# Patient Record
Sex: FEMALE | Race: WHITE | NOT HISPANIC OR LATINO | Employment: FULL TIME | URBAN - METROPOLITAN AREA
[De-identification: names, ages, dates, MRNs, and addresses within clinical notes are randomized per-mention and may not be internally consistent; named-entity substitution may affect disease eponyms.]

---

## 2017-01-06 ENCOUNTER — PATIENT MESSAGE (OUTPATIENT)
Dept: OBSTETRICS AND GYNECOLOGY | Facility: CLINIC | Age: 27
End: 2017-01-06

## 2017-06-19 ENCOUNTER — OFFICE VISIT (OUTPATIENT)
Dept: OBSTETRICS AND GYNECOLOGY | Facility: CLINIC | Age: 27
End: 2017-06-19
Attending: OBSTETRICS & GYNECOLOGY
Payer: COMMERCIAL

## 2017-06-19 VITALS
WEIGHT: 121.94 LBS | SYSTOLIC BLOOD PRESSURE: 100 MMHG | BODY MASS INDEX: 21.61 KG/M2 | DIASTOLIC BLOOD PRESSURE: 72 MMHG | HEIGHT: 63 IN

## 2017-06-19 DIAGNOSIS — Z01.419 WELL FEMALE EXAM WITH ROUTINE GYNECOLOGICAL EXAM: Primary | ICD-10-CM

## 2017-06-19 PROCEDURE — 88175 CYTOPATH C/V AUTO FLUID REDO: CPT

## 2017-06-19 PROCEDURE — 99395 PREV VISIT EST AGE 18-39: CPT | Mod: S$GLB,,, | Performed by: OBSTETRICS & GYNECOLOGY

## 2017-06-19 PROCEDURE — 99999 PR PBB SHADOW E&M-EST. PATIENT-LVL III: CPT | Mod: PBBFAC,,, | Performed by: OBSTETRICS & GYNECOLOGY

## 2017-06-19 NOTE — PROGRESS NOTES
SUBJECTIVE:   26 y.o. female   for routine gyn exam. Patient's last menstrual period was 05/15/2017 (exact date)..  She has no unusual complaints.  Desires refill of OCP's.         Past Medical History:   Diagnosis Date    Anxiety disorder      Past Surgical History:   Procedure Laterality Date    NO PAST SURGERIES       Social History     Social History    Marital status: Single     Spouse name: N/A    Number of children: N/A    Years of education: N/A     Occupational History    Not on file.     Social History Main Topics    Smoking status: Never Smoker    Smokeless tobacco: Never Used    Alcohol use Yes      Comment: socially    Drug use: No    Sexual activity: Yes     Partners: Male     Birth control/ protection: OCP, Condom     Other Topics Concern    Not on file     Social History Narrative    No narrative on file     Family History   Problem Relation Age of Onset    Breast cancer Neg Hx     Cancer Neg Hx     Colon cancer Neg Hx     Diabetes Neg Hx     Eclampsia Neg Hx     Ovarian cancer Neg Hx      labor Neg Hx     Hypertension Neg Hx     Miscarriages / Stillbirths Neg Hx     Stroke Neg Hx      OB History    Para Term  AB Living   0        SAB TAB Ectopic Multiple Live Births                      Current Outpatient Prescriptions   Medication Sig Dispense Refill    cetirizine (ZYRTEC) 10 MG tablet Take 10 mg by mouth once daily.      desogestrel-ethinyl estradiol (CAZIANT, 28,) 0.1/.125/.15-25 mg-mcg tablet Take 1 tablet by mouth once daily. 84 tablet 3    diphenhydrAMINE (BENADRYL) 25 mg capsule Take 25 mg by mouth every 6 (six) hours as needed for Itching.      loratadine (CLARITIN) 10 mg tablet Take 10 mg by mouth once daily.       No current facility-administered medications for this visit.      Allergies: Review of patient's allergies indicates no known allergies.     ROS:  Constitutional: no weight loss, weight gain, fever, fatigue  Eyes:  No vision  "changes, glasses/contacts  ENT/Mouth: No ulcers, sinus problems, ears ringing, headache  Cardiovascular: No inability to lie flat, chest pain, exercise intolerance, swelling, heart palpitations  Respiratory: No wheezing, coughing blood, shortness of breath, or cough  Gastrointestinal: No diarrhea, bloody stool, nausea/vomiting, constipation, gas, hemorrhoids  Genitourinary: No blood in urine, painful urination, urgency of urination, frequency of urination, incomplete emptying, incontinence, abnormal bleeding, painful periods, heavy periods, vaginal discharge, vaginal odor, painful intercourse, sexual problems, bleeding after intercourse.  Musculoskeletal: No muscle weakness  Skin/Breast: No painful breasts, nipple discharge, masses, rash, ulcers  Neurological: No passing out, seizures, numbness, headache  Endocrine: No diabetes, hypothyroid, hyperthyroid, hot flashes, hair loss, abnormal hair growth, ance  Psychiatric: No depression, crying  Hematologic: No bruises, bleeding, swollen lymph nodes, anemia.      OBJECTIVE:   The patient appears well, alert, oriented x 3, in no distress.  /72   Ht 5' 3" (1.6 m)   Wt 55.3 kg (121 lb 14.6 oz)   LMP 05/15/2017 (Exact Date)   BMI 21.60 kg/m²   NECK: no thyromegaly, trachea midline  SKIN: no acne, striae, hirsutism  CHEST: CTAB  CV: RRR  BREAST EXAM: breasts appear normal, no suspicious masses, no skin or nipple changes or axillary nodes  ABDOMEN: no hernias, masses, or hepatosplenomegaly  GENITALIA: normal external genitalia, no erythema, no discharge  URETHRA: normal urethra, normal urethral meatus  VAGINA: Normal  CERVIX: no lesions or cervical motion tenderness  UTERUS: normal size, contour, position, consistency, mobility, non-tender  ADNEXA: no mass, fullness, tenderness      ASSESSMENT:   1. Well female exam with routine gynecological exam  Liquid-based pap smear, screening       PLAN:   Orders Placed This Encounter    Liquid-based pap smear, screening    " desogestrel-ethinyl estradiol (CAZIANT, 28,) 0.1/.125/.15-25 mg-mcg tablet     Return to clinic in 1 year

## 2017-07-08 DIAGNOSIS — Z30.9 ENCOUNTER FOR CONTRACEPTIVE MANAGEMENT: ICD-10-CM

## 2017-07-09 RX ORDER — DESOGESTREL AND ETHINYL ESTRADIOL 7 DAYS X 3
KIT ORAL
Qty: 28 TABLET | Refills: 0 | Status: SHIPPED | OUTPATIENT
Start: 2017-07-09 | End: 2018-03-26

## 2017-07-19 ENCOUNTER — OFFICE VISIT (OUTPATIENT)
Dept: INTERNAL MEDICINE | Facility: CLINIC | Age: 27
End: 2017-07-19
Payer: COMMERCIAL

## 2017-07-19 ENCOUNTER — HOSPITAL ENCOUNTER (OUTPATIENT)
Dept: RADIOLOGY | Facility: OTHER | Age: 27
Discharge: HOME | End: 2017-07-19
Attending: INTERNAL MEDICINE
Payer: COMMERCIAL

## 2017-07-19 VITALS
WEIGHT: 122.56 LBS | OXYGEN SATURATION: 99 % | SYSTOLIC BLOOD PRESSURE: 110 MMHG | HEART RATE: 95 BPM | DIASTOLIC BLOOD PRESSURE: 64 MMHG | HEIGHT: 62 IN | BODY MASS INDEX: 22.55 KG/M2

## 2017-07-19 DIAGNOSIS — G44.311 INTRACTABLE ACUTE POST-TRAUMATIC HEADACHE: Primary | ICD-10-CM

## 2017-07-19 DIAGNOSIS — G44.311 INTRACTABLE ACUTE POST-TRAUMATIC HEADACHE: ICD-10-CM

## 2017-07-19 PROCEDURE — 70450 CT HEAD/BRAIN W/O DYE: CPT | Mod: 26,,, | Performed by: RADIOLOGY

## 2017-07-19 PROCEDURE — 99999 PR PBB SHADOW E&M-EST. PATIENT-LVL III: CPT | Mod: PBBFAC,,, | Performed by: INTERNAL MEDICINE

## 2017-07-19 PROCEDURE — 96372 THER/PROPH/DIAG INJ SC/IM: CPT | Mod: S$GLB,,, | Performed by: INTERNAL MEDICINE

## 2017-07-19 PROCEDURE — 70450 CT HEAD/BRAIN W/O DYE: CPT | Mod: TC

## 2017-07-19 PROCEDURE — 99213 OFFICE O/P EST LOW 20 MIN: CPT | Mod: 25,S$GLB,, | Performed by: INTERNAL MEDICINE

## 2017-07-19 RX ORDER — TRIAMCINOLONE ACETONIDE 40 MG/ML
40 INJECTION, SUSPENSION INTRA-ARTICULAR; INTRAMUSCULAR
Status: COMPLETED | OUTPATIENT
Start: 2017-07-19 | End: 2017-07-19

## 2017-07-19 RX ADMIN — TRIAMCINOLONE ACETONIDE 40 MG: 40 INJECTION, SUSPENSION INTRA-ARTICULAR; INTRAMUSCULAR at 09:07

## 2017-07-19 NOTE — PROGRESS NOTES
Subjective:       Patient ID: Daphne Sullivan is a 26 y.o. female.    Chief Complaint: Headache and Loss of Consciousness    Pt c/o 2 wks of R frontal HA with some extension to occipital area. Tylenol does help temporarily as does caffeine but has lingering HA most of day. Does not wake her up at night and usually starts about 30 min after awakening. She reports tension in neck/trap muscles but feels current HA is different than past. She sees chiropractor for related neck tension twice per week but does not feel any recent manipulation resulted in current HA. No associated n/v/vision changes. Mild sensitivity to light at times. No neuro changes except that she reports fainting at dermatologist's office 3 wks ago when getting botox. This was prior to onset of HA. HA started 6 days after visiting derm. No fever. No meningismus.        Review of Systems   Constitutional: Negative for fever.   Eyes: Negative for visual disturbance.   Respiratory: Negative for shortness of breath.    Cardiovascular: Negative for chest pain.   Gastrointestinal: Negative for nausea and vomiting.   Neurological: Positive for headaches. Negative for weakness and numbness.       Objective:      Physical Exam   Constitutional: She is oriented to person, place, and time. She appears well-developed and well-nourished.   HENT:   Head: Normocephalic and atraumatic.   Right Ear: Tympanic membrane, external ear and ear canal normal.   Left Ear: Tympanic membrane, external ear and ear canal normal.   Nose: No mucosal edema or rhinorrhea.   Mouth/Throat: No oropharyngeal exudate or posterior oropharyngeal erythema.   Eyes: EOM are normal. Pupils are equal, round, and reactive to light.   No photosensitivity on exam   Neck: Neck supple. No thyromegaly present.   Cardiovascular: Normal rate, regular rhythm and normal heart sounds.    Pulmonary/Chest: Effort normal and breath sounds normal.   Musculoskeletal: She exhibits no edema.    Lymphadenopathy:     She has no cervical adenopathy.   Neurological: She is alert and oriented to person, place, and time. She has normal strength. No cranial nerve deficit or sensory deficit. She displays a negative Romberg sign. Coordination and gait normal.   Psychiatric: She has a normal mood and affect. Her behavior is normal.       Assessment:       1. Intractable acute post-traumatic headache        Plan:       1. I suspect this is related to botox but due to syncopal event and unsure if hitting head, will obtain CT head  2. otc analgesics--try to limit if possible  3. Kenalog 40mg IMx1  4. RTC and ED prompts d/w pt and she understood

## 2017-08-04 DIAGNOSIS — Z30.9 ENCOUNTER FOR CONTRACEPTIVE MANAGEMENT: ICD-10-CM

## 2017-08-04 RX ORDER — DESOGESTREL AND ETHINYL ESTRADIOL 7 DAYS X 3
KIT ORAL
Qty: 28 TABLET | Refills: 0 | Status: SHIPPED | OUTPATIENT
Start: 2017-08-04 | End: 2018-03-26

## 2017-08-08 ENCOUNTER — PATIENT MESSAGE (OUTPATIENT)
Dept: INTERNAL MEDICINE | Facility: CLINIC | Age: 27
End: 2017-08-08

## 2017-11-24 ENCOUNTER — TELEPHONE (OUTPATIENT)
Dept: OBSTETRICS AND GYNECOLOGY | Facility: CLINIC | Age: 27
End: 2017-11-24

## 2017-11-24 NOTE — TELEPHONE ENCOUNTER
May try another attempt of Anali IUD.  She has a narrow cervix per note for last IUD attempt.  Consider scheduling insertion at time of period.  Premedicate with NSAIDS.  May also consider cytotec prior to insertion.  Will probably need cervical dilation.

## 2017-11-24 NOTE — TELEPHONE ENCOUNTER
----- Message from Nora Levine sent at 11/24/2017  2:30 PM CST -----  Contact: self  Pt is calling in regards getting the paperwork started to get an IUD.    Pt can be reached at [phone].    Thank you

## 2017-11-27 ENCOUNTER — PATIENT MESSAGE (OUTPATIENT)
Dept: OBSTETRICS AND GYNECOLOGY | Facility: CLINIC | Age: 27
End: 2017-11-27

## 2017-11-28 ENCOUNTER — TELEPHONE (OUTPATIENT)
Dept: OBSTETRICS AND GYNECOLOGY | Facility: CLINIC | Age: 27
End: 2017-11-28

## 2017-11-28 NOTE — TELEPHONE ENCOUNTER
Daphne is scheduled for constantino placement on 12/13/2017- please send cytotec prescription to pharmacy.

## 2017-11-29 RX ORDER — MISOPROSTOL 200 UG/1
200 TABLET ORAL EVERY 6 HOURS
Qty: 4 TABLET | Refills: 0 | Status: SHIPPED | OUTPATIENT
Start: 2017-11-29 | End: 2017-12-12

## 2017-12-05 ENCOUNTER — TELEPHONE (OUTPATIENT)
Dept: OBSTETRICS AND GYNECOLOGY | Facility: CLINIC | Age: 27
End: 2017-12-05

## 2017-12-05 NOTE — TELEPHONE ENCOUNTER
Called Ms Sullivan to discuss benefits for Anali insertion- Patient decline Anali and prefer scheduling for Nexplanon placement. Nexplanon placement is covered at 100% for Buy and Bill with $0.00 co payment. Scheduled for 12/12/2017.

## 2017-12-11 ENCOUNTER — OFFICE VISIT (OUTPATIENT)
Dept: URGENT CARE | Facility: CLINIC | Age: 27
End: 2017-12-11
Payer: COMMERCIAL

## 2017-12-11 VITALS
BODY MASS INDEX: 22.08 KG/M2 | WEIGHT: 120 LBS | TEMPERATURE: 98 F | OXYGEN SATURATION: 100 % | SYSTOLIC BLOOD PRESSURE: 108 MMHG | DIASTOLIC BLOOD PRESSURE: 61 MMHG | HEIGHT: 62 IN | RESPIRATION RATE: 18 BRPM | HEART RATE: 87 BPM

## 2017-12-11 DIAGNOSIS — J02.9 PHARYNGITIS, UNSPECIFIED ETIOLOGY: Primary | ICD-10-CM

## 2017-12-11 LAB
CTP QC/QA: YES
S PYO RRNA THROAT QL PROBE: NEGATIVE

## 2017-12-11 PROCEDURE — 99203 OFFICE O/P NEW LOW 30 MIN: CPT | Mod: 25,S$GLB,, | Performed by: NURSE PRACTITIONER

## 2017-12-11 PROCEDURE — 96372 THER/PROPH/DIAG INJ SC/IM: CPT | Mod: S$GLB,,, | Performed by: EMERGENCY MEDICINE

## 2017-12-11 PROCEDURE — 87880 STREP A ASSAY W/OPTIC: CPT | Mod: QW,S$GLB,, | Performed by: NURSE PRACTITIONER

## 2017-12-11 RX ORDER — BETAMETHASONE SODIUM PHOSPHATE AND BETAMETHASONE ACETATE 3; 3 MG/ML; MG/ML
6 INJECTION, SUSPENSION INTRA-ARTICULAR; INTRALESIONAL; INTRAMUSCULAR; SOFT TISSUE
Status: COMPLETED | OUTPATIENT
Start: 2017-12-11 | End: 2017-12-11

## 2017-12-11 RX ADMIN — BETAMETHASONE SODIUM PHOSPHATE AND BETAMETHASONE ACETATE 6 MG: 3; 3 INJECTION, SUSPENSION INTRA-ARTICULAR; INTRALESIONAL; INTRAMUSCULAR; SOFT TISSUE at 11:12

## 2017-12-11 NOTE — PROGRESS NOTES
"Subjective:       Patient ID: Daphne Sullivan is a 27 y.o. female.    Vitals:  height is 5' 2" (1.575 m) and weight is 54.4 kg (120 lb). Her oral temperature is 98.3 °F (36.8 °C). Her blood pressure is 108/61 and her pulse is 87. Her respiration is 18 and oxygen saturation is 100%.     Chief Complaint: Sore Throat    Patient states both ears feels clogged.      Sore Throat    This is a new problem. The current episode started yesterday. The problem has been gradually worsening. Neither side of throat is experiencing more pain than the other. There has been no fever. The pain is at a severity of 0/10. The patient is experiencing no pain. Associated symptoms include coughing, ear pain, a hoarse voice, a plugged ear sensation, swollen glands and trouble swallowing. Pertinent negatives include no abdominal pain, congestion, ear discharge, headaches or shortness of breath. She has had no exposure to strep or mono. Treatments tried: robtussin. The treatment provided mild relief.     Review of Systems   Constitution: Negative for chills, fever and malaise/fatigue.   HENT: Positive for ear pain, hoarse voice, sore throat and trouble swallowing. Negative for congestion and ear discharge.    Eyes: Negative for discharge and redness.   Cardiovascular: Negative for chest pain, dyspnea on exertion and leg swelling.   Respiratory: Positive for cough. Negative for shortness of breath, sputum production and wheezing.    Musculoskeletal: Negative for myalgias.   Gastrointestinal: Negative for abdominal pain and nausea.   Neurological: Negative for headaches.       Objective:      Physical Exam   Constitutional: She is oriented to person, place, and time. She appears well-developed and well-nourished. She is cooperative.  Non-toxic appearance. She does not appear ill. No distress.   HENT:   Head: Normocephalic and atraumatic.   Right Ear: Tympanic membrane, external ear and ear canal normal. Decreased hearing (CERUMINOSIS) is " noted.   Left Ear: Hearing, tympanic membrane, external ear and ear canal normal.   Nose: Nose normal. No mucosal edema, rhinorrhea or nasal deformity. No epistaxis. Right sinus exhibits no maxillary sinus tenderness and no frontal sinus tenderness. Left sinus exhibits no maxillary sinus tenderness and no frontal sinus tenderness.   Mouth/Throat: Uvula is midline and mucous membranes are normal. No trismus in the jaw. Normal dentition. No uvula swelling. Posterior oropharyngeal erythema (WITH COBBLESTONING) present. Tonsils are 2+ on the right. Tonsils are 2+ on the left. No tonsillar exudate.   Eyes: Conjunctivae and lids are normal. No scleral icterus.   Sclera clear bilat   Neck: Trachea normal, full passive range of motion without pain and phonation normal. Neck supple.   Cardiovascular: Normal rate, regular rhythm, normal heart sounds, intact distal pulses and normal pulses.    Pulmonary/Chest: Effort normal and breath sounds normal. No respiratory distress.   Abdominal: Soft. Normal appearance and bowel sounds are normal. She exhibits no distension. There is no tenderness.   Musculoskeletal: Normal range of motion. She exhibits no edema or deformity.   Lymphadenopathy:        Head (right side): Tonsillar adenopathy present.        Head (left side): Tonsillar adenopathy present.   Neurological: She is alert and oriented to person, place, and time. She exhibits normal muscle tone. Coordination normal.   Skin: Skin is warm, dry and intact. She is not diaphoretic. No pallor.   Psychiatric: She has a normal mood and affect. Her speech is normal and behavior is normal. Judgment and thought content normal. Cognition and memory are normal.   Nursing note and vitals reviewed.      Assessment:       1. Pharyngitis, unspecified etiology        Plan:         Pharyngitis, unspecified etiology  -     POCT rapid strep A  -     CULTURE, STREP A,  THROAT    Other orders  -     betamethasone acetate-betamethasone sodium  phosphate injection 6 mg; Inject 1 mL (6 mg total) into the muscle one time.      Patient Instructions   WE WILL CALL YOU IN 2-3 DAYS WITH THE RESULTS OF YOUR STREP CULTURE  Pharyngitis (Sore Throat), Report Pending    Pharyngitis (sore throat) is often due to a virus. It can also be caused by the streptococcus, or strep, bacterium, often called strep throat. Both viral and strep infections can cause throat pain that is worse when swallowing, aching all over with headache, and fever. Both types of infections are contagious. They may be spread by coughing, kissing, or touching others after touching your mouth or nose.  A test has been done to find out whether you (or your child, if your child is the patient) have strep throat. Call this facility or your healthcare provider if you were not given your test results. If the test is positive for strep infection, you will need to take antibiotic medicines. A prescription can be called into your pharmacy at that time. If the test is negative, you probably have a viral pharyngitis. This does not need to be treated with antibiotics. Until you receive the results of the strep test, you should stay home from work. If your child is being tested, he or she should stay home from school.  Home care  · Rest at home. Drink plenty of fluids so you won't get dehydrated.  · If the test is positive for strep, don't go to work or school for the first 2 days of taking the antibiotics. After this time, you will not be contagious. You can then return to work or school if you are feeling better.   · Take the antibiotic medicine for the full 10 days, even if you feel better. This is very important to make sure the infection is treated. It is also important to prevent drug-resistant germs from developing. If you were given an antibiotic shot, you won't need more antibiotics.  · For children: Use acetaminophen for fever, fussiness, or discomfort. In infants older than 6 months of age, you may  use ibuprofen instead of acetaminophen. Talk with your child's healthcare provider before giving these medicines if your child has chronic liver or kidney disease or ever had a stomach ulcer or GI bleeding. Never give aspirin to a child under 18 years of age who is ill with a fever. It may cause severe liver damage.  · For adults: Use acetaminophen or ibuprofen to control pain or fever, unless another medicine was prescribed for this. Talk with your healthcare provider before taking these medicines if you have chronic liver or kidney disease or ever had a stomach ulcer or GI bleeding.  · Use throat lozenges or numbing throat sprays to help reduce pain. Gargling with warm salt water will also help reduce throat pain. For this, dissolve 1/2 teaspoon of salt in 1 glass of warm water. To help soothe a sore throat, children can sip on juice or a popsicle. Children 5 years and older can also suck on a lollipop or hard candy.  · Don't eat salty or spicy foods. These can irritate the throat.  Follow-up care  Follow up with your healthcare provider or our staff if you don't get better over the next week.  When to seek medical advice  Call your healthcare provider right away if any of these occur:  · Fever as directed by your healthcare provider. For children, seek care if:  ¨ Your child is of any age and has repeated fevers above 104°F (40°C).  ¨ Your child is younger than 2 years of age and has a fever of 100.4°F (38°C) that continues for more than 1 day.  ¨ Your child is 2 years old or older and has a fever of 100.4°F (38°C) that continues for more than 3 days.  · New or worsening ear pain, sinus pain, or headache  · Painful lumps in the back of neck  · Stiff neck  · Lymph nodes are getting larger  · Inability to swallow liquids, excessive drooling, or inability to open mouth wide due to throat pain  · Signs of dehydration (very dark urine or no urine, sunken eyes, dizziness)  · Trouble breathing or noisy  breathing  · Muffled voice  · New rash  · Child appears to be getting sicker  Date Last Reviewed: 4/13/2015  © 2010-6286 [employer], [email or web address]. [address] [place] [address], [place] [address]. All rights reserved. This information is not intended as a substitute for professional medical care. Always follow your healthcare professional's instructions.

## 2017-12-11 NOTE — PATIENT INSTRUCTIONS
WE WILL CALL YOU IN 2-3 DAYS WITH THE RESULTS OF YOUR STREP CULTURE  Pharyngitis (Sore Throat), Report Pending    Pharyngitis (sore throat) is often due to a virus. It can also be caused by the streptococcus, or strep, bacterium, often called strep throat. Both viral and strep infections can cause throat pain that is worse when swallowing, aching all over with headache, and fever. Both types of infections are contagious. They may be spread by coughing, kissing, or touching others after touching your mouth or nose.  A test has been done to find out whether you (or your child, if your child is the patient) have strep throat. Call this facility or your healthcare provider if you were not given your test results. If the test is positive for strep infection, you will need to take antibiotic medicines. A prescription can be called into your pharmacy at that time. If the test is negative, you probably have a viral pharyngitis. This does not need to be treated with antibiotics. Until you receive the results of the strep test, you should stay home from work. If your child is being tested, he or she should stay home from school.  Home care  · Rest at home. Drink plenty of fluids so you won't get dehydrated.  · If the test is positive for strep, don't go to work or school for the first 2 days of taking the antibiotics. After this time, you will not be contagious. You can then return to work or school if you are feeling better.   · Take the antibiotic medicine for the full 10 days, even if you feel better. This is very important to make sure the infection is treated. It is also important to prevent drug-resistant germs from developing. If you were given an antibiotic shot, you won't need more antibiotics.  · For children: Use acetaminophen for fever, fussiness, or discomfort. In infants older than 6 months of age, you may use ibuprofen instead of acetaminophen. Talk with your child's healthcare provider before giving these  medicines if your child has chronic liver or kidney disease or ever had a stomach ulcer or GI bleeding. Never give aspirin to a child under 18 years of age who is ill with a fever. It may cause severe liver damage.  · For adults: Use acetaminophen or ibuprofen to control pain or fever, unless another medicine was prescribed for this. Talk with your healthcare provider before taking these medicines if you have chronic liver or kidney disease or ever had a stomach ulcer or GI bleeding.  · Use throat lozenges or numbing throat sprays to help reduce pain. Gargling with warm salt water will also help reduce throat pain. For this, dissolve 1/2 teaspoon of salt in 1 glass of warm water. To help soothe a sore throat, children can sip on juice or a popsicle. Children 5 years and older can also suck on a lollipop or hard candy.  · Don't eat salty or spicy foods. These can irritate the throat.  Follow-up care  Follow up with your healthcare provider or our staff if you don't get better over the next week.  When to seek medical advice  Call your healthcare provider right away if any of these occur:  · Fever as directed by your healthcare provider. For children, seek care if:  ¨ Your child is of any age and has repeated fevers above 104°F (40°C).  ¨ Your child is younger than 2 years of age and has a fever of 100.4°F (38°C) that continues for more than 1 day.  ¨ Your child is 2 years old or older and has a fever of 100.4°F (38°C) that continues for more than 3 days.  · New or worsening ear pain, sinus pain, or headache  · Painful lumps in the back of neck  · Stiff neck  · Lymph nodes are getting larger  · Inability to swallow liquids, excessive drooling, or inability to open mouth wide due to throat pain  · Signs of dehydration (very dark urine or no urine, sunken eyes, dizziness)  · Trouble breathing or noisy breathing  · Muffled voice  · New rash  · Child appears to be getting sicker  Date Last Reviewed: 4/13/2015  © 4581-4468  [employer]. [address] [place] [address], [place] [address]. All rights reserved. This information is not intended as a substitute for professional medical care. Always follow your healthcare professional's instructions.

## 2017-12-12 ENCOUNTER — PROCEDURE VISIT (OUTPATIENT)
Dept: OBSTETRICS AND GYNECOLOGY | Facility: CLINIC | Age: 27
End: 2017-12-12
Payer: COMMERCIAL

## 2017-12-12 DIAGNOSIS — Z30.017 NEXPLANON INSERTION: Primary | ICD-10-CM

## 2017-12-12 DIAGNOSIS — Z97.5 NEXPLANON IN PLACE: ICD-10-CM

## 2017-12-12 PROCEDURE — 11981 INSERTION DRUG DLVR IMPLANT: CPT | Mod: S$GLB,,, | Performed by: NURSE PRACTITIONER

## 2017-12-12 NOTE — PROCEDURES
Procedures   CC: NEXPLANON INSERTION      PRE-NEXPLANON INSERTION COUNSELING:  All contraceptive options were reviewed and the patient chooses Nexplanon.  Patients history was reviewed and there were no contraindications to Nexplanon.  The procedure and minimal risks of pain, bleeding, bruising and infection at the insertion site discussed. Possible irregular menstrual bleeding pattern versus amenorrhea was explained.  No protection against STDs discussed.  Written information provided; all questions answered and patient agrees to proceed.  Consent signed and scanned into computer.  NEXPLANON LOT #[id number],  EXPIRATION 05/2020    There were no vitals filed for this visit.    EXAM:  With patient in supine position the nondominant arm was flexed at the elbow and externally rotated.  The insertion site was identified 6-8 cm above the elbow crease at the inner side of the upper arm overlying the groove between biceps and triceps.  The insertion site was marked and a second jorge was placed 6-8 cm above the first.    PROCEDURE:  TIME OUT PERFORMED.  The insertion site was prepped with antiseptic and injected with 2 cc of 1% Xylocaine without epinephrine subq along the planned insertion canal.  Using sterile technique the Nexplanon applicator was visually verified and removed from the blister pack.  The Transparent Protection Cap was removed by sliding it horizontally in the direction of the arrow away from the needle.  The white colored implant was visualized by looking into the tip of the needle.   The needle tip was inserted bevel side up at a 30 degree angle to penetrate the skin.  The applicator was lowered parallel to the arm and the skin was tented with the needle.  While lifting skin with the needle, the needle was inserted to its full length.  Keeping the applicator in place, the purple slider was unlocked by pushing it slightly down.  The slider was then moved fully back until it stopped.  The applicator was then  removed. The Needle was noted to be fully retracted and only the purple tip of the obturator was visible.  The implant was palpable beneath the skin after insertion.  A small adhesive bandage and then a pressure bandage was placed over the insertion site.  The patient tolerated the procedure well.    ASSESSMENT:  1. Contraception management / Nexplanon insertion.V25.0.    POST NEXPLANON INSERTION COUNSELING:  Manage post nexplanon placement arm pain with NSAIDs, Tylenol or Rx per Med[employer].  Keep arm elevated and apply intermittent ice packs to decrease pain and bruising for 24 Hours.  May remove bandage in 24 hours.  Nexplanon danger signs (worsening pain at insertion site, bleeding through bandage, redness and/or pus drainage at insertion site).  Removal in 3 years.    Counseling lasted approximately 15 minutes and all her questions were answered.    FOLLOW-UP: with me in two weeks.

## 2017-12-14 LAB — S PYO THROAT QL CULT: NEGATIVE

## 2017-12-15 ENCOUNTER — TELEPHONE (OUTPATIENT)
Dept: URGENT CARE | Facility: CLINIC | Age: 27
End: 2017-12-15

## 2017-12-15 NOTE — TELEPHONE ENCOUNTER
----- Message from Connor Mistry MD sent at 12/14/2017  7:36 PM CST -----  These results are normal. Please notify the patient.

## 2018-03-20 ENCOUNTER — PATIENT MESSAGE (OUTPATIENT)
Dept: OBSTETRICS AND GYNECOLOGY | Facility: CLINIC | Age: 28
End: 2018-03-20

## 2018-03-20 ENCOUNTER — PATIENT MESSAGE (OUTPATIENT)
Dept: INTERNAL MEDICINE | Facility: CLINIC | Age: 28
End: 2018-03-20

## 2018-03-20 DIAGNOSIS — F40.01 PANIC DISORDER WITH AGORAPHOBIA: ICD-10-CM

## 2018-03-20 RX ORDER — TRETINOIN 0.25 MG/G
CREAM TOPICAL
COMMUNITY
Start: 2017-12-13 | End: 2019-07-31

## 2018-03-22 RX ORDER — LORAZEPAM 0.5 MG/1
0.5 TABLET ORAL EVERY 6 HOURS PRN
Qty: 10 TABLET | Refills: 0 | OUTPATIENT
Start: 2018-03-22 | End: 2018-04-21

## 2018-03-26 ENCOUNTER — OFFICE VISIT (OUTPATIENT)
Dept: INTERNAL MEDICINE | Facility: CLINIC | Age: 28
End: 2018-03-26
Payer: COMMERCIAL

## 2018-03-26 VITALS
SYSTOLIC BLOOD PRESSURE: 110 MMHG | WEIGHT: 118.63 LBS | BODY MASS INDEX: 21.83 KG/M2 | HEART RATE: 62 BPM | HEIGHT: 62 IN | DIASTOLIC BLOOD PRESSURE: 64 MMHG

## 2018-03-26 DIAGNOSIS — F41.8 OTHER SPECIFIED ANXIETY DISORDERS: Primary | ICD-10-CM

## 2018-03-26 PROCEDURE — 99999 PR PBB SHADOW E&M-EST. PATIENT-LVL III: CPT | Mod: PBBFAC,,, | Performed by: INTERNAL MEDICINE

## 2018-03-26 PROCEDURE — 99213 OFFICE O/P EST LOW 20 MIN: CPT | Mod: S$GLB,,, | Performed by: INTERNAL MEDICINE

## 2018-03-26 RX ORDER — LORAZEPAM 0.5 MG/1
0.5 TABLET ORAL 2 TIMES DAILY PRN
Qty: 20 TABLET | Refills: 0 | Status: SHIPPED | OUTPATIENT
Start: 2018-03-26 | End: 2019-07-31

## 2018-03-26 NOTE — PROGRESS NOTES
Subjective:       Patient ID: Daphne Sullivan is a 27 y.o. female.    Chief Complaint: Anxiety    Pt reports increased anxiety since father  unexpectedly 2 wks ago. She has used some ativan very rarely in past for anxiety but no longer has any and would like refill. Denies SI/HI. Seems to be grieving appropriately otherwise and is engaging with a counselor.       Review of Systems   Constitutional: Negative for activity change and unexpected weight change.   HENT: Negative for hearing loss, rhinorrhea and trouble swallowing.    Eyes: Negative for discharge and visual disturbance.   Respiratory: Positive for chest tightness. Negative for shortness of breath and wheezing.    Cardiovascular: Positive for palpitations. Negative for chest pain.   Gastrointestinal: Negative for blood in stool, constipation, diarrhea and vomiting.   Endocrine: Negative for polydipsia and polyuria.   Genitourinary: Negative for difficulty urinating, dysuria, hematuria and menstrual problem.   Musculoskeletal: Negative for arthralgias, joint swelling and neck pain.   Neurological: Negative for weakness and headaches.   Psychiatric/Behavioral: Negative for confusion and dysphoric mood. The patient is nervous/anxious.        Objective:      Physical Exam   Constitutional: She is oriented to person, place, and time. She appears well-developed and well-nourished.   Neck: Neck supple. No thyromegaly present.   Cardiovascular: Normal rate, regular rhythm and normal heart sounds.    Pulmonary/Chest: Effort normal and breath sounds normal.   Musculoskeletal: She exhibits no edema.   Lymphadenopathy:     She has no cervical adenopathy.   Neurological: She is alert and oriented to person, place, and time.   Psychiatric: She has a normal mood and affect. Her behavior is normal.       Assessment:       1. Other specified anxiety disorders        Plan:       1. Refill limited supply of ativan--proper use d/w pt  2. She understands to let me or  pcp know if needing ativan more frequently and we can consider more appropriate long term maint meds

## 2018-07-11 ENCOUNTER — PATIENT MESSAGE (OUTPATIENT)
Dept: OBSTETRICS AND GYNECOLOGY | Facility: CLINIC | Age: 28
End: 2018-07-11

## 2018-07-25 ENCOUNTER — OFFICE VISIT (OUTPATIENT)
Dept: OBSTETRICS AND GYNECOLOGY | Facility: CLINIC | Age: 28
End: 2018-07-25
Attending: OBSTETRICS & GYNECOLOGY
Payer: COMMERCIAL

## 2018-07-25 ENCOUNTER — PATIENT MESSAGE (OUTPATIENT)
Dept: OBSTETRICS AND GYNECOLOGY | Facility: CLINIC | Age: 28
End: 2018-07-25

## 2018-07-25 VITALS
DIASTOLIC BLOOD PRESSURE: 60 MMHG | WEIGHT: 119.69 LBS | BODY MASS INDEX: 22.03 KG/M2 | SYSTOLIC BLOOD PRESSURE: 100 MMHG | HEIGHT: 62 IN

## 2018-07-25 DIAGNOSIS — Z01.419 WELL FEMALE EXAM WITH ROUTINE GYNECOLOGICAL EXAM: Primary | ICD-10-CM

## 2018-07-25 PROCEDURE — 99999 PR PBB SHADOW E&M-EST. PATIENT-LVL III: CPT | Mod: PBBFAC,,, | Performed by: OBSTETRICS & GYNECOLOGY

## 2018-07-25 PROCEDURE — 99395 PREV VISIT EST AGE 18-39: CPT | Mod: S$GLB,,, | Performed by: OBSTETRICS & GYNECOLOGY

## 2018-07-25 NOTE — PROGRESS NOTES
SUBJECTIVE:   27 y.o. female   for routine gyn exam. No LMP recorded. Patient has had an implant..  She has no unusual complaints.  Has Nexplanon since .  C/o hair loss.         Past Medical History:   Diagnosis Date    Anxiety disorder      Past Surgical History:   Procedure Laterality Date    NO PAST SURGERIES       Social History     Social History    Marital status: Single     Spouse name: N/A    Number of children: N/A    Years of education: N/A     Occupational History    Not on file.     Social History Main Topics    Smoking status: Never Smoker    Smokeless tobacco: Never Used    Alcohol use Yes      Comment: socially    Drug use: No    Sexual activity: Yes     Partners: Male     Birth control/ protection: Condom, Implant      Comment: Nexplanon 17 - Adriana Ewing     Other Topics Concern    Not on file     Social History Narrative    No narrative on file     Family History   Problem Relation Age of Onset    Heart disease Father         CAD--MI    Breast cancer Neg Hx     Cancer Neg Hx     Colon cancer Neg Hx     Diabetes Neg Hx     Eclampsia Neg Hx     Ovarian cancer Neg Hx      labor Neg Hx     Hypertension Neg Hx     Miscarriages / Stillbirths Neg Hx     Stroke Neg Hx      OB History    Para Term  AB Living   0             SAB TAB Ectopic Multiple Live Births                           Current Outpatient Prescriptions   Medication Sig Dispense Refill    LORazepam (ATIVAN) 0.5 MG tablet Take 1 tablet (0.5 mg total) by mouth 2 (two) times daily as needed for Anxiety. 20 tablet 0    tretinoin (RETIN-A) 0.025 % cream        No current facility-administered medications for this visit.      Allergies: Patient has no known allergies.     ROS:  Constitutional: no weight loss, weight gain, fever, fatigue  Eyes:  No vision changes, glasses/contacts  ENT/Mouth: No ulcers, sinus problems, ears ringing, headache  Cardiovascular: No inability to lie  "flat, chest pain, exercise intolerance, swelling, heart palpitations  Respiratory: No wheezing, coughing blood, shortness of breath, or cough  Gastrointestinal: No diarrhea, bloody stool, nausea/vomiting, constipation, gas, hemorrhoids  Genitourinary: No blood in urine, painful urination, urgency of urination, frequency of urination, incomplete emptying, incontinence, abnormal bleeding, painful periods, heavy periods, vaginal discharge, vaginal odor, painful intercourse, sexual problems, bleeding after intercourse.  Musculoskeletal: No muscle weakness  Skin/Breast: No painful breasts, nipple discharge, masses, rash, ulcers  Neurological: No passing out, seizures, numbness, headache  Endocrine: No diabetes, hypothyroid, hyperthyroid, hot flashes, hair loss, abnormal hair growth, ance  Psychiatric: No depression, crying  Hematologic: No bruises, bleeding, swollen lymph nodes, anemia.      OBJECTIVE:   The patient appears well, alert, oriented x 3, in no distress.  /60   Ht 5' 2" (1.575 m)   Wt 54.3 kg (119 lb 11.4 oz)   BMI 21.90 kg/m²   NECK: no thyromegaly, trachea midline  SKIN: no acne, striae, hirsutism  CHEST: CTAB  CV: RRR  BREAST EXAM: breasts appear normal, no suspicious masses, no skin or nipple changes or axillary nodes  ABDOMEN: no hernias, masses, or hepatosplenomegaly  GENITALIA: normal external genitalia, no erythema, no discharge  URETHRA: normal urethra, normal urethral meatus  VAGINA: Normal  CERVIX: no lesions or cervical motion tenderness  UTERUS: normal size, contour, position, consistency, mobility, non-tender  ADNEXA: no mass, fullness, tenderness      ASSESSMENT:   1. Well female exam with routine gynecological exam         PLAN:       Discussed Nexplanon and SE.  Consider removal and alternative contraception  Return to clinic in 1 year  "

## 2018-08-06 ENCOUNTER — PATIENT MESSAGE (OUTPATIENT)
Dept: OBSTETRICS AND GYNECOLOGY | Facility: CLINIC | Age: 28
End: 2018-08-06

## 2018-09-07 ENCOUNTER — PROCEDURE VISIT (OUTPATIENT)
Dept: OBSTETRICS AND GYNECOLOGY | Facility: CLINIC | Age: 28
End: 2018-09-07
Payer: COMMERCIAL

## 2018-09-07 VITALS
BODY MASS INDEX: 21.73 KG/M2 | SYSTOLIC BLOOD PRESSURE: 112 MMHG | WEIGHT: 118.81 LBS | DIASTOLIC BLOOD PRESSURE: 64 MMHG

## 2018-09-07 DIAGNOSIS — Z30.46 NEXPLANON REMOVAL: Primary | ICD-10-CM

## 2018-09-07 DIAGNOSIS — Z30.8 ENCOUNTER FOR OTHER CONTRACEPTIVE MANAGEMENT: ICD-10-CM

## 2018-09-07 PROCEDURE — 11982 REMOVE DRUG IMPLANT DEVICE: CPT | Mod: S$GLB,,, | Performed by: OBSTETRICS & GYNECOLOGY

## 2018-09-07 RX ORDER — NORETHINDRONE ACETATE AND ETHINYL ESTRADIOL 1MG-20(21)
1 KIT ORAL DAILY
Qty: 30 TABLET | Refills: 11 | Status: SHIPPED | OUTPATIENT
Start: 2018-09-07 | End: 2019-07-31 | Stop reason: SDUPTHER

## 2018-09-07 NOTE — PROCEDURES
Procedures   NEXPLANON REMOVAL:    Pt is a 28 y.o. with LMP No LMP recorded. Patient has had an implant. here for implanon removal because she feels that it is making her lose her hair..      PRE-IMPLANON REMOVAL COUNSELING:  The patient was advised of minimal risks of bleeding and pain and she agrees to proceed.    EXAM:  Implanon palpable by palpation or imaging.  The end closest to the elbow was marked.    PROCEDURE:  TIME OUT PERFORMED.  The patient was placed in same position as for insertion.  The area was prepped with antiseptic.  4 cc of 1% lidocaine was injected just underneath end of implant closest to the elbow.  Downward pressure was placed on the end of the implant closest to the axilla.  Using sterile technique, a 2-3 mm incision was made in the area of the previous insertion scar on her arm at tip of the implant closest to the elbow.    The entire 4 cm implant was removed.  The incision site was closed with steri strips and a small adhesive bandage and then pressure bandage was placed over insertion site.  The procedure was well tolerated     ASSESSMENT:  Contraceptive Management / Removal Implanon    POST IMPLANON REMOVAL COUNSELING:  Expect period-like flow to occur after Implanon removal and periods to return to pre-Implanon  pattern.  Manage post Implanon arm pain with Tylenol or Rx per [facility].  Keep arm elevated and apply intermittent ice packs to decrease pain and bruising for 24 hours.  May remove bandage in 24 hours.  Implanon removal danger signs (worsening pain at incision site, bleeding through  bandage, redness and/or pus drainage at incision site).    POST IMPLANON REMOVAL CONTRACEPTION: All forms of contraception discussed in length with patient.  Discussed the pros and cons of OCPs, depo provera, IUD, nexplanon, the patch and NuvaRing.  Discussed that pt might have irregular menses with the initiation of any form of contraception.  Discussed that none of these protect against STDs.   Depending on which contraception pt chooses, discussed need for backup form of birth control for at least the first month after switching.  Pt voiced understanding and all questions were answered.  After our discussion, the patient has decided to try OCPs.    -Discussed that menses will be irregular for first 3 months      Counseling lasted approximately 15 minutes and all her questions were answered.    FOLLOW-UP: with me for annual gyn exam or prn.    Zena Montenegro MD  Obstetrics & Gynecology

## 2019-02-21 ENCOUNTER — OFFICE VISIT (OUTPATIENT)
Dept: INTERNAL MEDICINE | Facility: CLINIC | Age: 29
End: 2019-02-21
Payer: COMMERCIAL

## 2019-02-21 VITALS
SYSTOLIC BLOOD PRESSURE: 110 MMHG | BODY MASS INDEX: 21.94 KG/M2 | DIASTOLIC BLOOD PRESSURE: 62 MMHG | WEIGHT: 119.25 LBS | OXYGEN SATURATION: 99 % | HEIGHT: 62 IN | HEART RATE: 59 BPM

## 2019-02-21 DIAGNOSIS — L29.9 ITCHING: ICD-10-CM

## 2019-02-21 DIAGNOSIS — R21 RASH: ICD-10-CM

## 2019-02-21 DIAGNOSIS — B02.9 HERPES ZOSTER WITHOUT COMPLICATION: Primary | ICD-10-CM

## 2019-02-21 PROCEDURE — 99999 PR PBB SHADOW E&M-EST. PATIENT-LVL III: CPT | Mod: PBBFAC,,, | Performed by: NURSE PRACTITIONER

## 2019-02-21 PROCEDURE — 3008F BODY MASS INDEX DOCD: CPT | Mod: CPTII,S$GLB,, | Performed by: NURSE PRACTITIONER

## 2019-02-21 PROCEDURE — 99213 OFFICE O/P EST LOW 20 MIN: CPT | Mod: S$GLB,,, | Performed by: NURSE PRACTITIONER

## 2019-02-21 PROCEDURE — 99999 PR PBB SHADOW E&M-EST. PATIENT-LVL III: ICD-10-PCS | Mod: PBBFAC,,, | Performed by: NURSE PRACTITIONER

## 2019-02-21 PROCEDURE — 3008F PR BODY MASS INDEX (BMI) DOCUMENTED: ICD-10-PCS | Mod: CPTII,S$GLB,, | Performed by: NURSE PRACTITIONER

## 2019-02-21 PROCEDURE — 99213 PR OFFICE/OUTPT VISIT, EST, LEVL III, 20-29 MIN: ICD-10-PCS | Mod: S$GLB,,, | Performed by: NURSE PRACTITIONER

## 2019-02-21 RX ORDER — MUPIROCIN 20 MG/G
OINTMENT TOPICAL 2 TIMES DAILY
Qty: 22 G | Refills: 0 | Status: SHIPPED | OUTPATIENT
Start: 2019-02-21 | End: 2019-02-28

## 2019-02-21 RX ORDER — VALACYCLOVIR HYDROCHLORIDE 1 G/1
1000 TABLET, FILM COATED ORAL 3 TIMES DAILY
Qty: 21 TABLET | Refills: 0 | Status: SHIPPED | OUTPATIENT
Start: 2019-02-21 | End: 2019-07-31

## 2019-02-21 NOTE — PATIENT INSTRUCTIONS
Valtrex 1g po TID x 7 days.  Bactroban ointment to rash BID x 7 days.  OTC Benadryl prn.  OTC Tylenol or NSAID's prn.  Report to ED for severe or worsening symptoms.  F/U with PCP.  F/U with Dermatology.  RTC prn.      Shingles (Herpes Zoster)     Talk to your healthcare provider about the shingles vaccine.     Shingles is also called herpes zoster. It is a painful skin rash caused by the herpes zoster virus. This is the same virus that causes chickenpox. After a person has chickenpox, the virus remains inactive in the nerve cells. Years later, the virus can become active again and travel to the skin. Most people have shingles only once, but it is possible to have it more than once.  What are the risk factors for shingles?  Anyone who has ever had chickenpox can develop shingles. But your risk is greater if you:  · Are 50 years of age or older  · Have an illness that weakens your immune system, such as HIV/AIDS  · Have cancer, especially Hodgkin disease or lymphoma  · Take medicines that weaken your immune system  What are the symptoms of shingles?  · The first sign of shingles is usually pain, burning, tingling, or itching on one part of your face or body. You may also feel as if you have the flu, with fever and chills.  · A red rash with small blisters appears within a few days. The rash may appear as follows:   ¨ The blisters can occur anywhere, but theyre most common on the back, chest, or abdomen.  ¨ They usually appear on only one side of the body, spreading along the nerve pathway where the virus was inactive.   ¨ The rash can also form around an eye, along one side of the face or neck, or in the mouth.  ¨ In a few people, usually those with weakened immune systems, shingles appear on more than one part of the body at once.  · After a few days, the blisters become dry and form a crust. The crust falls off in days to weeks. The blisters generally do not leave scars.  How is shingles treated?  For most people,  shingles heals on its own in a few weeks. But treatment is recommended to help relieve pain, speed healing, and reduce the risk of complications. Antiviral medicines are prescribed within the first 72 hours of the appearance of the rash. To lessen symptoms:  · Apply ice packs (wrapped in a thin towel) or cool compresses, or soak in a cool bath.  · Use calamine lotion to calm itchy skin.  · Ask your healthcare provider about over-the-counter pain relievers. If your pain is severe, your healthcare provider may prescribe stronger pain medicines.  What are the complications of shingles?  Shingles often goes away with no lasting effects. But some people have serious problems long after the blisters have healed:  · Postherpetic neuralgia. This is the most common complication. It is severe nerve pain at the place where the rash used to be. It can last for months, or even years after you have had shingles. Medicines can be prescribed to help relieve the pain and improve quality of life.  · Bacterial infection. Shingles blisters may become infected with bacteria. Antibiotic medicine is used to treat the infection.  · Eye problems. A person with shingles on the face should see his or her healthcare provider right away. Shingles can cause serious problems with vision, and even blindness.  Very rarely shingles can also lead to pneumonia, hearing problems, brain inflammation, or even death.   When to seek medical care  Contact your healthcare provider if you experience any of the following:  · Symptoms that dont go away with treatment  · A rash or blisters near your eye  · Increased drainage, fever, or rash after treatment, or severe pain that doesnt go away   How can shingles be prevented?  You can only get shingles if you have had chicken pox in the past. Those who have never had chickenpox can get the virus from you. Although instead of developing shingles, the person may get chickenpox. Until your blisters form scabs,  avoid contact with others, especially the following:  · Pregnant women who have never had chickenpox or the vaccine  · Infants who were born early (prematurely) or who had low weight at birth  · People with weak immune system (for example, people receiving chemotherapy for cancer, people who have had organ transplants, or people with HIV infections)     The shingles vaccine  If youre 60 years of age or older, ask your healthcare provider if you should receive the shingles vaccine. The vaccine makes it less likely that you will develop shingles. If you do develop shingles, your symptoms will likely be milder than if you hadnt been vaccinated. Note: Although the vaccine is licensed for people 50 years of age or older, the CDC does not recommend the vaccine for those who are 50 to 59 years old.   Date Last Reviewed: 10/1/2016  © 2839-6741 [employer]. [address] [place] [address]. All rights reserved. This information is not intended as a substitute for professional medical care. Always follow your healthcare professional's instructions.

## 2019-02-21 NOTE — PROGRESS NOTES
"Subjective:       Patient ID: Daphne Sullivan is a 28 y.o. female.    Chief Complaint: Pain (left side) and Rash      Daphne Sullivan is a 29 y/o CF with a 2 day h/o gradually worsening intermittent sharp left side pain and an itchy rash. Pain 4/10 and aching. She reports that the pain originated at the left mid-back area and radiated to the LUQ area by the rash. She also c/o an itchy rash. Pain is worse in the am and is aggravated by lying down and taking deep breaths. Treatments tried include Cortizone cream and water with mild relief of symptoms.             Rash   This is a new problem. The current episode started in the past 7 days (2 days ago). The problem has been gradually worsening since onset. Location: LUQ. The rash is characterized by itchiness and blistering. She was exposed to nothing. Pertinent negatives include no anorexia, congestion, cough, diarrhea, eye pain, facial edema, fatigue, fever, joint pain, nail changes, rhinorrhea, shortness of breath, sore throat or vomiting. (Sharp, aching l"eft side pain.") Past treatments include anti-itch cream. The treatment provided mild relief. Her past medical history is significant for varicella. There is no history of allergies, asthma or eczema.          Past Medical History: Patient has a past medical history of Anxiety disorder.    Past Surgical History: Patient has a past surgical history that includes No past surgeries.    Social History: Patient reports that  has never smoked. she has never used smokeless tobacco. She reports that she drinks alcohol. She reports that she does not use drugs.    Family History: family history includes Heart disease in her father.    Medications:   Current Outpatient Medications   Medication Sig    norethindrone-ethinyl estradiol (JUNEL FE 1/20) 1 mg-20 mcg (21)/75 mg (7) per tablet Take 1 tablet by mouth once daily.    tretinoin (RETIN-A) 0.025 % cream     LORazepam (ATIVAN) 0.5 MG tablet Take 1 tablet " "(0.5 mg total) by mouth 2 (two) times daily as needed for Anxiety.    mupirocin (BACTROBAN) 2 % ointment Apply topically 2 (two) times daily. for 7 days    valACYclovir (VALTREX) 1000 MG tablet Take 1 tablet (1,000 mg total) by mouth 3 (three) times daily. for 7 days     No current facility-administered medications for this visit.        Allergies: Patient has No Known Allergies.    Review of Systems   Constitutional: Negative for activity change, appetite change, fatigue, fever and unexpected weight change.   HENT: Negative for congestion, dental problem, ear discharge, ear pain, facial swelling, hearing loss, nosebleeds, postnasal drip, rhinorrhea, sinus pressure, sneezing, sore throat, tinnitus, trouble swallowing and voice change.    Eyes: Negative for pain and visual disturbance.   Respiratory: Negative for cough, chest tightness, shortness of breath, wheezing and stridor.    Cardiovascular: Negative for chest pain.   Gastrointestinal: Negative for abdominal distention, abdominal pain, anal bleeding, anorexia, blood in stool, constipation, diarrhea, nausea, rectal pain and vomiting.        LUQ pain     Musculoskeletal: Negative for gait problem, joint pain and neck pain.   Skin: Positive for rash (itchy). Negative for color change and nail changes.   Allergic/Immunologic: Negative for environmental allergies.   Neurological: Negative for dizziness, seizures, syncope, facial asymmetry, speech difficulty, weakness, light-headedness, numbness and headaches.   Psychiatric/Behavioral: Negative for agitation and confusion. The patient is not nervous/anxious.        Objective:       /62 (BP Location: Right arm, Patient Position: Sitting)   Pulse (!) 59   Ht 5' 2" (1.575 m)   Wt 54.1 kg (119 lb 4.3 oz)   SpO2 99%   BMI 21.81 kg/m²     Physical Exam   Constitutional: She is oriented to person, place, and time. She appears well-developed and well-nourished.   HENT:   Head: Normocephalic and atraumatic. Not " macrocephalic and not microcephalic. Head is without raccoon's eyes, without Villagomez's sign, without abrasion, without contusion, without laceration, without right periorbital erythema and without left periorbital erythema. Hair is normal.   Right Ear: No lacerations. No drainage, swelling or tenderness. No foreign bodies. No mastoid tenderness. Tympanic membrane is not injected, not scarred, not perforated, not erythematous, not retracted and not bulging. Tympanic membrane mobility is normal. No middle ear effusion. No hemotympanum. No decreased hearing is noted.   Left Ear: No lacerations. No drainage, swelling or tenderness. No foreign bodies. No mastoid tenderness. Tympanic membrane is not injected, not scarred, not perforated, not erythematous, not retracted and not bulging. Tympanic membrane mobility is normal.  No middle ear effusion. No hemotympanum. No decreased hearing is noted.   Nose: Nose normal. No mucosal edema, rhinorrhea, nose lacerations, sinus tenderness or nasal deformity.   Mouth/Throat: Uvula is midline.   Eyes: Conjunctivae and lids are normal. No scleral icterus.   Neck: Trachea normal. Neck supple. No spinous process tenderness and no muscular tenderness present. No neck rigidity. No edema, no erythema and normal range of motion present. No thyroid mass and no thyromegaly present.   Cardiovascular: Normal rate, regular rhythm, normal heart sounds and intact distal pulses. Exam reveals no gallop and no friction rub.   No murmur heard.  Pulmonary/Chest: Effort normal and breath sounds normal. No stridor. No respiratory distress. She has no wheezes. She has no rales. She exhibits no tenderness.   Abdominal: Soft. Bowel sounds are normal. She exhibits no distension.   Musculoskeletal: Normal range of motion.   Lymphadenopathy:        Head (right side): No submental, no submandibular, no tonsillar, no preauricular and no posterior auricular adenopathy present.        Head (left side): No  submental, no submandibular, no tonsillar, no preauricular, no posterior auricular and no occipital adenopathy present.   Neurological: She is alert and oriented to person, place, and time.   Skin: Skin is warm and dry. Rash noted. No purpura noted. Rash is vesicular. Rash is not macular, not papular, not maculopapular, not nodular, not pustular and not urticarial.        Psychiatric: She has a normal mood and affect. Her behavior is normal. Judgment and thought content normal.   Vitals reviewed.      Assessment:       1. Herpes zoster without complication    2. Rash    3. Itching        Plan:       Valtrex 1g po TID x 7 days.  Bactroban ointment to rash BID x 7 days.  OTC Benadryl prn.  OTC Tylenol or NSAID's prn.  Report to ED for severe or worsening symptoms.  F/U with PCP.  F/U with Dermatology.  RTC prn.

## 2019-07-11 ENCOUNTER — PATIENT MESSAGE (OUTPATIENT)
Dept: INTERNAL MEDICINE | Facility: CLINIC | Age: 29
End: 2019-07-11

## 2019-07-11 ENCOUNTER — OFFICE VISIT (OUTPATIENT)
Dept: URGENT CARE | Facility: CLINIC | Age: 29
End: 2019-07-11
Payer: COMMERCIAL

## 2019-07-11 VITALS
TEMPERATURE: 98 F | OXYGEN SATURATION: 98 % | HEIGHT: 62 IN | SYSTOLIC BLOOD PRESSURE: 113 MMHG | HEART RATE: 48 BPM | WEIGHT: 120 LBS | BODY MASS INDEX: 22.08 KG/M2 | DIASTOLIC BLOOD PRESSURE: 70 MMHG

## 2019-07-11 DIAGNOSIS — S90.121A CONTUSION OF FIFTH TOE OF RIGHT FOOT, INITIAL ENCOUNTER: Primary | ICD-10-CM

## 2019-07-11 PROCEDURE — 99214 OFFICE O/P EST MOD 30 MIN: CPT | Mod: S$GLB,,, | Performed by: FAMILY MEDICINE

## 2019-07-11 PROCEDURE — 3008F PR BODY MASS INDEX (BMI) DOCUMENTED: ICD-10-PCS | Mod: CPTII,S$GLB,, | Performed by: FAMILY MEDICINE

## 2019-07-11 PROCEDURE — 73630 XR FOOT COMPLETE 3 VIEW RIGHT: ICD-10-PCS | Mod: FY,RT,S$GLB, | Performed by: RADIOLOGY

## 2019-07-11 PROCEDURE — 3008F BODY MASS INDEX DOCD: CPT | Mod: CPTII,S$GLB,, | Performed by: FAMILY MEDICINE

## 2019-07-11 PROCEDURE — 73630 X-RAY EXAM OF FOOT: CPT | Mod: FY,RT,S$GLB, | Performed by: RADIOLOGY

## 2019-07-11 PROCEDURE — 99214 PR OFFICE/OUTPT VISIT, EST, LEVL IV, 30-39 MIN: ICD-10-PCS | Mod: S$GLB,,, | Performed by: FAMILY MEDICINE

## 2019-07-11 NOTE — PATIENT INSTRUCTIONS
PLEASE READ YOUR DISCHARGE INSTRUCTIONS ENTIRELY AS IT CONTAINS IMPORTANT INFORMATION.    Tylenol and ibuprofen for pain    Rest, ice, compress, and elevate at home    Avoid prolonged use of the affected area until better.     Please return or see your primary care doctor if you develop new or worsening symptoms.     Please arrange follow up with your primary medical clinic as soon as possible. You must understand that you've received an Urgent Care treatment only and that you may be released before all of your medical problems are known or treated. You, the patient, will arrange for follow up as instructed. If your symptoms worsen or fail to improve you should go to the Emergency Room.    Bruises (Contusions)    A contusion is a bruise. A bruise happens when a blow to your body doesn't break the skin but does break blood vessels beneath the skin. Blood leaking from the broken vessels causes redness and swelling. As it heals, your bruise is likely to turn colors like purple, green, and yellow. This is normal. The bruise should fade in 2 or 3 weeks.  Factors that make you more likely to bruise  Almost everyone bruises now and then. Certain people do bruise more easily than others. You're more prone to bruising as you get older. That's because blood vessels become more fragile with age. You're also more likely to bruise if you have a clotting disorder such as hemophilia or take medications that reduce clotting, including aspirin, coumadin, newer agents.  When to go to the emergency room (ER)  Bruises almost always heal on their own without special treatment. But for some people, a bad bruise can be serious. Seek medical care if you:  · Have a clotting disorder such as hemophilia.  · Have cirrhosis or other serious liver disease.  · Take blood-thinning medications such as warfarin (Coumadin).  What to expect in the ER  A doctor will examine your bruise and ask about any health conditions you have. In some cases, you  may have a test to check how well your blood clots. Other treatment will depend on your needs.  Follow-up care  Sometimes a bruise gets worse instead of better. It may become larger and more swollen. This can occur when your body walls off a small pool of blood under the skin (hematoma). In very rare cases, your doctor may need to drain excess blood from the area.  Tip:  Apply an ice pack or bag of frozen peas to a bruise (keep a thin cloth between the cold source and your skin). This can help reduce redness and swelling.   Date Last Reviewed: 12/1/2016  © 0509-8715 [employer]. [address] [place] [address], [place] [address]. All rights reserved. This information is not intended as a substitute for professional medical care. Always follow your healthcare professional's instructions.

## 2019-07-11 NOTE — PROGRESS NOTES
"Subjective:       Patient ID: Daphne Sullivan is a 28 y.o. female.    Vitals:  height is 5' 2" (1.575 m) and weight is 54.4 kg (120 lb). Her oral temperature is 97.7 °F (36.5 °C). Her blood pressure is 113/70 and her pulse is 48 (abnormal). Her oxygen saturation is 98%.     Chief Complaint: Foot Pain    Pt states she was playing soccer 5 days ago and was stepped on with a cleat from another player. Pain and bruising improved just wanted to make sure not broken.     Foot Pain   This is a new problem. The current episode started in the past 7 days. The problem occurs daily. The problem has been waxing and waning. Pertinent negatives include no abdominal pain, fatigue, joint swelling, vertigo, vomiting or weakness. The symptoms are aggravated by walking. She has tried ice, NSAIDs and immobilization for the symptoms. The treatment provided moderate relief.       Constitution: Negative for fatigue.   HENT: Negative for facial swelling and facial trauma.    Neck: Negative for neck stiffness.   Cardiovascular: Negative for chest trauma.   Eyes: Negative for eye trauma, double vision and blurred vision.   Gastrointestinal: Negative for abdominal trauma, abdominal pain, vomiting and rectal bleeding.   Genitourinary: Negative for hematuria, missed menses, genital trauma and pelvic pain.   Musculoskeletal: Positive for pain and trauma. Negative for joint swelling and abnormal ROM of joint.   Skin: Negative for color change, wound, abrasion, laceration and bruising.   Neurological: Negative for dizziness, history of vertigo, light-headedness, coordination disturbances, altered mental status and loss of consciousness.   Hematologic/Lymphatic: Negative for history of bleeding disorder.   Psychiatric/Behavioral: Negative for altered mental status.       Objective:      Physical Exam   Constitutional: She is oriented to person, place, and time. She appears well-developed and well-nourished. She is cooperative.  Non-toxic " appearance. She does not appear ill. No distress.   HENT:   Head: Normocephalic and atraumatic. Head is without abrasion, without contusion and without laceration.   Right Ear: Hearing, tympanic membrane, external ear and ear canal normal. No hemotympanum.   Left Ear: Hearing, tympanic membrane, external ear and ear canal normal. No hemotympanum.   Nose: Nose normal. No mucosal edema, rhinorrhea or nasal deformity. No epistaxis. Right sinus exhibits no maxillary sinus tenderness and no frontal sinus tenderness. Left sinus exhibits no maxillary sinus tenderness and no frontal sinus tenderness.   Mouth/Throat: Uvula is midline, oropharynx is clear and moist and mucous membranes are normal. No trismus in the jaw. Normal dentition. No uvula swelling. No posterior oropharyngeal erythema.   Eyes: Pupils are equal, round, and reactive to light. Conjunctivae, EOM and lids are normal. Right eye exhibits no discharge. Left eye exhibits no discharge. No scleral icterus.   Sclera clear bilat   Neck: Trachea normal, normal range of motion, full passive range of motion without pain and phonation normal. Neck supple. No spinous process tenderness and no muscular tenderness present. No neck rigidity. No tracheal deviation present.   Cardiovascular: Normal rate, regular rhythm, normal heart sounds, intact distal pulses and normal pulses.   Pulmonary/Chest: Effort normal and breath sounds normal. No respiratory distress.   Abdominal: Soft. Normal appearance and bowel sounds are normal. She exhibits no distension, no pulsatile midline mass and no mass. There is no tenderness.   Musculoskeletal: Normal range of motion. She exhibits no edema or deformity.        Right foot: There is bony tenderness. There is normal range of motion, no tenderness, no swelling, normal capillary refill, no crepitus, no deformity and no laceration.        Feet:    Cap refill 2 seconds, radial pulse 2+, sensation intact  Very mild bruising     Neurological:  She is alert and oriented to person, place, and time. She has normal strength. No cranial nerve deficit or sensory deficit. She exhibits normal muscle tone. She displays no seizure activity. Coordination normal. GCS eye subscore is 4. GCS verbal subscore is 5. GCS motor subscore is 6.   Skin: Skin is warm, dry and intact. Capillary refill takes less than 2 seconds. No abrasion, no bruising, no burn, no ecchymosis and no laceration noted. She is not diaphoretic. No pallor.   No open wounds   Psychiatric: She has a normal mood and affect. Her speech is normal and behavior is normal. Judgment and thought content normal. Cognition and memory are normal.   Nursing note and vitals reviewed.    X-ray Foot Complete 3 View Right    Result Date: 7/11/2019  EXAMINATION: XR FOOT COMPLETE 3 VIEW RIGHT CLINICAL HISTORY: . Pain in right foot TECHNIQUE: AP, lateral, and oblique views of the right foot were performed. COMPARISON: None. FINDINGS: There is questionable mild irregularity at the medial base of the 2nd metatarsal.  Lisfranc articulation is congruent.  Cartilage spaces are maintained.  Soft tissue swelling noted.     Questionable mild irregularity at the 2nd metatarsal base.  If pain is present at this site, recommend further evaluation with CT. Electronically signed by: Michael Talbot MD Date:    07/11/2019 Time:    16:03    Assessment:       1. Contusion of fifth toe of right foot, initial encounter        Plan:         Contusion of fifth toe of right foot, initial encounter  -     X-Ray Foot Complete 3 view Right; Future; Expected date: 07/11/2019    xray noted irregularity over the base of the 2nd MT but she is not point tender there. I do not suspect fracture in the areas she is tender xray looks clear. RICE and nsaids. Offered ace wrap she declined.    Patient Instructions     PLEASE READ YOUR DISCHARGE INSTRUCTIONS ENTIRELY AS IT CONTAINS IMPORTANT INFORMATION.    Tylenol and ibuprofen for pain    Rest, ice,  compress, and elevate at home    Avoid prolonged use of the affected area until better.     Please return or see your primary care doctor if you develop new or worsening symptoms.     Please arrange follow up with your primary medical clinic as soon as possible. You must understand that you've received an Urgent Care treatment only and that you may be released before all of your medical problems are known or treated. You, the patient, will arrange for follow up as instructed. If your symptoms worsen or fail to improve you should go to the Emergency Room.    Bruises (Contusions)    A contusion is a bruise. A bruise happens when a blow to your body doesn't break the skin but does break blood vessels beneath the skin. Blood leaking from the broken vessels causes redness and swelling. As it heals, your bruise is likely to turn colors like purple, green, and yellow. This is normal. The bruise should fade in 2 or 3 weeks.  Factors that make you more likely to bruise  Almost everyone bruises now and then. Certain people do bruise more easily than others. You're more prone to bruising as you get older. That's because blood vessels become more fragile with age. You're also more likely to bruise if you have a clotting disorder such as hemophilia or take medications that reduce clotting, including aspirin, coumadin, newer agents.  When to go to the emergency room (ER)  Bruises almost always heal on their own without special treatment. But for some people, a bad bruise can be serious. Seek medical care if you:  · Have a clotting disorder such as hemophilia.  · Have cirrhosis or other serious liver disease.  · Take blood-thinning medications such as warfarin (Coumadin).  What to expect in the ER  A doctor will examine your bruise and ask about any health conditions you have. In some cases, you may have a test to check how well your blood clots. Other treatment will depend on your needs.  Follow-up care  Sometimes a bruise gets  worse instead of better. It may become larger and more swollen. This can occur when your body walls off a small pool of blood under the skin (hematoma). In very rare cases, your doctor may need to drain excess blood from the area.  Tip:  Apply an ice pack or bag of frozen peas to a bruise (keep a thin cloth between the cold source and your skin). This can help reduce redness and swelling.   Date Last Reviewed: 12/1/2016 © 2000-2017 [employer]. [address] [place] [address], [place] [address]. All rights reserved. This information is not intended as a substitute for professional medical care. Always follow your healthcare professional's instructions.

## 2019-07-26 DIAGNOSIS — Z30.8 ENCOUNTER FOR OTHER CONTRACEPTIVE MANAGEMENT: ICD-10-CM

## 2019-07-29 ENCOUNTER — PATIENT OUTREACH (OUTPATIENT)
Dept: ADMINISTRATIVE | Facility: OTHER | Age: 29
End: 2019-07-29

## 2019-07-31 ENCOUNTER — OFFICE VISIT (OUTPATIENT)
Dept: OBSTETRICS AND GYNECOLOGY | Facility: CLINIC | Age: 29
End: 2019-07-31
Attending: OBSTETRICS & GYNECOLOGY
Payer: COMMERCIAL

## 2019-07-31 VITALS
BODY MASS INDEX: 21.38 KG/M2 | DIASTOLIC BLOOD PRESSURE: 60 MMHG | HEIGHT: 62 IN | SYSTOLIC BLOOD PRESSURE: 108 MMHG | WEIGHT: 116.19 LBS

## 2019-07-31 DIAGNOSIS — Z30.8 ENCOUNTER FOR OTHER CONTRACEPTIVE MANAGEMENT: ICD-10-CM

## 2019-07-31 DIAGNOSIS — Z01.419 WELL FEMALE EXAM WITH ROUTINE GYNECOLOGICAL EXAM: Primary | ICD-10-CM

## 2019-07-31 PROCEDURE — 99395 PR PREVENTIVE VISIT,EST,18-39: ICD-10-PCS | Mod: S$GLB,,, | Performed by: OBSTETRICS & GYNECOLOGY

## 2019-07-31 PROCEDURE — 99999 PR PBB SHADOW E&M-EST. PATIENT-LVL III: ICD-10-PCS | Mod: PBBFAC,,, | Performed by: OBSTETRICS & GYNECOLOGY

## 2019-07-31 PROCEDURE — 88175 CYTOPATH C/V AUTO FLUID REDO: CPT

## 2019-07-31 PROCEDURE — 99395 PREV VISIT EST AGE 18-39: CPT | Mod: S$GLB,,, | Performed by: OBSTETRICS & GYNECOLOGY

## 2019-07-31 PROCEDURE — 99999 PR PBB SHADOW E&M-EST. PATIENT-LVL III: CPT | Mod: PBBFAC,,, | Performed by: OBSTETRICS & GYNECOLOGY

## 2019-07-31 RX ORDER — NORETHINDRONE ACETATE AND ETHINYL ESTRADIOL 1MG-20(21)
1 KIT ORAL DAILY
Qty: 90 TABLET | Refills: 3 | Status: SHIPPED | OUTPATIENT
Start: 2019-07-31 | End: 2020-06-22

## 2019-08-02 NOTE — PROGRESS NOTES
SUBJECTIVE:   28 y.o. female   for routine gyn exam. No LMP recorded (lmp unknown). Patient has had an implant..  She has no unusual complaints.  Desires refill of OCP's.         Past Medical History:   Diagnosis Date    Anxiety disorder      Past Surgical History:   Procedure Laterality Date    EXAM UNDER ANESTHESIA N/A 2013    Performed by Tam [facility] MD at [facility] OR [address] [place] [address]    INJECTION, BOTULINUM TOXIN, TYPE A N/A 2013    Performed by Tam Gay MD at [facility] OR [address] [place] [address]    NO PAST SURGERIES      REMOVAL, SKIN TAG N/A 2013    Performed by Tam Gay MD at [facility] OR [address] [place] [address]     Social History     Socioeconomic History    Marital status: Single     Spouse name: Not on file    Number of children: Not on file    Years of education: Not on file    Highest education level: Not on file   Occupational History    Not on file   Social Needs    Financial resource strain: Not on file    Food insecurity:     Worry: Not on file     Inability: Not on file    Transportation needs:     Medical: Not on file     Non-medical: Not on file   Tobacco Use    Smoking status: Never Smoker    Smokeless tobacco: Never Used   Substance and Sexual Activity    Alcohol use: Yes     Comment: socially    Drug use: No    Sexual activity: Yes     Partners: Male     Birth control/protection: Condom, OCP   Lifestyle    Physical activity:     Days per week: Not on file     Minutes per session: Not on file    Stress: Not on file   Relationships    Social connections:     Talks on phone: Not on file     Gets together: Not on file     Attends [employer] service: Not on file     Active member of club or organization: Not on file     Attends meetings of clubs or organizations: Not on file     Relationship status: Not on file   Other Topics Concern    Not on file   Social History Narrative    Not on file     Family History   Problem Relation Age of Onset    Heart disease Father         CAD--MI     "Breast cancer Neg Hx     Cancer Neg Hx     Colon cancer Neg Hx     Diabetes Neg Hx     Eclampsia Neg Hx     Ovarian cancer Neg Hx      labor Neg Hx     Hypertension Neg Hx     Miscarriages / Stillbirths Neg Hx     Stroke Neg Hx      OB History    Para Term  AB Living   0             SAB TAB Ectopic Multiple Live Births                     Current Outpatient Medications   Medication Sig Dispense Refill    norethindrone-ethinyl estradiol (JUNEL FE 1/20) 1 mg-20 mcg (21)/75 mg (7) per tablet Take 1 tablet by mouth once daily. 90 tablet 3     No current facility-administered medications for this visit.      Allergies: Patient has no known allergies.     ROS:  Constitutional: no weight loss, weight gain, fever, fatigue  Eyes:  No vision changes, glasses/contacts  ENT/Mouth: No ulcers, sinus problems, ears ringing, headache  Cardiovascular: No inability to lie flat, chest pain, exercise intolerance, swelling, heart palpitations  Respiratory: No wheezing, coughing blood, shortness of breath, or cough  Gastrointestinal: No diarrhea, bloody stool, nausea/vomiting, constipation, gas, hemorrhoids  Genitourinary: No blood in urine, painful urination, urgency of urination, frequency of urination, incomplete emptying, incontinence, abnormal bleeding, painful periods, heavy periods, vaginal discharge, vaginal odor, painful intercourse, sexual problems, bleeding after intercourse.  Musculoskeletal: No muscle weakness  Skin/Breast: No painful breasts, nipple discharge, masses, rash, ulcers  Neurological: No passing out, seizures, numbness, headache  Endocrine: No diabetes, hypothyroid, hyperthyroid, hot flashes, hair loss, abnormal hair growth, ance  Psychiatric: No depression, crying  Hematologic: No bruises, bleeding, swollen lymph nodes, anemia.      OBJECTIVE:   The patient appears well, alert, oriented x 3, in no distress.  /60   Ht 5' 2" (1.575 m)   Wt 52.7 kg (116 lb 2.9 oz)   LMP  (LMP " Unknown)   BMI 21.25 kg/m²   NECK: no thyromegaly, trachea midline  SKIN: no acne, striae, hirsutism  CHEST: CTAB  CV: RRR  BREAST EXAM: breasts appear normal, no suspicious masses, no skin or nipple changes or axillary nodes  ABDOMEN: no hernias, masses, or hepatosplenomegaly  GENITALIA: normal external genitalia, no erythema, no discharge  URETHRA: normal urethra, normal urethral meatus  VAGINA: Normal  CERVIX: no lesions or cervical motion tenderness  UTERUS: normal size, contour, position, consistency, mobility, non-tender  ADNEXA: no mass, fullness, tenderness      ASSESSMENT:   1. Well female exam with routine gynecological exam  Liquid-based pap smear, screening   2. Encounter for other contraceptive management  norethindrone-ethinyl estradiol (JUNEL FE 1/20) 1 mg-20 mcg (21)/75 mg (7) per tablet       PLAN:   Orders Placed This Encounter    Liquid-based pap smear, screening    norethindrone-ethinyl estradiol (JUNEL FE 1/20) 1 mg-20 mcg (21)/75 mg (7) per tablet     Return to clinic in 1 year

## 2019-08-05 RX ORDER — NORETHINDRONE ACETATE AND ETHINYL ESTRADIOL AND FERROUS FUMARATE 1MG-20(21)
KIT ORAL
Qty: 28 TABLET | Refills: 0 | Status: SHIPPED | OUTPATIENT
Start: 2019-08-05 | End: 2021-07-26

## 2019-11-04 ENCOUNTER — OFFICE VISIT (OUTPATIENT)
Dept: URGENT CARE | Facility: CLINIC | Age: 29
End: 2019-11-04
Payer: COMMERCIAL

## 2019-11-04 VITALS
BODY MASS INDEX: 21.35 KG/M2 | RESPIRATION RATE: 18 BRPM | WEIGHT: 116 LBS | DIASTOLIC BLOOD PRESSURE: 81 MMHG | HEIGHT: 62 IN | HEART RATE: 56 BPM | SYSTOLIC BLOOD PRESSURE: 117 MMHG | OXYGEN SATURATION: 99 % | TEMPERATURE: 98 F

## 2019-11-04 DIAGNOSIS — J06.9 VIRAL URI: Primary | ICD-10-CM

## 2019-11-04 PROCEDURE — 99214 PR OFFICE/OUTPT VISIT, EST, LEVL IV, 30-39 MIN: ICD-10-PCS | Mod: S$GLB,,, | Performed by: FAMILY MEDICINE

## 2019-11-04 PROCEDURE — 3008F BODY MASS INDEX DOCD: CPT | Mod: CPTII,S$GLB,, | Performed by: FAMILY MEDICINE

## 2019-11-04 PROCEDURE — 3008F PR BODY MASS INDEX (BMI) DOCUMENTED: ICD-10-PCS | Mod: CPTII,S$GLB,, | Performed by: FAMILY MEDICINE

## 2019-11-04 PROCEDURE — 99214 OFFICE O/P EST MOD 30 MIN: CPT | Mod: S$GLB,,, | Performed by: FAMILY MEDICINE

## 2019-11-04 RX ORDER — PREDNISONE 20 MG/1
40 TABLET ORAL DAILY
Qty: 6 TABLET | Refills: 0 | Status: SHIPPED | OUTPATIENT
Start: 2019-11-04 | End: 2019-11-07

## 2019-11-04 NOTE — PROGRESS NOTES
"Subjective:       Patient ID: Daphne Sullivan is a 29 y.o. female.    Vitals:  height is 5' 2" (1.575 m) and weight is 52.6 kg (116 lb). Her temperature is 97.9 °F (36.6 °C). Her blood pressure is 117/81 and her pulse is 56 (abnormal). Her respiration is 18 and oxygen saturation is 99%.     Chief Complaint: Sinus Problem    29-year-old female with 3-4 day history of progressive chest congestion. Nonsmoker.  No history of asthma or reactive airways.  Cough is worse at night.  No fever or colored sputum.  Played soccer yesterday.    Cough   This is a new problem. The current episode started in the past 7 days. The problem has been unchanged. The problem occurs constantly. The cough is non-productive. Associated symptoms include ear congestion, myalgias, nasal congestion, postnasal drip, a sore throat, shortness of breath and wheezing. Pertinent negatives include no chills, ear pain, eye redness, fever, hemoptysis or rash. Nothing aggravates the symptoms. She has tried OTC cough suppressant for the symptoms. The treatment provided no relief.       Constitution: Negative for chills, sweating, fatigue and fever.   HENT: Positive for congestion, postnasal drip, sore throat and voice change. Negative for ear pain, sinus pain and sinus pressure.    Neck: Negative for painful lymph nodes.   Eyes: Negative for eye redness.   Respiratory: Positive for cough, shortness of breath and wheezing. Negative for chest tightness, sputum production, bloody sputum, COPD, stridor and asthma.    Gastrointestinal: Negative for nausea and vomiting.   Musculoskeletal: Positive for muscle ache.   Skin: Negative for rash.   Allergic/Immunologic: Negative for seasonal allergies and asthma.   Hematologic/Lymphatic: Negative for swollen lymph nodes.       Objective:      Physical Exam   Constitutional: She is oriented to person, place, and time. She appears well-developed and well-nourished. She is cooperative.  Non-toxic appearance. She " does not have a sickly appearance. She does not appear ill. No distress.   HENT:   Head: Normocephalic and atraumatic.   Right Ear: Hearing, tympanic membrane, external ear and ear canal normal.   Left Ear: Hearing, tympanic membrane, external ear and ear canal normal.   Nose: Nose normal. No mucosal edema, rhinorrhea or nasal deformity. No epistaxis. Right sinus exhibits no maxillary sinus tenderness and no frontal sinus tenderness. Left sinus exhibits no maxillary sinus tenderness and no frontal sinus tenderness.   Mouth/Throat: Uvula is midline, oropharynx is clear and moist and mucous membranes are normal. No trismus in the jaw. Normal dentition. No uvula swelling. No oropharyngeal exudate, posterior oropharyngeal edema or posterior oropharyngeal erythema.   Normal pharynx.   Eyes: Conjunctivae and lids are normal. No scleral icterus.   Neck: Trachea normal, normal range of motion, full passive range of motion without pain and phonation normal. Neck supple. No neck rigidity. No edema and no erythema present.   Cardiovascular: Normal rate, regular rhythm, normal heart sounds, intact distal pulses and normal pulses.   Pulmonary/Chest: Effort normal and breath sounds normal. No stridor. No respiratory distress. She has no decreased breath sounds. She has no rhonchi. She has no rales.   Clear to auscultation bilaterally except faint wheeze with forced exhalation.  No rales.   Abdominal: Soft. Normal appearance and bowel sounds are normal. She exhibits no distension. There is no tenderness. There is no guarding.   Musculoskeletal: Normal range of motion. She exhibits no edema or deformity.   Lymphadenopathy:     She has no cervical adenopathy.   Neurological: She is alert and oriented to person, place, and time. She exhibits normal muscle tone. Coordination normal.   Skin: Skin is warm, dry, intact, not diaphoretic and not pale.   Psychiatric: She has a normal mood and affect. Her speech is normal and behavior is  normal. Judgment and thought content normal. Cognition and memory are normal.   Nursing note and vitals reviewed.        Assessment:       1. Viral URI        Plan:         Viral URI  -     predniSONE (DELTASONE) 20 MG tablet; Take 2 tablets (40 mg total) by mouth once daily. for 3 days  Dispense: 6 tablet; Refill: 0    Make sure that you follow up with your primary care doctor in the next 2-5 days if needed .  Return to the Urgent Care if signs or symptoms change and certainly if you have worsening symptoms go to the nearest emergency department for further evaluation.

## 2019-11-04 NOTE — PATIENT INSTRUCTIONS
Viral Upper Respiratory Illness (Adult)  You have a viral upper respiratory illness (URI), which is another term for the common cold. This illness is contagious during the first few days. It is spread through the air by coughing and sneezing. It may also be spread by direct contact (touching the sick person and then touching your own eyes, nose, or mouth). Frequent handwashing will decrease risk of spread. Most viral illnesses go away within 7 to 10 days with rest and simple home remedies. Sometimes the illness may last for several weeks. Antibiotics will not kill a virus, and they are generally not prescribed for this condition.    Home care  · If symptoms are severe, rest at home for the first 2 to 3 days. When you resume activity, don't let yourself get too tired.  · Avoid being exposed to cigarette smoke (yours or others).  · You may use acetaminophen or ibuprofen to control pain and fever, unless another medicine was prescribed. (Note: If you have chronic liver or kidney disease, have ever had a stomach ulcer or gastrointestinal bleeding, or are taking blood-thinning medicines, talk with your healthcare provider before using these medicines.) Aspirin should never be given to anyone under 18 years of age who is ill with a viral infection or fever. It may cause severe liver or brain damage.  · Your appetite may be poor, so a light diet is fine. Avoid dehydration by drinking 6 to 8 glasses of fluids per day (water, soft drinks, juices, tea, or soup). Extra fluids will help loosen secretions in the nose and lungs.  · Over-the-counter cold medicines will not shorten the length of time youre sick, but they may be helpful for the following symptoms: cough, sore throat, and nasal and sinus congestion. (Note: Do not use decongestants if you have high blood pressure.)  Follow-up care  Follow up with your healthcare provider, or as advised.  When to seek medical advice  Call your healthcare provider right away if any  of these occur:  · Cough with lots of colored sputum (mucus)  · Severe headache; face, neck, or ear pain  · Difficulty swallowing due to throat pain  · Fever of 100.4°F (38°C)  Call 911, or get immediate medical care  Call emergency services right away if any of these occur:  · Chest pain, shortness of breath, wheezing, or difficulty breathing  · Coughing up blood  · Inability to swallow due to throat pain  Date Last Reviewed: 9/13/2015  © 2805-6252 [employer]. [address] [place] [address]. All rights reserved. This information is not intended as a substitute for professional medical care. Always follow your healthcare professional's instructions.        Make sure that you follow up with your primary care doctor in the next 2-5 days if needed .  Return to the Urgent Care if signs or symptoms change and certainly if you have worsening symptoms go to the nearest emergency department for further evaluation.

## 2020-03-12 ENCOUNTER — PATIENT MESSAGE (OUTPATIENT)
Dept: INTERNAL MEDICINE | Facility: CLINIC | Age: 30
End: 2020-03-12

## 2020-03-12 DIAGNOSIS — Z00.00 ANNUAL PHYSICAL EXAM: Primary | ICD-10-CM

## 2020-03-12 RX ORDER — LORAZEPAM 0.5 MG/1
0.5 TABLET ORAL 2 TIMES DAILY PRN
Qty: 20 TABLET | Refills: 0 | Status: CANCELLED | OUTPATIENT
Start: 2020-03-12 | End: 2020-04-11

## 2020-03-19 ENCOUNTER — PATIENT MESSAGE (OUTPATIENT)
Dept: INTERNAL MEDICINE | Facility: CLINIC | Age: 30
End: 2020-03-19

## 2020-03-19 DIAGNOSIS — F41.8 OTHER SPECIFIED ANXIETY DISORDERS: Primary | ICD-10-CM

## 2020-03-23 ENCOUNTER — OFFICE VISIT (OUTPATIENT)
Dept: INTERNAL MEDICINE | Facility: CLINIC | Age: 30
End: 2020-03-23
Attending: INTERNAL MEDICINE
Payer: COMMERCIAL

## 2020-03-23 DIAGNOSIS — F41.8 SITUATIONAL ANXIETY: Primary | ICD-10-CM

## 2020-03-23 PROCEDURE — 99213 OFFICE O/P EST LOW 20 MIN: CPT | Mod: 95,,, | Performed by: INTERNAL MEDICINE

## 2020-03-23 PROCEDURE — 99213 PR OFFICE/OUTPT VISIT, EST, LEVL III, 20-29 MIN: ICD-10-PCS | Mod: 95,,, | Performed by: INTERNAL MEDICINE

## 2020-03-23 RX ORDER — ALPRAZOLAM 0.5 MG/1
0.5 TABLET ORAL 3 TIMES DAILY PRN
Qty: 20 TABLET | Refills: 0 | Status: SHIPPED | OUTPATIENT
Start: 2020-03-23 | End: 2022-03-03 | Stop reason: SDUPTHER

## 2020-03-23 NOTE — PATIENT INSTRUCTIONS
Anxiety Reaction  Anxiety is the feeling we all get when we think something bad might happen. It is a normal response to stress and usually causes only a mild reaction. When anxiety becomes more severe, it can interfere with daily life. In some cases, you may not even be aware of what it is youre anxious about. There may also be a genetic link or it may be a learned behavior in the home.  Both psychological and physical triggers cause stress reaction. It's often a response to fear or emotional stress, real or imagined. This stress may come from home, family, work, or social relationships.  During an anxiety reaction, you may feel:  · Helpless  · Nervous  · Depressed  · Irritable  Your body may show signs of anxiety in many ways. You may experience:  · Dry mouth  · Shakiness  · Dizziness  · Weakness  · Trouble breathing  · Breathing fast (hyperventilating)  · Chest pressure  · Sweating  · Headache  · Nausea  · Diarrhea  · Tiredness  · Inability to sleep  · Sexual problems  Home care  · Try to locate the sources of stress in your life. They may not be obvious. These may include:  ¨ Daily hassles of life (traffic jams, missed appointments, car troubles, etc.)  ¨ Major life changes, both good (new baby, job promotion) and bad (loss of job, loss of loved one)  ¨ Overload: feeling that you have too many responsibilities and can't take care of all of them at once  ¨ Feeling helpless, feeling that your problems are beyond what youre able to solve  · Notice how your body reacts to stress. Learn to listen to your body signals. This will help you take action before the stress becomes severe.  · When you can, do something about the source of your stress. (Avoid hassles, limit the amount of change that happens in your life at one time and take a break when you feel overloaded).  · Unfortunately, many stressful situations can't be avoided. It is necessary to learn how to better manage stress. There are many proven methods  that will reduce your anxiety. These include simple things like exercise, good nutrition and adequate rest. Also, there are certain techniques that are helpful:  ¨ Relaxation  ¨ Breathing exercises  ¨ Visualization  ¨ Biofeedback  ¨ Meditation  For more information about this, consult your doctor or go to a local bookstore and review the many books and tapes available on this subject.  Follow-up care  If you feel that your anxiety is not responding to self-help measures, contact your doctor or make an appointment with a counselor. You may need short-term psychological counseling and temporary medicine to help you manage stress.  Call 911  Call your healthcare provider right away if any of these occur:  · Trouble breathing  · Confusion  · Drowsiness or trouble wakening  · Fainting or loss of consciousness  · Rapid heart rate  · Seizure  · New chest pain that becomes more severe, lasts longer, or spreads into your shoulder, arm, neck, jaw, or back  When to seek medical advice  Call your healthcare provider right away if any of these occur:  · Your symptoms get worse  · Severe headache not relieved by rest and mild pain reliever  Date Last Reviewed: 9/29/2015  © 2507-7000 [employer]. [address] [place] [id number]. All rights reserved. This information is not intended as a substitute for professional medical care. Always follow your healthcare professional's instructions.

## 2020-03-23 NOTE — PROGRESS NOTES
Subjective:       Patient ID: Daphne Sullivan is a 29 y.o. female.    Chief Complaint: Anxiety     Daphne Sullivan is a 29 y.o.  female who presents for Anxiety  .  Patient Active Problem List   Diagnosis    Anal fissure    Fissure in ano    Nexplanon in place- exp. 2020     Has a history of anxiety with flying, has been concerned that she may get anxious during stress of current covid pandemic.    Walking for exercise- usually 4-5 days of vigorous exercise but unable now due to social distancing. Occasional insomnia, chronic and mild.  Concerned she may need to use xanax during this stressful time. Has used it very sparingly in the past.  reviewed and confirmed minimal usage.      Health Maintenance       Date Due Completion Date    Influenza Vaccine (1) 2019 ---    Pap Smear with HPV Cotest 2022    TETANUS VACCINE 2029          Review of Systems   Constitutional: Negative for chills and fever.   HENT: Negative for rhinorrhea and sneezing.    Respiratory: Negative for cough and shortness of breath.    Cardiovascular: Negative for chest pain and palpitations.   Gastrointestinal: Negative for abdominal pain, blood in stool, nausea and vomiting.   Genitourinary: Negative for dysuria and hematuria.   Musculoskeletal: Negative for arthralgias and back pain.   Neurological: Negative for syncope and numbness.   Psychiatric/Behavioral: Negative for dysphoric mood and sleep disturbance. The patient is nervous/anxious.          Past Medical History:   Diagnosis Date    Anxiety disorder        Past Surgical History:   Procedure Laterality Date    NO PAST SURGERIES         Family History   Problem Relation Age of Onset    Heart disease Father 67        CAD--MI    Breast cancer Neg Hx     Cancer Neg Hx     Colon cancer Neg Hx     Diabetes Neg Hx     Eclampsia Neg Hx     Ovarian cancer Neg Hx      labor Neg Hx     Hypertension Neg Hx      Miscarriages / Stillbirths Neg Hx     Stroke Neg Hx        Social History     Tobacco Use    Smoking status: Never Smoker    Smokeless tobacco: Never Used   Substance Use Topics    Alcohol use: Yes     Alcohol/week: 2.0 standard drinks     Types: 2 Standard drinks or equivalent per week     Comment: socially    Drug use: No             Objective:   There were no vitals taken for this visit.     Physical Exam   Constitutional: She is oriented to person, place, and time. She appears well-developed and well-nourished. No distress.   HENT:   Head: Normocephalic and atraumatic.   Eyes: Right eye exhibits no discharge. Left eye exhibits no discharge.   Pulmonary/Chest: Effort normal. No respiratory distress.   Neurological: She is alert and oriented to person, place, and time.   Skin: No erythema. No pallor.   Psychiatric: She has a normal mood and affect. Thought content normal.       Prior labs reviewed  Assessment/Plan:        Diagnoses and all orders for this visit:    Situational anxiety  The patient location is:  Patient Home   The chief complaint leading to consultation is: anxiety  Visit type: Virtual visit with synchronous audio and video      Total time spent with patient: 10    Each patient to whom he or she provides medical services by telemedicine is:  (1) informed of the relationship between the physician and patient and the respective role of any other health care provider with respect to management of the patient; and (2) notified that he or she may decline to receive medical services by telemedicine and may withdraw from such care at any time.    Refill prn benzo. Discussed risks vs benefits of this medicaiton including addictive potential and sedation. Questions answered. Patient is in agreement with current plan. Do not take with etoh or other sedatives, avoid opperating heavy equipment.  If needing regularly contact clinic to discuss possible ssri/snri use.             Medication List with  Changes/Refills   New Medications    ALPRAZOLAM (XANAX) 0.5 MG TABLET    Take 1 tablet (0.5 mg total) by mouth 3 (three) times daily as needed for Anxiety.   Current Medications    JUNEL FE 1/20, 28, 1 MG-20 MCG (21)/75 MG (7) PER TABLET    TAKE 1 TABLET BY MOUTH EVERY DAY    NORETHINDRONE-ETHINYL ESTRADIOL (JUNEL FE 1/20) 1 MG-20 MCG (21)/75 MG (7) PER TABLET    Take 1 tablet by mouth once daily.

## 2020-04-22 ENCOUNTER — PATIENT MESSAGE (OUTPATIENT)
Dept: OBSTETRICS AND GYNECOLOGY | Facility: CLINIC | Age: 30
End: 2020-04-22

## 2020-07-21 ENCOUNTER — OFFICE VISIT (OUTPATIENT)
Dept: OBSTETRICS AND GYNECOLOGY | Facility: CLINIC | Age: 30
End: 2020-07-21
Attending: OBSTETRICS & GYNECOLOGY
Payer: COMMERCIAL

## 2020-07-21 VITALS
WEIGHT: 125.69 LBS | BODY MASS INDEX: 23.73 KG/M2 | DIASTOLIC BLOOD PRESSURE: 70 MMHG | SYSTOLIC BLOOD PRESSURE: 110 MMHG | HEIGHT: 61 IN

## 2020-07-21 DIAGNOSIS — Z01.419 WELL FEMALE EXAM WITH ROUTINE GYNECOLOGICAL EXAM: Primary | ICD-10-CM

## 2020-07-21 DIAGNOSIS — Z30.8 ENCOUNTER FOR OTHER CONTRACEPTIVE MANAGEMENT: ICD-10-CM

## 2020-07-21 PROBLEM — Z97.5 NEXPLANON IN PLACE: Status: RESOLVED | Noted: 2017-12-12 | Resolved: 2020-07-21

## 2020-07-21 PROCEDURE — 99395 PR PREVENTIVE VISIT,EST,18-39: ICD-10-PCS | Mod: S$GLB,,, | Performed by: OBSTETRICS & GYNECOLOGY

## 2020-07-21 PROCEDURE — 99999 PR PBB SHADOW E&M-EST. PATIENT-LVL III: CPT | Mod: PBBFAC,,, | Performed by: OBSTETRICS & GYNECOLOGY

## 2020-07-21 PROCEDURE — 99999 PR PBB SHADOW E&M-EST. PATIENT-LVL III: ICD-10-PCS | Mod: PBBFAC,,, | Performed by: OBSTETRICS & GYNECOLOGY

## 2020-07-21 PROCEDURE — 99395 PREV VISIT EST AGE 18-39: CPT | Mod: S$GLB,,, | Performed by: OBSTETRICS & GYNECOLOGY

## 2020-07-21 RX ORDER — NORETHINDRONE ACETATE AND ETHINYL ESTRADIOL 1MG-20(21)
1 KIT ORAL DAILY
Qty: 84 TABLET | Refills: 3 | Status: SHIPPED | OUTPATIENT
Start: 2020-07-21 | End: 2021-07-06

## 2020-07-21 NOTE — PROGRESS NOTES
SUBJECTIVE:   29 y.o. female   for routine gyn exam. Patient's last menstrual period was 2020..  She has no unusual complaints.  Desires refill of OCP's.         Past Medical History:   Diagnosis Date    Anxiety disorder      Past Surgical History:   Procedure Laterality Date    NO PAST SURGERIES       Social History     Socioeconomic History    Marital status: Significant Other     Spouse name: Not on file    Number of children: Not on file    Years of education: Not on file    Highest education level: Not on file   Occupational History    Occupation:    Social Needs    Financial resource strain: Not on file    Food insecurity     Worry: Not on file     Inability: Not on file    Transportation needs     Medical: Not on file     Non-medical: Not on file   Tobacco Use    Smoking status: Never Smoker    Smokeless tobacco: Never Used   Substance and Sexual Activity    Alcohol use: Yes     Alcohol/week: 2.0 standard drinks     Types: 2 Standard drinks or equivalent per week     Comment: socially    Drug use: No    Sexual activity: Yes     Partners: Male     Birth control/protection: OCP   Lifestyle    Physical activity     Days per week: Not on file     Minutes per session: Not on file    Stress: Not on file   Relationships    Social connections     Talks on phone: Not on file     Gets together: Not on file     Attends [employer] service: Not on file     Active member of club or organization: Not on file     Attends meetings of clubs or organizations: Not on file     Relationship status: Not on file   Other Topics Concern    Not on file   Social History Narrative    Not on file     Family History   Problem Relation Age of Onset    Heart disease Father 67        CAD--MI    Breast cancer Neg Hx     Cancer Neg Hx     Colon cancer Neg Hx     Diabetes Neg Hx     Eclampsia Neg Hx     Ovarian cancer Neg Hx      labor Neg Hx     Hypertension Neg Hx     Miscarriages  "/ Stillbirths Neg Hx     Stroke Neg Hx      OB History    Para Term  AB Living   0         0   SAB TAB Ectopic Multiple Live Births           0         Current Outpatient Medications   Medication Sig Dispense Refill    ALPRAZolam (XANAX) 0.5 MG tablet Take 1 tablet (0.5 mg total) by mouth 3 (three) times daily as needed for Anxiety. 20 tablet 0    norethindrone-ethinyl estradiol (JUNEL FE 1/20, ,) 1 mg-20 mcg (21)/75 mg (7) per tablet Take 1 tablet by mouth once daily. 84 tablet 3     No current facility-administered medications for this visit.      Allergies: Patient has no known allergies.     ROS:  Constitutional: no weight loss, weight gain, fever, fatigue  Eyes:  No vision changes, glasses/contacts  ENT/Mouth: No ulcers, sinus problems, ears ringing, headache  Cardiovascular: No inability to lie flat, chest pain, exercise intolerance, swelling, heart palpitations  Respiratory: No wheezing, coughing blood, shortness of breath, or cough  Gastrointestinal: No diarrhea, bloody stool, nausea/vomiting, constipation, gas, hemorrhoids  Genitourinary: No blood in urine, painful urination, urgency of urination, frequency of urination, incomplete emptying, incontinence, abnormal bleeding, painful periods, heavy periods, vaginal discharge, vaginal odor, painful intercourse, sexual problems, bleeding after intercourse.  Musculoskeletal: No muscle weakness  Skin/Breast: No painful breasts, nipple discharge, masses, rash, ulcers  Neurological: No passing out, seizures, numbness, headache  Endocrine: No diabetes, hypothyroid, hyperthyroid, hot flashes, hair loss, abnormal hair growth, acne  Psychiatric: No depression, crying  Hematologic: No bruises, bleeding, swollen lymph nodes, anemia.      OBJECTIVE:   The patient appears well, alert, oriented x 3, in no distress.  /70 (BP Location: Right arm, Patient Position: Sitting, BP Method: Medium (Manual))   Ht 5' 1" (1.549 m)   Wt 57 kg (125 lb 10.6 oz)  "  LMP 07/13/2020   BMI 23.74 kg/m²   NECK: no thyromegaly, trachea midline  SKIN: no acne, striae, hirsutism  CHEST: CTAB  CV: RRR  BREAST EXAM: breasts appear normal, no suspicious masses, no skin or nipple changes or axillary nodes  ABDOMEN: no hernias, masses, or hepatosplenomegaly  GENITALIA: normal external genitalia, no erythema, no discharge  URETHRA: normal urethra, normal urethral meatus  VAGINA: Normal  CERVIX: no lesions or cervical motion tenderness  UTERUS: normal size, contour, position, consistency, mobility, non-tender  ADNEXA: no mass, fullness, tenderness      ASSESSMENT:   1. Well female exam with routine gynecological exam     2. Encounter for other contraceptive management  norethindrone-ethinyl estradiol (JUNEL FE 1/20, 28,) 1 mg-20 mcg (21)/75 mg (7) per tablet       PLAN:   Orders Placed This Encounter    norethindrone-ethinyl estradiol (JUNEL FE 1/20, 28,) 1 mg-20 mcg (21)/75 mg (7) per tablet     Discussed healthy lifestyle including regular exercise, healthy eating, etc.  Return to clinic in 1 year

## 2020-10-19 ENCOUNTER — OFFICE VISIT (OUTPATIENT)
Dept: URGENT CARE | Facility: CLINIC | Age: 30
End: 2020-10-19
Payer: COMMERCIAL

## 2020-10-19 VITALS
DIASTOLIC BLOOD PRESSURE: 78 MMHG | RESPIRATION RATE: 18 BRPM | SYSTOLIC BLOOD PRESSURE: 116 MMHG | HEIGHT: 61 IN | HEART RATE: 64 BPM | OXYGEN SATURATION: 98 % | BODY MASS INDEX: 23.6 KG/M2 | WEIGHT: 125 LBS | TEMPERATURE: 98 F

## 2020-10-19 DIAGNOSIS — Z11.9 SCREENING EXAMINATION FOR UNSPECIFIED INFECTIOUS DISEASE: Primary | ICD-10-CM

## 2020-10-19 DIAGNOSIS — Z20.822 CLOSE EXPOSURE TO COVID-19 VIRUS: ICD-10-CM

## 2020-10-19 LAB
CTP QC/QA: YES
SARS-COV-2 RDRP RESP QL NAA+PROBE: NEGATIVE

## 2020-10-19 PROCEDURE — U0002 COVID-19 LAB TEST NON-CDC: HCPCS | Mod: QW,S$GLB,, | Performed by: NURSE PRACTITIONER

## 2020-10-19 PROCEDURE — 99211 PR OFFICE/OUTPT VISIT, EST, LEVL I: ICD-10-PCS | Mod: S$GLB,,, | Performed by: NURSE PRACTITIONER

## 2020-10-19 PROCEDURE — 99211 OFF/OP EST MAY X REQ PHY/QHP: CPT | Mod: S$GLB,,, | Performed by: NURSE PRACTITIONER

## 2020-10-19 PROCEDURE — U0002: ICD-10-PCS | Mod: QW,S$GLB,, | Performed by: NURSE PRACTITIONER

## 2020-10-19 NOTE — PROGRESS NOTES
"Subjective:       Patient ID: Daphne Sullivan is a 30 y.o. female.    Vitals:  height is 5' 1" (1.549 m) and weight is 56.7 kg (125 lb). Her temperature is 97.6 °F (36.4 °C). Her blood pressure is 116/78 and her pulse is 64. Her respiration is 18 and oxygen saturation is 98%.     Chief Complaint: COVID-19 Concerns    30 year old female presents in clinic for COVID testing.  She is asymptomatic.  Exposed to COVID..      Constitution: Negative for appetite change, chills, sweating, fatigue, fever and international travel in last 60 days.   HENT: Negative for ear pain, tinnitus, facial swelling, congestion, sinus pain, sinus pressure, sore throat, trouble swallowing and voice change.    Neck: Negative for neck pain, neck stiffness and painful lymph nodes.   Cardiovascular: Negative for chest trauma, chest pain, leg swelling, palpitations and passing out.   Eyes: Negative for eye discharge, eye pain, eye redness, photophobia, vision loss, double vision and blurred vision.   Respiratory: Negative for sleep apnea, chest tightness, cough, sputum production, bloody sputum, COPD, shortness of breath, stridor, wheezing and asthma.    Gastrointestinal: Negative for abdominal trauma, abdominal pain, abdominal bloating, history of abdominal surgery, nausea, vomiting, constipation, diarrhea, dark colored stools, heartburn and bowel incontinence.   Genitourinary: Negative for dysuria, frequency, urgency, urine decreased, bladder incontinence, hematuria, history of kidney stones, painful menstruation, irregular menstruation, missed menses, heavy menstrual bleeding, ovarian cysts, genital trauma, vaginal pain, vaginal discharge, vaginal bleeding, vaginal odor, painful intercourse, genital sore, painful ejaculation and pelvic pain.   Musculoskeletal: Negative for trauma, joint pain, joint swelling, back pain, muscle cramps, muscle ache and history of spine disorder.   Skin: Negative for color change, pale, rash, wound, lesion " and bruising.   Allergic/Immunologic: Negative for seasonal allergies and asthma.   Neurological: Negative for dizziness, history of vertigo, light-headedness, passing out, facial drooping, speech difficulty, coordination disturbances, loss of balance, headaches, history of migraines, disorientation, loss of consciousness, numbness, tingling and seizures.   Hematologic/Lymphatic: Negative for swollen lymph nodes and history of blood clots.   Psychiatric/Behavioral: Negative for disorientation, confusion, nervous/anxious, sleep disturbance and depression. The patient is not nervous/anxious.        Objective:      Physical Exam   HENT:   Head: Normocephalic and atraumatic.   Nose: Nose normal.   Eyes: Pupils are equal, round, and reactive to light. Conjunctivae are normal. extraocular movement intact  Neck: Normal range of motion. Neck supple.   Cardiovascular: Normal rate, regular rhythm and normal pulses.   Pulmonary/Chest: Effort normal.   Abdominal: Normal appearance.   Neurological: She is alert. Psychiatric: Her behavior is normal. Mood, judgment and thought content normal.   Nursing note and vitals reviewed.        The patient location is: [facility]  The chief complaint leading to consultation is: EXPOSURE TO COVID AT DINNER PARTY ON FRIDAY    Visit type: audiovisual    Face to Face time with patient: 8  15 minutes of total time spent on the encounter, which includes face to face time and non-face to face time preparing to see the patient (eg, review of tests), Obtaining and/or reviewing separately obtained history, Documenting clinical information in the electronic or other health record, Independently interpreting results (not separately reported) and communicating results to the patient/family/caregiver, or Care coordination (not separately reported).         Each patient to whom he or she provides medical services by telemedicine is:  (1) informed of the relationship between the physician and patient and the  respective role of any other health care provider with respect to management of the patient; and (2) notified that he or she may decline to receive medical services by telemedicine and may withdraw from such care at any time.    Notes: EVALUATED VIA TYTO   VSS, NORMAL SINUS RHYTHM  BREATH SOUNDS CLEAR  IN NAD, PATIENT AAO ABLE TO ANSWER ALL QUESTIONS APPROPRIATELY       Results for orders placed or performed in visit on 10/19/20   POCT COVID-19 Rapid Screening   Result Value Ref Range    POC Rapid COVID Negative Negative     Acceptable Yes        Assessment:       1. Screening examination for unspecified infectious disease    2. Close exposure to COVID-19 virus        Plan:         Screening examination for unspecified infectious disease  -     POCT COVID-19 Rapid Screening    Close exposure to COVID-19 virus            Patient Instructions   It is recommended per CDC guidelines that you quarantine a minimum of 14 days if you have close contact with a positive exposure which is defined as less than 6 feet away for more than 15 minutes without wearing your mask.     You must understand that you've received an Urgent Care treatment only and that you may be released before all your medical problems are known or treated. You, the patient, will arrange for follow up care as instructed.  If your condition worsens we recommend that you receive another evaluation at the emergency room immediately or contact your primary medical clinics after hours call service to discuss your concerns.  Please return here or go to the Emergency Department for any concerns or worsening of condition.      Guidelines for General Prevention of COVID-19    o Take steps to protect yourself from COVID-19. Perform hand hygiene frequently. Wash your hands often with soap and water for at least 20 seconds of use and alcohol-based hand , covering all surfaces of your hands and rubbing them together until they feel dry.  o Avoid  touching your eyes, nose, and mouth with unwashed hands.  o Avoid close contact with people and stay home if youre sick, except to get medical care.   o Cover coughs and sneezes with a tissue, or use the inside of your elbow. Immediately wash your hands or use hand .     For more information, see CDC link below:    https://www.cdc.gov/coronavirus/2019-ncov/hcp/guidance-prevent-spread.html#precautions

## 2020-10-19 NOTE — PATIENT INSTRUCTIONS
It is recommended per CDC guidelines that you quarantine a minimum of 14 days if you have close contact with a positive exposure which is defined as less than 6 feet away for more than 15 minutes without wearing your mask.     You must understand that you've received an Urgent Care treatment only and that you may be released before all your medical problems are known or treated. You, the patient, will arrange for follow up care as instructed.  If your condition worsens we recommend that you receive another evaluation at the emergency room immediately or contact your primary medical clinics after hours call service to discuss your concerns.  Please return here or go to the Emergency Department for any concerns or worsening of condition.      Guidelines for General Prevention of COVID-19    o Take steps to protect yourself from COVID-19. Perform hand hygiene frequently. Wash your hands often with soap and water for at least 20 seconds of use and alcohol-based hand , covering all surfaces of your hands and rubbing them together until they feel dry.  o Avoid touching your eyes, nose, and mouth with unwashed hands.  o Avoid close contact with people and stay home if youre sick, except to get medical care.   o Cover coughs and sneezes with a tissue, or use the inside of your elbow. Immediately wash your hands or use hand .     For more information, see CDC link below:    https://www.cdc.gov/coronavirus/2019-ncov/hcp/guidance-prevent-spread.html#precautions

## 2020-11-30 ENCOUNTER — OFFICE VISIT (OUTPATIENT)
Dept: URGENT CARE | Facility: CLINIC | Age: 30
End: 2020-11-30
Payer: COMMERCIAL

## 2020-11-30 VITALS
DIASTOLIC BLOOD PRESSURE: 60 MMHG | HEART RATE: 62 BPM | HEIGHT: 61 IN | WEIGHT: 125 LBS | BODY MASS INDEX: 23.6 KG/M2 | RESPIRATION RATE: 18 BRPM | TEMPERATURE: 98 F | OXYGEN SATURATION: 98 % | SYSTOLIC BLOOD PRESSURE: 126 MMHG

## 2020-11-30 DIAGNOSIS — Z20.822 COVID-19 RULED OUT: Primary | ICD-10-CM

## 2020-11-30 DIAGNOSIS — J06.9 VIRAL URI: ICD-10-CM

## 2020-11-30 LAB
CTP QC/QA: YES
SARS-COV-2 RDRP RESP QL NAA+PROBE: NEGATIVE

## 2020-11-30 PROCEDURE — 99214 OFFICE O/P EST MOD 30 MIN: CPT | Mod: S$GLB,CS,, | Performed by: FAMILY MEDICINE

## 2020-11-30 PROCEDURE — U0002: ICD-10-PCS | Mod: QW,S$GLB,, | Performed by: FAMILY MEDICINE

## 2020-11-30 PROCEDURE — 99214 PR OFFICE/OUTPT VISIT, EST, LEVL IV, 30-39 MIN: ICD-10-PCS | Mod: S$GLB,CS,, | Performed by: FAMILY MEDICINE

## 2020-11-30 PROCEDURE — 3008F BODY MASS INDEX DOCD: CPT | Mod: CPTII,S$GLB,, | Performed by: FAMILY MEDICINE

## 2020-11-30 PROCEDURE — U0002 COVID-19 LAB TEST NON-CDC: HCPCS | Mod: QW,S$GLB,, | Performed by: FAMILY MEDICINE

## 2020-11-30 PROCEDURE — 3008F PR BODY MASS INDEX (BMI) DOCUMENTED: ICD-10-PCS | Mod: CPTII,S$GLB,, | Performed by: FAMILY MEDICINE

## 2020-12-01 NOTE — PROGRESS NOTES
"Subjective:       Patient ID: Daphne Sullivan is a 30 y.o. female.    Vitals:  height is 5' 1" (1.549 m) and weight is 56.7 kg (125 lb). Her temperature is 98.3 °F (36.8 °C). Her blood pressure is 126/60 and her pulse is 62. Her respiration is 18 and oxygen saturation is 98%.     Chief Complaint: URI    30 year old female c/o ear pressure, ear pain, post nasal drip, burning in chest, headache, and sinus pressure that started a week ago.     URI   This is a new problem. The current episode started in the past 7 days. The problem has been gradually worsening. There has been no fever. Associated symptoms include ear pain, headaches, a plugged ear sensation and sinus pain. Pertinent negatives include no abdominal pain, chest pain, congestion, coughing, diarrhea, dysuria, joint pain, joint swelling, nausea, neck pain, rash, rhinorrhea, sneezing, sore throat, swollen glands, vomiting or wheezing. She has tried nothing for the symptoms.       Constitution: Negative for chills, sweating, fatigue and fever.   HENT: Positive for ear pain, postnasal drip, sinus pain and sinus pressure. Negative for congestion, sore throat and voice change.    Neck: Negative for neck pain and painful lymph nodes.   Cardiovascular: Negative for chest pain and leg swelling.   Eyes: Negative for eye redness, double vision and blurred vision.   Respiratory: Negative for chest tightness, cough, sputum production, bloody sputum, COPD, shortness of breath, stridor, wheezing and asthma.    Gastrointestinal: Negative for abdominal pain, nausea, vomiting and diarrhea.   Genitourinary: Negative for dysuria, frequency, urgency and history of kidney stones.   Musculoskeletal: Negative for joint pain, joint swelling, muscle cramps and muscle ache.   Skin: Negative for color change, pale, rash and bruising.   Allergic/Immunologic: Negative for seasonal allergies, asthma and sneezing.   Neurological: Positive for headaches. Negative for dizziness, " history of vertigo, light-headedness and passing out.   Hematologic/Lymphatic: Negative for swollen lymph nodes.   Psychiatric/Behavioral: Negative for nervous/anxious, sleep disturbance and depression. The patient is not nervous/anxious.        Objective:      Physical Exam   Constitutional: She is oriented to person, place, and time. She appears well-developed. She is cooperative.  Non-toxic appearance. She does not appear ill. No distress.   HENT:   Head: Normocephalic and atraumatic.   Ears:   Right Ear: Hearing, tympanic membrane, external ear and ear canal normal.   Left Ear: Hearing, tympanic membrane, external ear and ear canal normal.   Nose: Nose normal. No mucosal edema, rhinorrhea or nasal deformity. No epistaxis. Right sinus exhibits no maxillary sinus tenderness and no frontal sinus tenderness. Left sinus exhibits no maxillary sinus tenderness and no frontal sinus tenderness.   Mouth/Throat: Uvula is midline, oropharynx is clear and moist and mucous membranes are normal. No trismus in the jaw. Normal dentition. No uvula swelling. No oropharyngeal exudate, posterior oropharyngeal edema or posterior oropharyngeal erythema.   Eyes: Conjunctivae and lids are normal. No scleral icterus.   Neck: Trachea normal, full passive range of motion without pain and phonation normal. Neck supple. No neck rigidity. No edema and no erythema present.   Cardiovascular: Normal rate, regular rhythm, normal heart sounds and normal pulses.   Pulmonary/Chest: Effort normal and breath sounds normal. No respiratory distress. She has no decreased breath sounds. She has no rhonchi.   Abdominal: Normal appearance.   Musculoskeletal: Normal range of motion.         General: No deformity.   Neurological: She is alert and oriented to person, place, and time. She exhibits normal muscle tone. Coordination normal.   Skin: Skin is warm, dry, intact, not diaphoretic and not pale. Psychiatric: Her speech is normal and behavior is normal.  Judgment and thought content normal.   Nursing note and vitals reviewed.        Assessment:       1. COVID-19 ruled out    2. Viral URI        Plan:         COVID-19 ruled out  -     POCT COVID-19 Rapid Screening    Viral URI      Patient Instructions     Instructions for Patients with Confirmed or Suspected COVID-19    If you are awaiting your test result, you will either be called or it will be released to the patient portal.  If you have any questions about your test, please visit [email or web address] or call our COVID-19 information line at [phone].      Stay home and stay away from family members and friends. You may leave home and/or return to work when the following conditions are met:   24 hours fever free without fever-reducing medications AND   Improved symptoms     Separate yourself from other people and animals in your home.   Call ahead before visiting your doctor.   Wear a facemask.   Cover your coughs and sneezes.   Wash your hands often with soap and water; hand  can be used, too.   Avoid sharing personal household items.   Wipe down surfaces used daily.   Monitor your symptoms. Seek prompt medical attention if your illness is worsening (e.g., difficulty breathing).    Before seeking care, call your healthcare provider.   If you have a medical emergency and need to call 911, notify the dispatch personnel that you have, or are being evaluated for COVID-19. If possible, put on a facemask before emergency medical services arrive.        Recommended precautions for household members, intimate partners, and caregivers in a home setting of a patient with symptomatic laboratory-confirmed COVID-19 or a patient under investigation.  Household members, intimate partners, and caregivers in the home setting awaiting tests results have close contact with a person with symptomatic, laboratory-confirmed COVID-19 or a person under investigation. Close contacts should monitor their  health; they should call their provider right away if they develop symptoms suggestive of COVID-19 (e.g., fever, cough, shortness of breath).    Close contacts should also follow these recommendations:   Make sure that you understand and can help the patient follow their provider's instructions for medication(s) and care. You should help the patient with basic needs in the home and provide support for getting groceries, prescriptions, and other personal needs.   Monitor the patient's symptoms. If the patient is getting sicker, call his or her healthcare provider and tell them that the patient has laboratory-confirmed COVID-19. If the patient has a medical emergency and you need to call 911, notify the dispatch personnel that the patient has, or is being evaluated for COVID-19.   Household members should stay in another room or be  from the patient. Household members should use a separate bedroom and bathroom, if available.   Prohibit visitors.   Household members should care for any pets in the home.   Make sure that shared spaces in the home have good air flow, such as by an air conditioner or an opened window, weather permitting.   Perform hand hygiene frequently. Wash your hands often with soap and water for at least 20 seconds or use an alcohol-based hand  (that contains > 60% alcohol) covering all surfaces of your hands and rubbing them together until they feel dry. Soap and water should be used preferentially.   Avoid touching your eyes, nose, and mouth.   The patient should wear a facemask. If the patient is not able to wear a facemask (for example, because it causes trouble breathing), caregivers should wear a mask when they are in the same room as the patient.   Wear a disposable facemask and gloves when you touch or have contact with the patient's blood, stool, or body fluids, such as saliva, sputum, nasal mucus, vomit, urine.  o Throw out disposable facemasks and gloves after  using them. Do not reuse.  o When removing personal protective equipment, first remove and dispose of gloves. Then, immediately clean your hands with soap and water or alcohol-based hand . Next, remove and dispose of facemask, and immediately clean your hands again with soap and water or alcohol-based hand .   You should not share dishes, drinking glasses, cups, eating utensils, towels, bedding, or other items with the patient. After the patient uses these items, you should wash them thoroughly (see below Wash laundry thoroughly).   Clean all high-touch surfaces, such as counters, tabletops, doorknobs, bathroom fixtures, toilets, phones, keyboards, tablets, and bedside tables, every day. Also, clean any surfaces that may have blood, stool, or body fluids on them.   Use a household cleaning spray or wipe, according to the label instructions. Labels contain instructions for safe and effective use of the cleaning product including precautions you should take when applying the product, such as wearing gloves and making sure you have good ventilation during use of the product.   Wash laundry thoroughly.  o Immediately remove and wash clothes or bedding that have blood, stool, or body fluids on them.  o Wear disposable gloves while handling soiled items and keep soiled items away from your body. Clean your hands (with soap and water or an alcohol-based hand ) immediately after removing your gloves.  o Read and follow directions on labels of laundry or clothing items and detergent. In general, using a normal laundry detergent according to washing machine instructions and dry thoroughly using the warmest temperatures recommended on the clothing label.   Place all used disposable gloves, facemasks, and other contaminated items in a lined container before disposing of them with other household waste. Clean your hands (with soap and water or an alcohol-based hand ) immediately after  handling these items. Soap and water should be used preferentially if hands are visibly dirty.   Discuss any additional questions with your state or local health department or healthcare provider. Check available hours when contacting your local health department.    For more information see CDC link below.      https://www.cdc.gov/coronavirus/2019-ncov/hcp/guidance-prevent-spread.html#precautions        Sources:  [facility], [facility]      Your test was NEGATIVE for COVID-19 (coronavirus).        If your symptoms worsen or if you have any other concerns, please contact [facility] at [phone].     Sincerely,    Keira Bae, DO  Viral Upper Respiratory Illness (Adult)  You have a viral upper respiratory illness (URI), which is another term for the common cold. This illness is contagious during the first few days. It is spread through the air by coughing and sneezing. It may also be spread by direct contact (touching the sick person and then touching your own eyes, nose, or mouth). Frequent handwashing will decrease risk of spread. Most viral illnesses go away within 7 to 10 days with rest and simple home remedies. Sometimes the illness may last for several weeks. Antibiotics will not kill a virus, and they are generally not prescribed for this condition.    Home care  · If symptoms are severe, rest at home for the first 2 to 3 days. When you resume activity, don't let yourself get too tired.  · Avoid being exposed to cigarette smoke (yours or others).  · You may use acetaminophen or ibuprofen to control pain and fever, unless another medicine was prescribed. (Note: If you have chronic liver or kidney disease, have ever had a stomach ulcer or gastrointestinal bleeding, or are taking blood-thinning medicines, talk with your healthcare provider before using these medicines.) Aspirin should never be given to anyone under 18 years of age who is ill with a viral infection or fever.  It may cause severe liver or brain damage.  · Your appetite may be poor, so a light diet is fine. Avoid dehydration by drinking 6 to 8 glasses of fluids per day (water, soft drinks, juices, tea, or soup). Extra fluids will help loosen secretions in the nose and lungs.  · Over-the-counter cold medicines will not shorten the length of time youre sick, but they may be helpful for the following symptoms: cough, sore throat, and nasal and sinus congestion. (Note: Do not use decongestants if you have high blood pressure.)  Follow-up care  Follow up with your healthcare provider, or as advised.  When to seek medical advice  Call your healthcare provider right away if any of these occur:  · Cough with lots of colored sputum (mucus)  · Severe headache; face, neck, or ear pain  · Difficulty swallowing due to throat pain  · Fever of 100.4°F (38°C)  Call 911, or get immediate medical care  Call emergency services right away if any of these occur:  · Chest pain, shortness of breath, wheezing, or difficulty breathing  · Coughing up blood  · Inability to swallow due to throat pain  Date Last Reviewed: 9/13/2015  © 2294-6562 [employer]. [address] [place] [address], [place] [address]. All rights reserved. This information is not intended as a substitute for professional medical care. Always follow your healthcare professional's instructions.

## 2020-12-01 NOTE — PATIENT INSTRUCTIONS
Instructions for Patients with Confirmed or Suspected COVID-19    If you are awaiting your test result, you will either be called or it will be released to the patient portal.  If you have any questions about your test, please visit [email or web address] or call our COVID-19 information line at [phone].      Stay home and stay away from family members and friends. You may leave home and/or return to work when the following conditions are met:   24 hours fever free without fever-reducing medications AND   Improved symptoms     Separate yourself from other people and animals in your home.   Call ahead before visiting your doctor.   Wear a facemask.   Cover your coughs and sneezes.   Wash your hands often with soap and water; hand  can be used, too.   Avoid sharing personal household items.   Wipe down surfaces used daily.   Monitor your symptoms. Seek prompt medical attention if your illness is worsening (e.g., difficulty breathing).    Before seeking care, call your healthcare provider.   If you have a medical emergency and need to call 911, notify the dispatch personnel that you have, or are being evaluated for COVID-19. If possible, put on a facemask before emergency medical services arrive.        Recommended precautions for household members, intimate partners, and caregivers in a home setting of a patient with symptomatic laboratory-confirmed COVID-19 or a patient under investigation.  Household members, intimate partners, and caregivers in the home setting awaiting tests results have close contact with a person with symptomatic, laboratory-confirmed COVID-19 or a person under investigation. Close contacts should monitor their health; they should call their provider right away if they develop symptoms suggestive of COVID-19 (e.g., fever, cough, shortness of breath).    Close contacts should also follow these recommendations:   Make sure that you understand and can help the  patient follow their provider's instructions for medication(s) and care. You should help the patient with basic needs in the home and provide support for getting groceries, prescriptions, and other personal needs.   Monitor the patient's symptoms. If the patient is getting sicker, call his or her healthcare provider and tell them that the patient has laboratory-confirmed COVID-19. If the patient has a medical emergency and you need to call 911, notify the dispatch personnel that the patient has, or is being evaluated for COVID-19.   Household members should stay in another room or be  from the patient. Household members should use a separate bedroom and bathroom, if available.   Prohibit visitors.   Household members should care for any pets in the home.   Make sure that shared spaces in the home have good air flow, such as by an air conditioner or an opened window, weather permitting.   Perform hand hygiene frequently. Wash your hands often with soap and water for at least 20 seconds or use an alcohol-based hand  (that contains > 60% alcohol) covering all surfaces of your hands and rubbing them together until they feel dry. Soap and water should be used preferentially.   Avoid touching your eyes, nose, and mouth.   The patient should wear a facemask. If the patient is not able to wear a facemask (for example, because it causes trouble breathing), caregivers should wear a mask when they are in the same room as the patient.   Wear a disposable facemask and gloves when you touch or have contact with the patient's blood, stool, or body fluids, such as saliva, sputum, nasal mucus, vomit, urine.  o Throw out disposable facemasks and gloves after using them. Do not reuse.  o When removing personal protective equipment, first remove and dispose of gloves. Then, immediately clean your hands with soap and water or alcohol-based hand . Next, remove and dispose of facemask, and immediately  clean your hands again with soap and water or alcohol-based hand .   You should not share dishes, drinking glasses, cups, eating utensils, towels, bedding, or other items with the patient. After the patient uses these items, you should wash them thoroughly (see below Wash laundry thoroughly).   Clean all high-touch surfaces, such as counters, tabletops, doorknobs, bathroom fixtures, toilets, phones, keyboards, tablets, and bedside tables, every day. Also, clean any surfaces that may have blood, stool, or body fluids on them.   Use a household cleaning spray or wipe, according to the label instructions. Labels contain instructions for safe and effective use of the cleaning product including precautions you should take when applying the product, such as wearing gloves and making sure you have good ventilation during use of the product.   Wash laundry thoroughly.  o Immediately remove and wash clothes or bedding that have blood, stool, or body fluids on them.  o Wear disposable gloves while handling soiled items and keep soiled items away from your body. Clean your hands (with soap and water or an alcohol-based hand ) immediately after removing your gloves.  o Read and follow directions on labels of laundry or clothing items and detergent. In general, using a normal laundry detergent according to washing machine instructions and dry thoroughly using the warmest temperatures recommended on the clothing label.   Place all used disposable gloves, facemasks, and other contaminated items in a lined container before disposing of them with other household waste. Clean your hands (with soap and water or an alcohol-based hand ) immediately after handling these items. Soap and water should be used preferentially if hands are visibly dirty.   Discuss any additional questions with your state or local health department or healthcare provider. Check available hours when contacting your local  health department.    For more information see CDC link below.      https://www.cdc.gov/coronavirus/2019-ncov/hcp/guidance-prevent-spread.html#precautions        Sources:  [facility][note 3], [place] [facility] and [facility]      Your test was NEGATIVE for COVID-19 (coronavirus).        If your symptoms worsen or if you have any other concerns, please contact [facility] at [phone].     Sincerely,    Keira Bae, DO  Viral Upper Respiratory Illness (Adult)  You have a viral upper respiratory illness (URI), which is another term for the common cold. This illness is contagious during the first few days. It is spread through the air by coughing and sneezing. It may also be spread by direct contact (touching the sick person and then touching your own eyes, nose, or mouth). Frequent handwashing will decrease risk of spread. Most viral illnesses go away within 7 to 10 days with rest and simple home remedies. Sometimes the illness may last for several weeks. Antibiotics will not kill a virus, and they are generally not prescribed for this condition.    Home care  · If symptoms are severe, rest at home for the first 2 to 3 days. When you resume activity, don't let yourself get too tired.  · Avoid being exposed to cigarette smoke (yours or others).  · You may use acetaminophen or ibuprofen to control pain and fever, unless another medicine was prescribed. (Note: If you have chronic liver or kidney disease, have ever had a stomach ulcer or gastrointestinal bleeding, or are taking blood-thinning medicines, talk with your healthcare provider before using these medicines.) Aspirin should never be given to anyone under 18 years of age who is ill with a viral infection or fever. It may cause severe liver or brain damage.  · Your appetite may be poor, so a light diet is fine. Avoid dehydration by drinking 6 to 8 glasses of fluids per day (water, soft drinks, juices, tea, or soup). Extra fluids will help loosen secretions in  the nose and lungs.  · Over-the-counter cold medicines will not shorten the length of time youre sick, but they may be helpful for the following symptoms: cough, sore throat, and nasal and sinus congestion. (Note: Do not use decongestants if you have high blood pressure.)  Follow-up care  Follow up with your healthcare provider, or as advised.  When to seek medical advice  Call your healthcare provider right away if any of these occur:  · Cough with lots of colored sputum (mucus)  · Severe headache; face, neck, or ear pain  · Difficulty swallowing due to throat pain  · Fever of 100.4°F (38°C)  Call 911, or get immediate medical care  Call emergency services right away if any of these occur:  · Chest pain, shortness of breath, wheezing, or difficulty breathing  · Coughing up blood  · Inability to swallow due to throat pain  Date Last Reviewed: 9/13/2015  © 4687-1957 [employer], [employer]. [address] [place] [address], [place] [address]. All rights reserved. This information is not intended as a substitute for professional medical care. Always follow your healthcare professional's instructions.

## 2021-01-04 ENCOUNTER — OFFICE VISIT (OUTPATIENT)
Dept: INTERNAL MEDICINE | Facility: CLINIC | Age: 31
End: 2021-01-04
Payer: COMMERCIAL

## 2021-01-04 ENCOUNTER — TELEPHONE (OUTPATIENT)
Dept: INTERNAL MEDICINE | Facility: CLINIC | Age: 31
End: 2021-01-04

## 2021-01-04 ENCOUNTER — HOSPITAL ENCOUNTER (OUTPATIENT)
Dept: RADIOLOGY | Facility: OTHER | Age: 31
Discharge: HOME | End: 2021-01-04
Attending: PHYSICIAN ASSISTANT
Payer: COMMERCIAL

## 2021-01-04 VITALS
SYSTOLIC BLOOD PRESSURE: 100 MMHG | HEIGHT: 61 IN | WEIGHT: 127 LBS | OXYGEN SATURATION: 99 % | DIASTOLIC BLOOD PRESSURE: 70 MMHG | BODY MASS INDEX: 23.98 KG/M2 | HEART RATE: 52 BPM

## 2021-01-04 DIAGNOSIS — S96.911A STRAIN OF RIGHT ANKLE, INITIAL ENCOUNTER: ICD-10-CM

## 2021-01-04 DIAGNOSIS — S96.911A STRAIN OF RIGHT ANKLE, INITIAL ENCOUNTER: Primary | ICD-10-CM

## 2021-01-04 DIAGNOSIS — M25.571 RIGHT ANKLE PAIN, UNSPECIFIED CHRONICITY: Primary | ICD-10-CM

## 2021-01-04 PROCEDURE — 1125F PR PAIN SEVERITY QUANTIFIED, PAIN PRESENT: ICD-10-PCS | Mod: S$GLB,,, | Performed by: PHYSICIAN ASSISTANT

## 2021-01-04 PROCEDURE — 99212 PR OFFICE/OUTPT VISIT, EST, LEVL II, 10-19 MIN: ICD-10-PCS | Mod: S$GLB,,, | Performed by: PHYSICIAN ASSISTANT

## 2021-01-04 PROCEDURE — 99999 PR PBB SHADOW E&M-EST. PATIENT-LVL III: CPT | Mod: PBBFAC,,, | Performed by: PHYSICIAN ASSISTANT

## 2021-01-04 PROCEDURE — 1125F AMNT PAIN NOTED PAIN PRSNT: CPT | Mod: S$GLB,,, | Performed by: PHYSICIAN ASSISTANT

## 2021-01-04 PROCEDURE — 99999 PR PBB SHADOW E&M-EST. PATIENT-LVL III: ICD-10-PCS | Mod: PBBFAC,,, | Performed by: PHYSICIAN ASSISTANT

## 2021-01-04 PROCEDURE — 99212 OFFICE O/P EST SF 10 MIN: CPT | Mod: S$GLB,,, | Performed by: PHYSICIAN ASSISTANT

## 2021-01-04 PROCEDURE — 3008F BODY MASS INDEX DOCD: CPT | Mod: CPTII,S$GLB,, | Performed by: PHYSICIAN ASSISTANT

## 2021-01-04 PROCEDURE — 73610 X-RAY EXAM OF ANKLE: CPT | Mod: TC,FY,RT

## 2021-01-04 PROCEDURE — 73610 XR ANKLE COMPLETE 3 VIEW RIGHT: ICD-10-PCS | Mod: 26,RT,, | Performed by: RADIOLOGY

## 2021-01-04 PROCEDURE — 73610 X-RAY EXAM OF ANKLE: CPT | Mod: 26,RT,, | Performed by: RADIOLOGY

## 2021-01-04 PROCEDURE — 3008F PR BODY MASS INDEX (BMI) DOCUMENTED: ICD-10-PCS | Mod: CPTII,S$GLB,, | Performed by: PHYSICIAN ASSISTANT

## 2021-01-05 ENCOUNTER — TELEPHONE (OUTPATIENT)
Dept: INTERNAL MEDICINE | Facility: CLINIC | Age: 31
End: 2021-01-05

## 2021-01-06 ENCOUNTER — TELEPHONE (OUTPATIENT)
Dept: SPORTS MEDICINE | Facility: CLINIC | Age: 31
End: 2021-01-06

## 2021-01-06 ENCOUNTER — PATIENT OUTREACH (OUTPATIENT)
Dept: ADMINISTRATIVE | Facility: OTHER | Age: 31
End: 2021-01-06

## 2021-01-06 DIAGNOSIS — M25.571 ACUTE RIGHT ANKLE PAIN: Primary | ICD-10-CM

## 2021-01-09 ENCOUNTER — HOSPITAL ENCOUNTER (OUTPATIENT)
Dept: RADIOLOGY | Facility: HOSPITAL | Age: 31
Discharge: HOME | End: 2021-01-09
Attending: PHYSICIAN ASSISTANT
Payer: COMMERCIAL

## 2021-01-09 DIAGNOSIS — M25.571 ACUTE RIGHT ANKLE PAIN: ICD-10-CM

## 2021-01-09 PROCEDURE — 73700 CT LOWER EXTREMITY W/O DYE: CPT | Mod: 26,RT,, | Performed by: RADIOLOGY

## 2021-01-09 PROCEDURE — 73700 CT LOWER EXTREMITY W/O DYE: CPT | Mod: TC,RT

## 2021-01-09 PROCEDURE — 73700 CT ANKLE (INCLUDING HINDFOOT) WITHOUT CONTRAST RIGHT: ICD-10-PCS | Mod: 26,RT,, | Performed by: RADIOLOGY

## 2021-01-12 ENCOUNTER — OFFICE VISIT (OUTPATIENT)
Dept: SPORTS MEDICINE | Facility: CLINIC | Age: 31
End: 2021-01-12
Payer: COMMERCIAL

## 2021-01-12 VITALS
BODY MASS INDEX: 23.96 KG/M2 | HEART RATE: 53 BPM | WEIGHT: 130.19 LBS | SYSTOLIC BLOOD PRESSURE: 117 MMHG | HEIGHT: 62 IN | DIASTOLIC BLOOD PRESSURE: 73 MMHG

## 2021-01-12 DIAGNOSIS — S96.911A STRAIN OF RIGHT ANKLE, INITIAL ENCOUNTER: ICD-10-CM

## 2021-01-12 PROCEDURE — 99203 OFFICE O/P NEW LOW 30 MIN: CPT | Mod: S$GLB,,, | Performed by: PHYSICIAN ASSISTANT

## 2021-01-12 PROCEDURE — 3008F PR BODY MASS INDEX (BMI) DOCUMENTED: ICD-10-PCS | Mod: CPTII,S$GLB,, | Performed by: PHYSICIAN ASSISTANT

## 2021-01-12 PROCEDURE — 99999 PR PBB SHADOW E&M-EST. PATIENT-LVL III: ICD-10-PCS | Mod: PBBFAC,,, | Performed by: PHYSICIAN ASSISTANT

## 2021-01-12 PROCEDURE — 1126F AMNT PAIN NOTED NONE PRSNT: CPT | Mod: S$GLB,,, | Performed by: PHYSICIAN ASSISTANT

## 2021-01-12 PROCEDURE — 99203 PR OFFICE/OUTPT VISIT, NEW, LEVL III, 30-44 MIN: ICD-10-PCS | Mod: S$GLB,,, | Performed by: PHYSICIAN ASSISTANT

## 2021-01-12 PROCEDURE — 3008F BODY MASS INDEX DOCD: CPT | Mod: CPTII,S$GLB,, | Performed by: PHYSICIAN ASSISTANT

## 2021-01-12 PROCEDURE — 1126F PR PAIN SEVERITY QUANTIFIED, NO PAIN PRESENT: ICD-10-PCS | Mod: S$GLB,,, | Performed by: PHYSICIAN ASSISTANT

## 2021-01-12 PROCEDURE — 99999 PR PBB SHADOW E&M-EST. PATIENT-LVL III: CPT | Mod: PBBFAC,,, | Performed by: PHYSICIAN ASSISTANT

## 2021-02-25 ENCOUNTER — PATIENT MESSAGE (OUTPATIENT)
Dept: SPORTS MEDICINE | Facility: CLINIC | Age: 31
End: 2021-02-25

## 2021-02-25 ENCOUNTER — TELEPHONE (OUTPATIENT)
Dept: SPORTS MEDICINE | Facility: CLINIC | Age: 31
End: 2021-02-25

## 2021-02-25 DIAGNOSIS — M25.571 ACUTE RIGHT ANKLE PAIN: ICD-10-CM

## 2021-02-25 DIAGNOSIS — S96.911A STRAIN OF RIGHT ANKLE, INITIAL ENCOUNTER: Primary | ICD-10-CM

## 2021-03-03 ENCOUNTER — CLINICAL SUPPORT (OUTPATIENT)
Dept: REHABILITATION | Facility: OTHER | Age: 31
End: 2021-03-03
Payer: COMMERCIAL

## 2021-03-03 DIAGNOSIS — M25.571 ACUTE RIGHT ANKLE PAIN: ICD-10-CM

## 2021-03-03 DIAGNOSIS — Z74.09 IMPAIRED FUNCTIONAL MOBILITY, BALANCE, GAIT, AND ENDURANCE: ICD-10-CM

## 2021-03-03 DIAGNOSIS — S96.911A STRAIN OF RIGHT ANKLE, INITIAL ENCOUNTER: ICD-10-CM

## 2021-03-03 DIAGNOSIS — M62.81 MUSCLE WEAKNESS: ICD-10-CM

## 2021-03-03 PROCEDURE — 97110 THERAPEUTIC EXERCISES: CPT | Mod: PN | Performed by: PHYSICAL THERAPIST

## 2021-03-03 PROCEDURE — 97161 PT EVAL LOW COMPLEX 20 MIN: CPT | Mod: PN | Performed by: PHYSICAL THERAPIST

## 2021-03-12 ENCOUNTER — CLINICAL SUPPORT (OUTPATIENT)
Dept: REHABILITATION | Facility: OTHER | Age: 31
End: 2021-03-12
Payer: COMMERCIAL

## 2021-03-12 DIAGNOSIS — Z74.09 IMPAIRED FUNCTIONAL MOBILITY, BALANCE, GAIT, AND ENDURANCE: ICD-10-CM

## 2021-03-12 DIAGNOSIS — M25.571 ACUTE RIGHT ANKLE PAIN: ICD-10-CM

## 2021-03-12 DIAGNOSIS — M62.81 MUSCLE WEAKNESS: ICD-10-CM

## 2021-03-12 PROCEDURE — 97110 THERAPEUTIC EXERCISES: CPT | Mod: PN | Performed by: PHYSICAL THERAPIST

## 2021-04-03 ENCOUNTER — PATIENT MESSAGE (OUTPATIENT)
Dept: OBSTETRICS AND GYNECOLOGY | Facility: CLINIC | Age: 31
End: 2021-04-03

## 2021-04-08 ENCOUNTER — PATIENT MESSAGE (OUTPATIENT)
Dept: REHABILITATION | Facility: OTHER | Age: 31
End: 2021-04-08

## 2021-04-16 ENCOUNTER — PATIENT MESSAGE (OUTPATIENT)
Dept: RESEARCH | Facility: HOSPITAL | Age: 31
End: 2021-04-16

## 2021-04-20 ENCOUNTER — PATIENT MESSAGE (OUTPATIENT)
Dept: REHABILITATION | Facility: OTHER | Age: 31
End: 2021-04-20

## 2021-04-30 ENCOUNTER — PATIENT MESSAGE (OUTPATIENT)
Dept: REHABILITATION | Facility: OTHER | Age: 31
End: 2021-04-30

## 2021-05-03 ENCOUNTER — DOCUMENTATION ONLY (OUTPATIENT)
Dept: REHABILITATION | Facility: OTHER | Age: 31
End: 2021-05-03

## 2021-05-03 PROBLEM — M25.571 ACUTE RIGHT ANKLE PAIN: Status: RESOLVED | Noted: 2021-03-03 | Resolved: 2021-05-03

## 2021-05-03 PROBLEM — M62.81 MUSCLE WEAKNESS: Status: RESOLVED | Noted: 2021-03-03 | Resolved: 2021-05-03

## 2021-05-03 PROBLEM — Z74.09 IMPAIRED FUNCTIONAL MOBILITY, BALANCE, GAIT, AND ENDURANCE: Status: RESOLVED | Noted: 2021-03-03 | Resolved: 2021-05-03

## 2021-07-26 ENCOUNTER — OFFICE VISIT (OUTPATIENT)
Dept: OBSTETRICS AND GYNECOLOGY | Facility: CLINIC | Age: 31
End: 2021-07-26
Attending: OBSTETRICS & GYNECOLOGY
Payer: COMMERCIAL

## 2021-07-26 VITALS
BODY MASS INDEX: 22.98 KG/M2 | WEIGHT: 125.69 LBS | DIASTOLIC BLOOD PRESSURE: 74 MMHG | SYSTOLIC BLOOD PRESSURE: 122 MMHG

## 2021-07-26 DIAGNOSIS — Z30.8 ENCOUNTER FOR OTHER CONTRACEPTIVE MANAGEMENT: ICD-10-CM

## 2021-07-26 DIAGNOSIS — Z01.419 WELL WOMAN EXAM: Primary | ICD-10-CM

## 2021-07-26 DIAGNOSIS — Z01.419 ENCOUNTER FOR CERVICAL PAP SMEAR WITH PELVIC EXAM: ICD-10-CM

## 2021-07-26 DIAGNOSIS — Z30.09 ENCOUNTER FOR COUNSELING REGARDING CONTRACEPTION: ICD-10-CM

## 2021-07-26 LAB
B-HCG UR QL: NEGATIVE
CTP QC/QA: YES

## 2021-07-26 PROCEDURE — 99395 PR PREVENTIVE VISIT,EST,18-39: ICD-10-PCS | Mod: S$GLB,,, | Performed by: PHYSICIAN ASSISTANT

## 2021-07-26 PROCEDURE — 3008F BODY MASS INDEX DOCD: CPT | Mod: CPTII,S$GLB,, | Performed by: PHYSICIAN ASSISTANT

## 2021-07-26 PROCEDURE — 88142 CYTOPATH C/V THIN LAYER: CPT | Performed by: PHYSICIAN ASSISTANT

## 2021-07-26 PROCEDURE — 1159F MED LIST DOCD IN RCRD: CPT | Mod: CPTII,S$GLB,, | Performed by: PHYSICIAN ASSISTANT

## 2021-07-26 PROCEDURE — 81025 URINE PREGNANCY TEST: CPT | Mod: S$GLB,,, | Performed by: PHYSICIAN ASSISTANT

## 2021-07-26 PROCEDURE — 1160F PR REVIEW ALL MEDS BY PRESCRIBER/CLIN PHARMACIST DOCUMENTED: ICD-10-PCS | Mod: CPTII,S$GLB,, | Performed by: PHYSICIAN ASSISTANT

## 2021-07-26 PROCEDURE — 87624 HPV HI-RISK TYP POOLED RSLT: CPT | Performed by: PHYSICIAN ASSISTANT

## 2021-07-26 PROCEDURE — 3008F PR BODY MASS INDEX (BMI) DOCUMENTED: ICD-10-PCS | Mod: CPTII,S$GLB,, | Performed by: PHYSICIAN ASSISTANT

## 2021-07-26 PROCEDURE — 99999 PR PBB SHADOW E&M-EST. PATIENT-LVL II: ICD-10-PCS | Mod: PBBFAC,,, | Performed by: PHYSICIAN ASSISTANT

## 2021-07-26 PROCEDURE — 99999 PR PBB SHADOW E&M-EST. PATIENT-LVL II: CPT | Mod: PBBFAC,,, | Performed by: PHYSICIAN ASSISTANT

## 2021-07-26 PROCEDURE — 1159F PR MEDICATION LIST DOCUMENTED IN MEDICAL RECORD: ICD-10-PCS | Mod: CPTII,S$GLB,, | Performed by: PHYSICIAN ASSISTANT

## 2021-07-26 PROCEDURE — 1126F PR PAIN SEVERITY QUANTIFIED, NO PAIN PRESENT: ICD-10-PCS | Mod: CPTII,S$GLB,, | Performed by: PHYSICIAN ASSISTANT

## 2021-07-26 PROCEDURE — 99395 PREV VISIT EST AGE 18-39: CPT | Mod: S$GLB,,, | Performed by: PHYSICIAN ASSISTANT

## 2021-07-26 PROCEDURE — 81025 POCT URINE PREGNANCY: ICD-10-PCS | Mod: S$GLB,,, | Performed by: PHYSICIAN ASSISTANT

## 2021-07-26 PROCEDURE — 1160F RVW MEDS BY RX/DR IN RCRD: CPT | Mod: CPTII,S$GLB,, | Performed by: PHYSICIAN ASSISTANT

## 2021-07-26 PROCEDURE — 1126F AMNT PAIN NOTED NONE PRSNT: CPT | Mod: CPTII,S$GLB,, | Performed by: PHYSICIAN ASSISTANT

## 2021-07-26 RX ORDER — NORETHINDRONE ACETATE AND ETHINYL ESTRADIOL 1MG-20(21)
1 KIT ORAL DAILY
Qty: 28 TABLET | Refills: 11 | Status: SHIPPED | OUTPATIENT
Start: 2021-07-26 | End: 2021-09-29

## 2021-08-02 LAB
FINAL PATHOLOGIC DIAGNOSIS: NORMAL
Lab: NORMAL

## 2021-08-04 LAB
HPV HR 12 DNA SPEC QL NAA+PROBE: NEGATIVE
HPV16 AG SPEC QL: NEGATIVE
HPV18 DNA SPEC QL NAA+PROBE: NEGATIVE

## 2021-10-11 ENCOUNTER — TELEPHONE (OUTPATIENT)
Dept: OBSTETRICS AND GYNECOLOGY | Facility: CLINIC | Age: 31
End: 2021-10-11

## 2021-10-13 ENCOUNTER — PATIENT OUTREACH (OUTPATIENT)
Dept: ADMINISTRATIVE | Facility: OTHER | Age: 31
End: 2021-10-13

## 2022-02-15 DIAGNOSIS — F41.8 OTHER SPECIFIED ANXIETY DISORDERS: Primary | ICD-10-CM

## 2022-02-15 RX ORDER — ALPRAZOLAM 0.5 MG/1
0.5 TABLET ORAL 3 TIMES DAILY PRN
Qty: 20 TABLET | Refills: 0 | OUTPATIENT
Start: 2022-02-15 | End: 2022-03-17

## 2022-02-15 NOTE — TELEPHONE ENCOUNTER
No new care gaps identified.  Powered by [employer]. Reference number: [id number].   2/15/2022 9:51:59 AM CST

## 2022-02-25 ENCOUNTER — TELEPHONE (OUTPATIENT)
Dept: INTERNAL MEDICINE | Facility: CLINIC | Age: 32
End: 2022-02-25
Payer: COMMERCIAL

## 2022-02-25 NOTE — TELEPHONE ENCOUNTER
Called pt to reschedule appt, no answer. Left vm and sent a portal message. Her xanax needs to be refilled by a MD.

## 2022-03-03 ENCOUNTER — OFFICE VISIT (OUTPATIENT)
Dept: INTERNAL MEDICINE | Facility: CLINIC | Age: 32
End: 2022-03-03
Attending: FAMILY MEDICINE
Payer: COMMERCIAL

## 2022-03-03 VITALS
WEIGHT: 125.25 LBS | SYSTOLIC BLOOD PRESSURE: 118 MMHG | HEART RATE: 65 BPM | BODY MASS INDEX: 23.05 KG/M2 | DIASTOLIC BLOOD PRESSURE: 64 MMHG | OXYGEN SATURATION: 99 % | HEIGHT: 62 IN

## 2022-03-03 DIAGNOSIS — F41.8 SITUATIONAL ANXIETY: Primary | ICD-10-CM

## 2022-03-03 DIAGNOSIS — H60.331 ACUTE SWIMMER'S EAR OF RIGHT SIDE: ICD-10-CM

## 2022-03-03 PROCEDURE — 99999 PR PBB SHADOW E&M-EST. PATIENT-LVL III: CPT | Mod: PBBFAC,,, | Performed by: FAMILY MEDICINE

## 2022-03-03 PROCEDURE — 1159F PR MEDICATION LIST DOCUMENTED IN MEDICAL RECORD: ICD-10-PCS | Mod: CPTII,S$GLB,, | Performed by: FAMILY MEDICINE

## 2022-03-03 PROCEDURE — 3074F PR MOST RECENT SYSTOLIC BLOOD PRESSURE < 130 MM HG: ICD-10-PCS | Mod: CPTII,S$GLB,, | Performed by: FAMILY MEDICINE

## 2022-03-03 PROCEDURE — 1159F MED LIST DOCD IN RCRD: CPT | Mod: CPTII,S$GLB,, | Performed by: FAMILY MEDICINE

## 2022-03-03 PROCEDURE — 99214 PR OFFICE/OUTPT VISIT, EST, LEVL IV, 30-39 MIN: ICD-10-PCS | Mod: S$GLB,,, | Performed by: FAMILY MEDICINE

## 2022-03-03 PROCEDURE — 99999 PR PBB SHADOW E&M-EST. PATIENT-LVL III: ICD-10-PCS | Mod: PBBFAC,,, | Performed by: FAMILY MEDICINE

## 2022-03-03 PROCEDURE — 3008F BODY MASS INDEX DOCD: CPT | Mod: CPTII,S$GLB,, | Performed by: FAMILY MEDICINE

## 2022-03-03 PROCEDURE — 3074F SYST BP LT 130 MM HG: CPT | Mod: CPTII,S$GLB,, | Performed by: FAMILY MEDICINE

## 2022-03-03 PROCEDURE — 3008F PR BODY MASS INDEX (BMI) DOCUMENTED: ICD-10-PCS | Mod: CPTII,S$GLB,, | Performed by: FAMILY MEDICINE

## 2022-03-03 PROCEDURE — 1160F PR REVIEW ALL MEDS BY PRESCRIBER/CLIN PHARMACIST DOCUMENTED: ICD-10-PCS | Mod: CPTII,S$GLB,, | Performed by: FAMILY MEDICINE

## 2022-03-03 PROCEDURE — 1160F RVW MEDS BY RX/DR IN RCRD: CPT | Mod: CPTII,S$GLB,, | Performed by: FAMILY MEDICINE

## 2022-03-03 PROCEDURE — 99214 OFFICE O/P EST MOD 30 MIN: CPT | Mod: S$GLB,,, | Performed by: FAMILY MEDICINE

## 2022-03-03 PROCEDURE — 3078F PR MOST RECENT DIASTOLIC BLOOD PRESSURE < 80 MM HG: ICD-10-PCS | Mod: CPTII,S$GLB,, | Performed by: FAMILY MEDICINE

## 2022-03-03 PROCEDURE — 3078F DIAST BP <80 MM HG: CPT | Mod: CPTII,S$GLB,, | Performed by: FAMILY MEDICINE

## 2022-03-03 RX ORDER — BENZONATATE 100 MG/1
100-200 CAPSULE ORAL 3 TIMES DAILY PRN
COMMUNITY
Start: 2021-12-21 | End: 2022-07-20

## 2022-03-03 RX ORDER — NEOMYCIN SULFATE, POLYMYXIN B SULFATE AND HYDROCORTISONE 10; 3.5; 1 MG/ML; MG/ML; [USP'U]/ML
3 SUSPENSION/ DROPS AURICULAR (OTIC) 3 TIMES DAILY
Qty: 10 ML | Refills: 0 | Status: SHIPPED | OUTPATIENT
Start: 2022-03-03 | End: 2022-08-30 | Stop reason: ALTCHOICE

## 2022-03-03 RX ORDER — ALPRAZOLAM 0.5 MG/1
0.5 TABLET ORAL 3 TIMES DAILY PRN
Qty: 20 TABLET | Refills: 0 | Status: SHIPPED | OUTPATIENT
Start: 2022-03-03 | End: 2022-07-20 | Stop reason: SDUPTHER

## 2022-03-03 RX ORDER — PROMETHAZINE HYDROCHLORIDE AND DEXTROMETHORPHAN HYDROBROMIDE 6.25; 15 MG/5ML; MG/5ML
5 SYRUP ORAL NIGHTLY PRN
COMMUNITY
Start: 2021-12-21 | End: 2022-07-20

## 2022-03-03 NOTE — PROGRESS NOTES
"CHIEF COMPLAINT: Establish a primary care physician    HISTORY OF PRESENT ILLNESS: The patient is a generally healthy  year-old.  The patient has no specific complaints today.  It has been a while since the patient has been seen a primary care physician.  The patient wishes to establish a primary care physician.  The patient would also like to get some basic blood work done.    REVIEW OF SYSTEMS:  GENERAL: No fever, chills, fatigability or weight loss.  SKIN: No rashes, itching or changes in color or texture of skin.  HEAD: No headaches or recent head trauma.  EYES: Visual acuity fine. No photophobia, ocular pain or diplopia.  EARS: Denies ear pain, discharge or vertigo.  NOSE: No loss of smell, no epistaxis or postnasal drip.  MOUTH & THROAT: No hoarseness or change in voice. No excessive gum bleeding.  NODES: Denies swollen glands.  CHEST: Denies LAROSE, cyanosis, wheezing, cough and sputum production.  CARDIOVASCULAR: Denies chest pain, PND, orthopnea or reduced exercise tolerance.  ABDOMEN: Appetite fine. No weight loss. Denies diarrhea, abdominal pain, hematemesis or blood in stool.  URINARY: No flank pain, dysuria or hematuria.  PERIPHERAL VASCULAR: No claudication or cyanosis.  MUSCULOSKELETAL: No joint stiffness or swelling. Denies back pain.  NEUROLOGIC: No history of seizures, paralysis, alteration of gait or coordination.    SOCIAL HISTORY: The patient does not smoke.  The patient consumes alcohol socially.  The patient is single.    PHYSICAL EXAMINATION:   Blood pressure 118/64, pulse 65, height 5' 2" (1.575 m), weight 56.8 kg (125 lb 3.5 oz), SpO2 99 %.    APPEARANCE: Well nourished, well developed, in no acute distress.    HEAD: Normocephalic, atraumatic.  EYES: PERRL. EOMI.  Conjunctivae without injection and  anicteric  EARS:  Right TM is retracted with diminished Light reflex. No retraction or perforation on the left.    NOSE: Mucosa pink. Airway clear.  MOUTH & THROAT: No tonsillar enlargement. No " pharyngeal erythema or exudate. No stridor.  NECK: Supple.   NODES: No cervical, axillary or inguinal lymph node enlargement.  ABDOMEN: Bowel sounds normal. Not distended. Soft. No tenderness or masses.  No ascites is noted.  MUSCULOSKELETAL:  There is no clubbing, cyanosis, or edema of the extremities x4.  There is full range of motion of the lumbar spine.  There is full range of motion of the extremities x4.  There is no deformity noted.    NEUROLOGIC:       Normal speech development.      Hearing normal.      Normal gait.      Motor and sensory exams grossly normal  PSYCHIATRIC: Patient is alert and oriented x3.  Thought processes are all normal.  There is no homicidality.  There is no suicidality.  There is no evidence of psychosis.    LABORATORY/RADIOLOGY:   Chart reviewed.      ASSESSMENT:   Occasional anxiety mostly related to flying  Right OE    PLAN:  Follow up PCP  Today we will Refill Xanax.  Last filled 2 years ago for 20 pills.  Cortisporin otic

## 2022-05-02 ENCOUNTER — TELEPHONE (OUTPATIENT)
Dept: INTERNAL MEDICINE | Facility: CLINIC | Age: 32
End: 2022-05-02
Payer: COMMERCIAL

## 2022-05-02 DIAGNOSIS — Z00.00 ANNUAL PHYSICAL EXAM: Primary | ICD-10-CM

## 2022-05-03 ENCOUNTER — PATIENT MESSAGE (OUTPATIENT)
Dept: INTERNAL MEDICINE | Facility: CLINIC | Age: 32
End: 2022-05-03
Payer: COMMERCIAL

## 2022-05-04 ENCOUNTER — PATIENT MESSAGE (OUTPATIENT)
Dept: INTERNAL MEDICINE | Facility: CLINIC | Age: 32
End: 2022-05-04
Payer: COMMERCIAL

## 2022-05-04 DIAGNOSIS — Z00.00 ANNUAL PHYSICAL EXAM: Primary | ICD-10-CM

## 2022-05-23 ENCOUNTER — PATIENT MESSAGE (OUTPATIENT)
Dept: INTERNAL MEDICINE | Facility: CLINIC | Age: 32
End: 2022-05-23
Payer: COMMERCIAL

## 2022-05-23 DIAGNOSIS — Z00.00 ANNUAL PHYSICAL EXAM: Primary | ICD-10-CM

## 2022-06-02 ENCOUNTER — TELEPHONE (OUTPATIENT)
Dept: INTERNAL MEDICINE | Facility: CLINIC | Age: 32
End: 2022-06-02
Payer: COMMERCIAL

## 2022-06-02 NOTE — TELEPHONE ENCOUNTER
Handled in separate encounter    ----- Message from Ashleigh Clements sent at 6/2/2022  3:50 PM CDT -----  Type: Patient Call Back    Who called:Self    What is the request in detail: Pt requesting lab orders through [facility]    Can the clinic reply by [facility]? no    Would the patient rather a call back or a response via [facility]? Call back    Best call back number: [phone] (home)

## 2022-06-06 ENCOUNTER — PATIENT MESSAGE (OUTPATIENT)
Dept: INTERNAL MEDICINE | Facility: CLINIC | Age: 32
End: 2022-06-06
Payer: COMMERCIAL

## 2022-07-20 ENCOUNTER — OFFICE VISIT (OUTPATIENT)
Dept: INTERNAL MEDICINE | Facility: CLINIC | Age: 32
End: 2022-07-20
Attending: INTERNAL MEDICINE
Payer: COMMERCIAL

## 2022-07-20 ENCOUNTER — TELEPHONE (OUTPATIENT)
Dept: INTERNAL MEDICINE | Facility: CLINIC | Age: 32
End: 2022-07-20

## 2022-07-20 VITALS
OXYGEN SATURATION: 96 % | SYSTOLIC BLOOD PRESSURE: 118 MMHG | DIASTOLIC BLOOD PRESSURE: 72 MMHG | HEART RATE: 90 BPM | WEIGHT: 118.69 LBS | BODY MASS INDEX: 21.84 KG/M2 | HEIGHT: 62 IN

## 2022-07-20 DIAGNOSIS — F41.8 SITUATIONAL ANXIETY: ICD-10-CM

## 2022-07-20 DIAGNOSIS — Z00.00 ANNUAL PHYSICAL EXAM: Primary | ICD-10-CM

## 2022-07-20 DIAGNOSIS — F32.1 CURRENT MODERATE EPISODE OF MAJOR DEPRESSIVE DISORDER WITHOUT PRIOR EPISODE: ICD-10-CM

## 2022-07-20 PROCEDURE — 99212 OFFICE O/P EST SF 10 MIN: CPT | Mod: 25,S$GLB,, | Performed by: INTERNAL MEDICINE

## 2022-07-20 PROCEDURE — 99212 PR OFFICE/OUTPT VISIT, EST, LEVL II, 10-19 MIN: ICD-10-PCS | Mod: 25,S$GLB,, | Performed by: INTERNAL MEDICINE

## 2022-07-20 PROCEDURE — 3008F BODY MASS INDEX DOCD: CPT | Mod: CPTII,S$GLB,, | Performed by: INTERNAL MEDICINE

## 2022-07-20 PROCEDURE — 3078F DIAST BP <80 MM HG: CPT | Mod: CPTII,S$GLB,, | Performed by: INTERNAL MEDICINE

## 2022-07-20 PROCEDURE — 1159F MED LIST DOCD IN RCRD: CPT | Mod: CPTII,S$GLB,, | Performed by: INTERNAL MEDICINE

## 2022-07-20 PROCEDURE — 3008F PR BODY MASS INDEX (BMI) DOCUMENTED: ICD-10-PCS | Mod: CPTII,S$GLB,, | Performed by: INTERNAL MEDICINE

## 2022-07-20 PROCEDURE — 99999 PR PBB SHADOW E&M-EST. PATIENT-LVL III: CPT | Mod: PBBFAC,,, | Performed by: INTERNAL MEDICINE

## 2022-07-20 PROCEDURE — 3074F PR MOST RECENT SYSTOLIC BLOOD PRESSURE < 130 MM HG: ICD-10-PCS | Mod: CPTII,S$GLB,, | Performed by: INTERNAL MEDICINE

## 2022-07-20 PROCEDURE — 99999 PR PBB SHADOW E&M-EST. PATIENT-LVL III: ICD-10-PCS | Mod: PBBFAC,,, | Performed by: INTERNAL MEDICINE

## 2022-07-20 PROCEDURE — 99395 PREV VISIT EST AGE 18-39: CPT | Mod: S$GLB,,, | Performed by: INTERNAL MEDICINE

## 2022-07-20 PROCEDURE — 99395 PR PREVENTIVE VISIT,EST,18-39: ICD-10-PCS | Mod: S$GLB,,, | Performed by: INTERNAL MEDICINE

## 2022-07-20 PROCEDURE — 3078F PR MOST RECENT DIASTOLIC BLOOD PRESSURE < 80 MM HG: ICD-10-PCS | Mod: CPTII,S$GLB,, | Performed by: INTERNAL MEDICINE

## 2022-07-20 PROCEDURE — 1159F PR MEDICATION LIST DOCUMENTED IN MEDICAL RECORD: ICD-10-PCS | Mod: CPTII,S$GLB,, | Performed by: INTERNAL MEDICINE

## 2022-07-20 PROCEDURE — 3074F SYST BP LT 130 MM HG: CPT | Mod: CPTII,S$GLB,, | Performed by: INTERNAL MEDICINE

## 2022-07-20 RX ORDER — ALPRAZOLAM 0.5 MG/1
0.5 TABLET ORAL 3 TIMES DAILY PRN
Qty: 20 TABLET | Refills: 4 | Status: SHIPPED | OUTPATIENT
Start: 2022-07-20 | End: 2022-10-11 | Stop reason: ALTCHOICE

## 2022-07-20 NOTE — PROGRESS NOTES
"Subjective:       Patient ID: Daphne Sullivan is a 31 y.o. female.    Chief Complaint: Annual Exam     Daphne Sullivan is a 31 y.o.  female who presents for Annual Exam  .  No problems updated.  Describes having "body anxiety" during May. Hands shaking, tremor or "nerves".  Occurred when flying, mind racing, heart pounding and she was aware that she may be having a panic attack. Has a history of anxiety. During May she began being fearful of being trapped. Also going through a break up and moved out of her shared home.  She reports she has been feeling better,no recent episodes. Reports a baseline anxiety and some sadness but coping fairly well.   Mild hair loss, some weight loss, no palpitations, no heat or cold intolerance, no skin changes or bowel habit changes.    Has a counselor she sees regularly.  Uses her xanax infrequently and an rx. Keeping busy, exercising a lot. Feeling overwhelmed. No SI/HI.     Would be interested in doing testing for medications that would work for her.     Health Maintenance       Date Due Completion Date    Hepatitis C Screening Never done ---    COVID-19 Vaccine (1) Never done ---    Influenza Vaccine (1) 09/01/2022 ---    Cervical Cancer Screening 07/26/2026 7/26/2021    TETANUS VACCINE 05/06/2029 5/6/2019          Review of Systems   Constitutional: Negative for chills and fever.   Respiratory: Negative for cough and shortness of breath.    Cardiovascular: Negative for chest pain and palpitations.   Gastrointestinal: Negative for nausea and vomiting.   Genitourinary: Negative for dysuria and hematuria.   Psychiatric/Behavioral: Positive for dysphoric mood. Negative for self-injury, sleep disturbance and suicidal ideas. The patient is nervous/anxious.          Past Medical History:   Diagnosis Date    Anxiety disorder        Past Surgical History:   Procedure Laterality Date    NO PAST SURGERIES         Family History   Problem Relation Age of Onset    Heart " "disease Father 67        CAD--MI    Breast cancer Neg Hx     Cancer Neg Hx     Colon cancer Neg Hx     Diabetes Neg Hx     Eclampsia Neg Hx     Ovarian cancer Neg Hx      labor Neg Hx     Hypertension Neg Hx     Miscarriages / Stillbirths Neg Hx     Stroke Neg Hx        Social History     Tobacco Use    Smoking status: Never Smoker    Smokeless tobacco: Never Used   Substance Use Topics    Alcohol use: Yes     Alcohol/week: 2.0 standard drinks     Types: 2 Standard drinks or equivalent per week     Comment: socially    Drug use: No             Objective:   Blood pressure 118/72, pulse 90, height 5' 2" (1.575 m), weight 53.8 kg (118 lb 11.5 oz), SpO2 96 %.     Physical Exam  Constitutional:       Appearance: Normal appearance. She is well-developed. She is not ill-appearing.      Comments: Tearful when discussing triggering topics   HENT:      Head: Normocephalic and atraumatic.   Eyes:      General: No scleral icterus.     Conjunctiva/sclera: Conjunctivae normal.   Cardiovascular:      Rate and Rhythm: Normal rate.      Heart sounds: Normal heart sounds. No murmur heard.    No friction rub. No gallop.   Pulmonary:      Effort: Pulmonary effort is normal.      Breath sounds: Normal breath sounds. No wheezing or rales.   Chest:      Chest wall: No tenderness.   Abdominal:      General: Bowel sounds are normal.      Palpations: Abdomen is soft.   Musculoskeletal:         General: No tenderness or signs of injury.   Skin:     General: Skin is warm and dry.   Neurological:      Mental Status: She is alert and oriented to person, place, and time. Mental status is at baseline.      Comments: No tremor noted on exam today   Psychiatric:         Behavior: Behavior normal.         Thought Content: Thought content normal.         Prior labs reviewed  Assessment/Plan:        Daphne was seen today for annual exam.    Diagnoses and all orders for this visit:    Annual physical exam  -     Hepatitis C " Antibody; Future  -     HIV 1/2 Ag/Ab (4th Gen); Future  Requesting previous lab results from [facility]    Situational anxiety  -     ALPRAZolam (XANAX) 0.5 MG tablet; Take 1 tablet (0.5 mg total) by mouth 3 (three) times daily as needed for Anxiety.  Use sparingly   reviewed and appropriate    Current moderate episode of major depressive disorder without prior episode  -     PHARMACOGENOMICS PANEL; Future  Seeing counselor  Would benefit from ssri/ssnri   Pt will consider and contact clinic for rx or discuss on follow up VV in one month  Pt aware of likely out of pocket expense for testing between $100-$300 and agrees to testing      Medication List with Changes/Refills   Current Medications    AUROVELA FE 1-20, 28, 1 MG-20 MCG (21)/75 MG (7) PER TABLET    TAKE 1 TABLET BY MOUTH EVERY DAY    NEOMYCIN-POLYMYXIN-HYDROCORTISONE (CORTISPORIN) 3.5-10,000-1 MG/ML-UNIT/ML-% OTIC SUSPENSION    Place 3 drops into the right ear 3 (three) times daily.   Changed and/or Refilled Medications    Modified Medication Previous Medication    ALPRAZOLAM (XANAX) 0.5 MG TABLET ALPRAZolam (XANAX) 0.5 MG tablet       Take 1 tablet (0.5 mg total) by mouth 3 (three) times daily as needed for Anxiety.    Take 1 tablet (0.5 mg total) by mouth 3 (three) times daily as needed for Anxiety.   Discontinued Medications    BENZONATATE (TESSALON) 100 MG CAPSULE    Take 100-200 mg by mouth 3 (three) times daily as needed.    PROMETHAZINE-DEXTROMETHORPHAN (PROMETHAZINE-DM) 6.25-15 MG/5 ML SYRP    Take 5 mLs by mouth nightly as needed.

## 2022-07-20 NOTE — TELEPHONE ENCOUNTER
authorization request form for [facility] to release lab results from 1/1/22 to 7/20/22, fax number [phone]    Uploaded to media manager

## 2022-07-21 ENCOUNTER — TELEPHONE (OUTPATIENT)
Dept: INTERNAL MEDICINE | Facility: CLINIC | Age: 32
End: 2022-07-21
Payer: COMMERCIAL

## 2022-07-21 ENCOUNTER — PATIENT MESSAGE (OUTPATIENT)
Dept: INTERNAL MEDICINE | Facility: CLINIC | Age: 32
End: 2022-07-21
Payer: COMMERCIAL

## 2022-08-16 ENCOUNTER — TELEPHONE (OUTPATIENT)
Dept: INTERNAL MEDICINE | Facility: CLINIC | Age: 32
End: 2022-08-16

## 2022-08-16 ENCOUNTER — OFFICE VISIT (OUTPATIENT)
Dept: INTERNAL MEDICINE | Facility: CLINIC | Age: 32
End: 2022-08-16
Attending: INTERNAL MEDICINE
Payer: COMMERCIAL

## 2022-08-16 VITALS — DIASTOLIC BLOOD PRESSURE: 74 MMHG | SYSTOLIC BLOOD PRESSURE: 114 MMHG

## 2022-08-16 DIAGNOSIS — F40.10 SOCIAL ANXIETY DISORDER: Primary | ICD-10-CM

## 2022-08-16 PROCEDURE — 3074F SYST BP LT 130 MM HG: CPT | Mod: CPTII,95,, | Performed by: INTERNAL MEDICINE

## 2022-08-16 PROCEDURE — 3078F PR MOST RECENT DIASTOLIC BLOOD PRESSURE < 80 MM HG: ICD-10-PCS | Mod: CPTII,95,, | Performed by: INTERNAL MEDICINE

## 2022-08-16 PROCEDURE — 99213 OFFICE O/P EST LOW 20 MIN: CPT | Mod: 95,,, | Performed by: INTERNAL MEDICINE

## 2022-08-16 PROCEDURE — 1159F PR MEDICATION LIST DOCUMENTED IN MEDICAL RECORD: ICD-10-PCS | Mod: CPTII,95,, | Performed by: INTERNAL MEDICINE

## 2022-08-16 PROCEDURE — 99213 PR OFFICE/OUTPT VISIT, EST, LEVL III, 20-29 MIN: ICD-10-PCS | Mod: 95,,, | Performed by: INTERNAL MEDICINE

## 2022-08-16 PROCEDURE — 3078F DIAST BP <80 MM HG: CPT | Mod: CPTII,95,, | Performed by: INTERNAL MEDICINE

## 2022-08-16 PROCEDURE — 3074F PR MOST RECENT SYSTOLIC BLOOD PRESSURE < 130 MM HG: ICD-10-PCS | Mod: CPTII,95,, | Performed by: INTERNAL MEDICINE

## 2022-08-16 PROCEDURE — 1159F MED LIST DOCD IN RCRD: CPT | Mod: CPTII,95,, | Performed by: INTERNAL MEDICINE

## 2022-08-16 RX ORDER — SERTRALINE HYDROCHLORIDE 25 MG/1
25 TABLET, FILM COATED ORAL DAILY
Qty: 30 TABLET | Refills: 11 | Status: SHIPPED | OUTPATIENT
Start: 2022-08-16 | End: 2023-06-15

## 2022-08-16 NOTE — PROGRESS NOTES
The patient location is:  Patient Home   The chief complaint leading to consultation is:  Anxiety (F/U)    Subjective:         Has been paying attention to her axiety. Notices it mostly when in social situations. USing xanaz sparingly. Is interested in pharmacogenetics testing but has not scheduled yet.  Seeing counselor and working with meditation and other techniques to assist with her anxiety.      Daphne Sullivan is a 32 y.o.  female who presents for Anxiety (F/U)  .   Review of Systems   Constitutional: Negative for activity change and unexpected weight change.   HENT: Negative for hearing loss, rhinorrhea and trouble swallowing.    Eyes: Negative for discharge and visual disturbance.   Respiratory: Negative for chest tightness and wheezing.    Cardiovascular: Negative for chest pain and palpitations.   Gastrointestinal: Negative for blood in stool, constipation, diarrhea and vomiting.   Endocrine: Negative for polydipsia and polyuria.   Genitourinary: Negative for difficulty urinating, dysuria, hematuria and menstrual problem.   Musculoskeletal: Negative for arthralgias, joint swelling and neck pain.   Neurological: Negative for weakness and headaches.   Psychiatric/Behavioral: Negative for confusion and dysphoric mood.       No problems updated.    Past Medical History:   Diagnosis Date    Anxiety disorder        Past Surgical History:   Procedure Laterality Date    NO PAST SURGERIES         Family History   Problem Relation Age of Onset    Heart disease Father 67        CAD--MI    Breast cancer Neg Hx     Cancer Neg Hx     Colon cancer Neg Hx     Diabetes Neg Hx     Eclampsia Neg Hx     Ovarian cancer Neg Hx      labor Neg Hx     Hypertension Neg Hx     Miscarriages / Stillbirths Neg Hx     Stroke Neg Hx        Social History     Tobacco Use    Smoking status: Never Smoker    Smokeless tobacco: Never Used   Substance Use Topics    Alcohol use: Yes     Alcohol/week: 2.0 standard  drinks     Types: 2 Standard drinks or equivalent per week     Comment: socially    Drug use: No       Objective:   Blood pressure 114/74.     Physical Exam  Constitutional:       General: She is not in acute distress.     Appearance: She is well-developed. She is not diaphoretic.   HENT:      Head: Normocephalic and atraumatic.   Eyes:      General: No scleral icterus.        Right eye: No discharge.         Left eye: No discharge.   Pulmonary:      Effort: Pulmonary effort is normal. No respiratory distress.   Skin:     Coloration: Skin is not pale.      Findings: No erythema.   Neurological:      Mental Status: She is alert and oriented to person, place, and time.   Psychiatric:         Behavior: Behavior normal.         Thought Content: Thought content normal.           Prior labs reviewed  Assessment/Plan:        Daphne was seen today for anxiety.    Diagnoses and all orders for this visit:    Social anxiety disorder  -     Start sertraline (ZOLOFT) 25 MG tablet; Take 1 tablet (25 mg total) by mouth once daily.  Discussed risks vs benefits of therapy. Questions answered. Patient is in agreement with current plan.  Arrange labs  Plan pharm consult to assess ssri processing when results return  Follow up 4 weeks after starting medicaiton         Visit type: Virtual visit with synchronous audio and video    Interested in testing  Would like to do today  Follow up in 5 weeks  Trial of low dose zoloft    Total time spent with patient:15minutes    Each patient to whom he or she provides medical services by telemedicine is:  (1) informed of the relationship between the physician and patient and the respective role of any other health care provider with respect to management of the patient; and (2) notified that he or she may decline to receive medical services by telemedicine and may withdraw from such care at any time.  Medication List with Changes/Refills   New Medications    SERTRALINE (ZOLOFT) 25 MG TABLET     Take 1 tablet (25 mg total) by mouth once daily.   Current Medications    ALPRAZOLAM (XANAX) 0.5 MG TABLET    Take 1 tablet (0.5 mg total) by mouth 3 (three) times daily as needed for Anxiety.    AUROVELA FE 1-20, 28, 1 MG-20 MCG (21)/75 MG (7) PER TABLET    TAKE 1 TABLET BY MOUTH EVERY DAY    NEOMYCIN-POLYMYXIN-HYDROCORTISONE (CORTISPORIN) 3.5-10,000-1 MG/ML-UNIT/ML-% OTIC SUSPENSION    Place 3 drops into the right ear 3 (three) times daily.

## 2022-08-23 ENCOUNTER — PATIENT MESSAGE (OUTPATIENT)
Dept: INTERNAL MEDICINE | Facility: CLINIC | Age: 32
End: 2022-08-23
Payer: COMMERCIAL

## 2022-08-30 ENCOUNTER — OFFICE VISIT (OUTPATIENT)
Dept: URGENT CARE | Facility: CLINIC | Age: 32
End: 2022-08-30
Payer: COMMERCIAL

## 2022-08-30 VITALS
RESPIRATION RATE: 16 BRPM | HEIGHT: 62 IN | OXYGEN SATURATION: 98 % | TEMPERATURE: 98 F | SYSTOLIC BLOOD PRESSURE: 115 MMHG | HEART RATE: 67 BPM | BODY MASS INDEX: 22.08 KG/M2 | WEIGHT: 120 LBS | DIASTOLIC BLOOD PRESSURE: 74 MMHG

## 2022-08-30 DIAGNOSIS — J02.9 SORE THROAT: Primary | ICD-10-CM

## 2022-08-30 LAB
CTP QC/QA: YES
CTP QC/QA: YES
MOLECULAR STREP A: NEGATIVE
SARS-COV-2 RDRP RESP QL NAA+PROBE: NEGATIVE

## 2022-08-30 PROCEDURE — 3078F DIAST BP <80 MM HG: CPT | Mod: CPTII,S$GLB,, | Performed by: NURSE PRACTITIONER

## 2022-08-30 PROCEDURE — 99213 OFFICE O/P EST LOW 20 MIN: CPT | Mod: S$GLB,,, | Performed by: NURSE PRACTITIONER

## 2022-08-30 PROCEDURE — 3074F PR MOST RECENT SYSTOLIC BLOOD PRESSURE < 130 MM HG: ICD-10-PCS | Mod: CPTII,S$GLB,, | Performed by: NURSE PRACTITIONER

## 2022-08-30 PROCEDURE — 1160F PR REVIEW ALL MEDS BY PRESCRIBER/CLIN PHARMACIST DOCUMENTED: ICD-10-PCS | Mod: CPTII,S$GLB,, | Performed by: NURSE PRACTITIONER

## 2022-08-30 PROCEDURE — 3008F BODY MASS INDEX DOCD: CPT | Mod: CPTII,S$GLB,, | Performed by: NURSE PRACTITIONER

## 2022-08-30 PROCEDURE — 3074F SYST BP LT 130 MM HG: CPT | Mod: CPTII,S$GLB,, | Performed by: NURSE PRACTITIONER

## 2022-08-30 PROCEDURE — 99213 PR OFFICE/OUTPT VISIT, EST, LEVL III, 20-29 MIN: ICD-10-PCS | Mod: S$GLB,,, | Performed by: NURSE PRACTITIONER

## 2022-08-30 PROCEDURE — 87651 STREP A DNA AMP PROBE: CPT | Mod: QW,S$GLB,, | Performed by: NURSE PRACTITIONER

## 2022-08-30 PROCEDURE — 87651 POCT STREP A MOLECULAR: ICD-10-PCS | Mod: QW,S$GLB,, | Performed by: NURSE PRACTITIONER

## 2022-08-30 PROCEDURE — U0002: ICD-10-PCS | Mod: QW,S$GLB,, | Performed by: NURSE PRACTITIONER

## 2022-08-30 PROCEDURE — U0002 COVID-19 LAB TEST NON-CDC: HCPCS | Mod: QW,S$GLB,, | Performed by: NURSE PRACTITIONER

## 2022-08-30 PROCEDURE — 1160F RVW MEDS BY RX/DR IN RCRD: CPT | Mod: CPTII,S$GLB,, | Performed by: NURSE PRACTITIONER

## 2022-08-30 PROCEDURE — 1159F PR MEDICATION LIST DOCUMENTED IN MEDICAL RECORD: ICD-10-PCS | Mod: CPTII,S$GLB,, | Performed by: NURSE PRACTITIONER

## 2022-08-30 PROCEDURE — 3008F PR BODY MASS INDEX (BMI) DOCUMENTED: ICD-10-PCS | Mod: CPTII,S$GLB,, | Performed by: NURSE PRACTITIONER

## 2022-08-30 PROCEDURE — 1159F MED LIST DOCD IN RCRD: CPT | Mod: CPTII,S$GLB,, | Performed by: NURSE PRACTITIONER

## 2022-08-30 PROCEDURE — 3078F PR MOST RECENT DIASTOLIC BLOOD PRESSURE < 80 MM HG: ICD-10-PCS | Mod: CPTII,S$GLB,, | Performed by: NURSE PRACTITIONER

## 2022-08-30 NOTE — PROGRESS NOTES
"Subjective:       Patient ID: Daphne Sullivan is a 32 y.o. female.    Vitals:  height is 5' 2" (1.575 m) and weight is 54.4 kg (120 lb). Her oral temperature is 98 °F (36.7 °C). Her blood pressure is 115/74 and her pulse is 67. Her respiration is 16 and oxygen saturation is 98%.     Chief Complaint: Sore Throat    Patient stated of having a sore throat.  Symptom occurred on August 29, 2022.  Patient is Covid vaccinated.  Provider note begins below    Patient reports sore throat starting today.  Denies any fever cough congestion.  Denies any ill contacts.  States she just moved into a new apartment she is not sure if it allergies.    Sore Throat   This is a new problem. The current episode started yesterday. The problem has been unchanged. There has been no fever. Pertinent negatives include no abdominal pain, congestion, coughing, diarrhea, drooling, ear discharge, ear pain, headaches, hoarse voice, plugged ear sensation, neck pain, shortness of breath, stridor, swollen glands, trouble swallowing or vomiting. She has tried nothing for the symptoms.     HENT:  Positive for sore throat. Negative for ear pain, ear discharge, drooling, congestion and trouble swallowing.    Neck: Negative for neck pain.   Respiratory:  Negative for cough, shortness of breath and stridor.    Gastrointestinal:  Negative for abdominal pain, vomiting and diarrhea.   Neurological:  Negative for headaches.     Objective:      Physical Exam   Constitutional: She is oriented to person, place, and time.   HENT:   Head: Normocephalic and atraumatic.   Cardiovascular: Normal rate.   Pulmonary/Chest: Effort normal. No respiratory distress.   Abdominal: Normal appearance.   Neurological: She is alert and oriented to person, place, and time.   Skin: Skin is warm and dry.   Psychiatric: Her behavior is normal. Mood normal.           Results for orders placed or performed in visit on 08/30/22   POCT Strep A, Molecular   Result Value Ref Range    " Molecular Strep A, POC Negative Negative     Acceptable Yes    POCT COVID-19 Rapid Screening   Result Value Ref Range    POC Rapid COVID Negative Negative     Acceptable Yes         Assessment:       1. Sore throat          Plan:       Strep test negative  Covid test negative  Return to clinic or follow-up symptoms worsen do not improve        Sore throat  -     POCT Strep A, Molecular  -     POCT COVID-19 Rapid Screening

## 2022-09-20 ENCOUNTER — TELEPHONE (OUTPATIENT)
Dept: INTERNAL MEDICINE | Facility: CLINIC | Age: 32
End: 2022-09-20

## 2022-09-20 NOTE — TELEPHONE ENCOUNTER
----- Message from Bel Abad sent at 5/2/2022  2:16 PM CDT -----  Pt is requesting labs prior to appt!    
----- Message from Bel Abad sent at 5/2/2022  7:39 AM CDT -----  Pt is requesting annual labs prior to appointment. Please call patient and advise thanks      
Requested labs has been pended and routed to Dr. Meneses  for approval. LOV 3/3/22 Upcoming visits 7/20/22     
Improved

## 2022-09-29 ENCOUNTER — PATIENT MESSAGE (OUTPATIENT)
Dept: OBSTETRICS AND GYNECOLOGY | Facility: CLINIC | Age: 32
End: 2022-09-29
Payer: COMMERCIAL

## 2022-10-04 ENCOUNTER — OFFICE VISIT (OUTPATIENT)
Dept: INTERNAL MEDICINE | Facility: CLINIC | Age: 32
End: 2022-10-04
Attending: INTERNAL MEDICINE
Payer: COMMERCIAL

## 2022-10-04 VITALS
BODY MASS INDEX: 22.07 KG/M2 | SYSTOLIC BLOOD PRESSURE: 115 MMHG | HEIGHT: 62 IN | DIASTOLIC BLOOD PRESSURE: 74 MMHG | WEIGHT: 119.94 LBS

## 2022-10-04 DIAGNOSIS — F40.10 SOCIAL ANXIETY DISORDER: Primary | ICD-10-CM

## 2022-10-04 DIAGNOSIS — G44.229 CHRONIC TENSION-TYPE HEADACHE, NOT INTRACTABLE: ICD-10-CM

## 2022-10-04 DIAGNOSIS — Z00.00 ANNUAL PHYSICAL EXAM: ICD-10-CM

## 2022-10-04 DIAGNOSIS — G89.29 CHRONIC RIGHT-SIDED LOW BACK PAIN WITHOUT SCIATICA: ICD-10-CM

## 2022-10-04 DIAGNOSIS — M54.50 CHRONIC RIGHT-SIDED LOW BACK PAIN WITHOUT SCIATICA: ICD-10-CM

## 2022-10-04 PROCEDURE — 3008F PR BODY MASS INDEX (BMI) DOCUMENTED: ICD-10-PCS | Mod: CPTII,95,, | Performed by: INTERNAL MEDICINE

## 2022-10-04 PROCEDURE — 99214 OFFICE O/P EST MOD 30 MIN: CPT | Mod: 95,,, | Performed by: INTERNAL MEDICINE

## 2022-10-04 PROCEDURE — 3074F PR MOST RECENT SYSTOLIC BLOOD PRESSURE < 130 MM HG: ICD-10-PCS | Mod: CPTII,95,, | Performed by: INTERNAL MEDICINE

## 2022-10-04 PROCEDURE — 3078F DIAST BP <80 MM HG: CPT | Mod: CPTII,95,, | Performed by: INTERNAL MEDICINE

## 2022-10-04 PROCEDURE — 3008F BODY MASS INDEX DOCD: CPT | Mod: CPTII,95,, | Performed by: INTERNAL MEDICINE

## 2022-10-04 PROCEDURE — 1159F MED LIST DOCD IN RCRD: CPT | Mod: CPTII,95,, | Performed by: INTERNAL MEDICINE

## 2022-10-04 PROCEDURE — 3078F PR MOST RECENT DIASTOLIC BLOOD PRESSURE < 80 MM HG: ICD-10-PCS | Mod: CPTII,95,, | Performed by: INTERNAL MEDICINE

## 2022-10-04 PROCEDURE — 1159F PR MEDICATION LIST DOCUMENTED IN MEDICAL RECORD: ICD-10-PCS | Mod: CPTII,95,, | Performed by: INTERNAL MEDICINE

## 2022-10-04 PROCEDURE — 99214 PR OFFICE/OUTPT VISIT, EST, LEVL IV, 30-39 MIN: ICD-10-PCS | Mod: 95,,, | Performed by: INTERNAL MEDICINE

## 2022-10-04 PROCEDURE — 3074F SYST BP LT 130 MM HG: CPT | Mod: CPTII,95,, | Performed by: INTERNAL MEDICINE

## 2022-10-04 NOTE — ASSESSMENT & PLAN NOTE
Recommend gentle stretching  otc pain medication  Discuss with PMR during evaluation for back pain  Refer to pain management for treatment options

## 2022-10-04 NOTE — PROGRESS NOTES
The patient location is:  Patient Home   The chief complaint leading to consultation is:  Medication Management    Subjective:         Follow up of anxiety disorder. Reports no recent panic attacks or feelings of severe anxiety in social situations. Taking zoloft daily, did well in situations which would normally induce her anxiety, including when flying.  Sleep mildly effect so switched to morning dosing and this has helped. Has not needed her xanax.     Has low back pain and tension headaches.  Headaches and back pain worse with playing soccer.  Had lumbar xrays with her chiropractor and told fine. Has pain if sits or stands for too long. Hip to low back area.  No issues with bowel or bladder.  No numbness or weakness.  Started a year ago, occurs weekly.  Pain today 0/10, up to 10.10 with exacerbations. Hurt recently after sitting in a movie theatre. Improves with tylenol.     Having tension headaches at least once a week. Down back of neck, right greater than left. Using ice or hot shower with improvement. No visual changes.   Denies grinding teeth. No trauma but does play soccer.      Daphne Sullivan is a 32 y.o.  female who presents for Medication Management  .   Review of Systems   Constitutional:  Negative for activity change and unexpected weight change.   HENT:  Negative for hearing loss, rhinorrhea and trouble swallowing.    Eyes:  Negative for discharge and visual disturbance.   Respiratory:  Negative for chest tightness and wheezing.    Cardiovascular:  Negative for chest pain and palpitations.   Gastrointestinal:  Negative for blood in stool, constipation, diarrhea and vomiting.   Endocrine: Negative for polydipsia and polyuria.   Genitourinary:  Negative for difficulty urinating, dysuria, hematuria and menstrual problem.   Musculoskeletal:  Positive for arthralgias and neck pain. Negative for joint swelling.   Neurological:  Positive for headaches. Negative for weakness.  "  Psychiatric/Behavioral:  Negative for confusion and dysphoric mood.      Patient Active Problem List   Diagnosis    Anal fissure    Fissure in ano    Social anxiety disorder    Chronic right-sided low back pain without sciatica    Chronic tension-type headache, not intractable       Past Medical History:   Diagnosis Date    Anxiety disorder        Past Surgical History:   Procedure Laterality Date    NO PAST SURGERIES         Family History   Problem Relation Age of Onset    Heart disease Father 67        CAD--MI    Breast cancer Neg Hx     Cancer Neg Hx     Colon cancer Neg Hx     Diabetes Neg Hx     Eclampsia Neg Hx     Ovarian cancer Neg Hx      labor Neg Hx     Hypertension Neg Hx     Miscarriages / Stillbirths Neg Hx     Stroke Neg Hx        Social History     Tobacco Use    Smoking status: Never    Smokeless tobacco: Never   Substance Use Topics    Alcohol use: Yes     Alcohol/week: 2.0 standard drinks     Types: 2 Standard drinks or equivalent per week     Comment: socially    Drug use: No       Objective:   Blood pressure 115/74, height 5' 2" (1.575 m), weight 54.4 kg (119 lb 14.9 oz).     Physical Exam  Constitutional:       General: She is not in acute distress.     Appearance: She is well-developed. She is not diaphoretic.   HENT:      Head: Normocephalic and atraumatic.   Eyes:      General: No scleral icterus.        Right eye: No discharge.         Left eye: No discharge.   Pulmonary:      Effort: Pulmonary effort is normal. No respiratory distress.   Skin:     Coloration: Skin is not pale.      Findings: No erythema.   Neurological:      Mental Status: She is alert and oriented to person, place, and time.   Psychiatric:         Behavior: Behavior normal.         Thought Content: Thought content normal.         Prior labs reviewed  Assessment/Plan:       1. Social anxiety disorder  Overview:  Started zoloft 25 mg  doing well, not using xanax, able to fly, attend social events without " "issues    Assessment & Plan:  10/4/22Cancel pharmocogenetics test, consider in future if needed  Cont low dose zoloft  Pt would like to reassess in 9 mo-KBB      2. Chronic right-sided low back pain without sciatica  Overview:  Seeing chiropractor, lumbar xrays with "some curve" but otherwise normal per pt  Exacerbated with exercise, prolonged sitting or standing  Improves with tylenol    Assessment & Plan:  Currently pain free  Refer to healthy back PMR for exam and PT   Cont prn otc meds    Orders:  -     Ambulatory referral/consult to [facility]; Future; Expected date: 10/11/2022    3. Chronic tension-type headache, not intractable  Overview:  Right greater than left sided    Assessment & Plan:  Recommend gentle stretching  otc pain medication  Discuss with PMR during evaluation for back pain  Refer to pain management for treatment options    Orders:  -     Ambulatory referral/consult to Pain Clinic; Future; Expected date: 10/11/2022          Visit type: Virtual visit with synchronous audio and video    Total time spent with patient: 26 minutes    Each patient to whom he or she provides medical services by telemedicine is:  (1) informed of the relationship between the physician and patient and the respective role of any other health care provider with respect to management of the patient; and (2) notified that he or she may decline to receive medical services by telemedicine and may withdraw from such care at any time.  Medication List with Changes/Refills   Current Medications    ALPRAZOLAM (XANAX) 0.5 MG TABLET    Take 1 tablet (0.5 mg total) by mouth 3 (three) times daily as needed for Anxiety.    NORETHINDRONE-ETHINYL ESTRADIOL (BLISOVI FE 1/20, 28,) 1 MG-20 MCG (21)/75 MG (7) PER TABLET    TAKE 1 TABLET BY MOUTH EVERY DAY    SERTRALINE (ZOLOFT) 25 MG TABLET    Take 1 tablet (25 mg total) by mouth once daily.         "

## 2022-10-04 NOTE — ASSESSMENT & PLAN NOTE
10/4/22Cancel pharmocogenetics test, consider in future if needed  Cont low dose zoloft  Pt would like to reassess in 9 mo-KBB   12-Dec-2020 10:28

## 2022-10-05 ENCOUNTER — TELEPHONE (OUTPATIENT)
Dept: INTERNAL MEDICINE | Facility: CLINIC | Age: 32
End: 2022-10-05
Payer: COMMERCIAL

## 2022-10-05 ENCOUNTER — TELEPHONE (OUTPATIENT)
Dept: SPINE | Facility: CLINIC | Age: 32
End: 2022-10-05
Payer: COMMERCIAL

## 2022-10-05 NOTE — TELEPHONE ENCOUNTER
9 month f/u letter sent out since July is too far out to schedule, pharmacogenics already cancelled yesterday; labs scheduled for July (prior to next visit).

## 2022-10-05 NOTE — TELEPHONE ENCOUNTER
This message is for patient in regards to his/her appointment 10/6/22 at 9:00a   With rSavan Muse.        [facility] Policy: For the safety of all patients and staff members.   During this visit we're informing all patients and visitors to wear face mask at all time here at [facility].  If you have any questions or concerns please contact [phone]       also inquired IPM within message.    [facility] providers and Mid-levels offer interventional, procedure--based options to treat chronic pain. The goal is to manage chronic pain by reducing pain frequency and intensity and address your functional goals for activities of daily living while simultaneously reducing or eliminating your reliance on medications. Please bring any records or images that you have from prior treatments for your pain. You will be presented with multi-modal treatment plan that may or may not include imaging, interventional procedures, physical/occupational/aqua therapy, pain creams, and non-narcotic pain medications used for the treatments of chronic pain.

## 2022-10-05 NOTE — TELEPHONE ENCOUNTER
Dispositions   Follow up in about 9 months (around 7/4/2023).     Cancel pharmocogenetics test please, and schedule annual labs- two sets previously ordered on different dates

## 2022-10-06 ENCOUNTER — HOSPITAL ENCOUNTER (OUTPATIENT)
Dept: RADIOLOGY | Facility: OTHER | Age: 32
Discharge: HOME | End: 2022-10-06
Attending: ANESTHESIOLOGY
Payer: COMMERCIAL

## 2022-10-06 ENCOUNTER — OFFICE VISIT (OUTPATIENT)
Dept: SPINE | Facility: CLINIC | Age: 32
End: 2022-10-06
Attending: INTERNAL MEDICINE
Payer: COMMERCIAL

## 2022-10-06 VITALS
BODY MASS INDEX: 21.51 KG/M2 | OXYGEN SATURATION: 100 % | WEIGHT: 116.88 LBS | DIASTOLIC BLOOD PRESSURE: 83 MMHG | HEIGHT: 62 IN | SYSTOLIC BLOOD PRESSURE: 109 MMHG | HEART RATE: 55 BPM | RESPIRATION RATE: 18 BRPM

## 2022-10-06 DIAGNOSIS — R51.9 CHRONIC NONINTRACTABLE HEADACHE, UNSPECIFIED HEADACHE TYPE: Primary | ICD-10-CM

## 2022-10-06 DIAGNOSIS — G89.29 CHRONIC NONINTRACTABLE HEADACHE, UNSPECIFIED HEADACHE TYPE: Primary | ICD-10-CM

## 2022-10-06 DIAGNOSIS — G44.229 CHRONIC TENSION-TYPE HEADACHE, NOT INTRACTABLE: ICD-10-CM

## 2022-10-06 DIAGNOSIS — M47.816 LUMBAR SPONDYLOSIS: ICD-10-CM

## 2022-10-06 DIAGNOSIS — M54.2 NECK PAIN: ICD-10-CM

## 2022-10-06 PROCEDURE — 72114 X-RAY EXAM L-S SPINE BENDING: CPT | Mod: 26,,, | Performed by: RADIOLOGY

## 2022-10-06 PROCEDURE — 1160F RVW MEDS BY RX/DR IN RCRD: CPT | Mod: CPTII,S$GLB,, | Performed by: ANESTHESIOLOGY

## 2022-10-06 PROCEDURE — 99999 PR PBB SHADOW E&M-EST. PATIENT-LVL V: CPT | Mod: PBBFAC,,, | Performed by: ANESTHESIOLOGY

## 2022-10-06 PROCEDURE — 3074F PR MOST RECENT SYSTOLIC BLOOD PRESSURE < 130 MM HG: ICD-10-PCS | Mod: CPTII,S$GLB,, | Performed by: ANESTHESIOLOGY

## 2022-10-06 PROCEDURE — 1159F MED LIST DOCD IN RCRD: CPT | Mod: CPTII,S$GLB,, | Performed by: ANESTHESIOLOGY

## 2022-10-06 PROCEDURE — 1159F PR MEDICATION LIST DOCUMENTED IN MEDICAL RECORD: ICD-10-PCS | Mod: CPTII,S$GLB,, | Performed by: ANESTHESIOLOGY

## 2022-10-06 PROCEDURE — 1160F PR REVIEW ALL MEDS BY PRESCRIBER/CLIN PHARMACIST DOCUMENTED: ICD-10-PCS | Mod: CPTII,S$GLB,, | Performed by: ANESTHESIOLOGY

## 2022-10-06 PROCEDURE — 72114 X-RAY EXAM L-S SPINE BENDING: CPT | Mod: TC,FY

## 2022-10-06 PROCEDURE — 99999 PR PBB SHADOW E&M-EST. PATIENT-LVL V: ICD-10-PCS | Mod: PBBFAC,,, | Performed by: ANESTHESIOLOGY

## 2022-10-06 PROCEDURE — 3008F BODY MASS INDEX DOCD: CPT | Mod: CPTII,S$GLB,, | Performed by: ANESTHESIOLOGY

## 2022-10-06 PROCEDURE — 3008F PR BODY MASS INDEX (BMI) DOCUMENTED: ICD-10-PCS | Mod: CPTII,S$GLB,, | Performed by: ANESTHESIOLOGY

## 2022-10-06 PROCEDURE — 3074F SYST BP LT 130 MM HG: CPT | Mod: CPTII,S$GLB,, | Performed by: ANESTHESIOLOGY

## 2022-10-06 PROCEDURE — 3079F PR MOST RECENT DIASTOLIC BLOOD PRESSURE 80-89 MM HG: ICD-10-PCS | Mod: CPTII,S$GLB,, | Performed by: ANESTHESIOLOGY

## 2022-10-06 PROCEDURE — 72114 XR LUMBAR SPINE 5 VIEW WITH FLEX AND EXT: ICD-10-PCS | Mod: 26,,, | Performed by: RADIOLOGY

## 2022-10-06 PROCEDURE — 99204 OFFICE O/P NEW MOD 45 MIN: CPT | Mod: S$GLB,,, | Performed by: ANESTHESIOLOGY

## 2022-10-06 PROCEDURE — 99204 PR OFFICE/OUTPT VISIT, NEW, LEVL IV, 45-59 MIN: ICD-10-PCS | Mod: S$GLB,,, | Performed by: ANESTHESIOLOGY

## 2022-10-06 PROCEDURE — 3079F DIAST BP 80-89 MM HG: CPT | Mod: CPTII,S$GLB,, | Performed by: ANESTHESIOLOGY

## 2022-10-06 RX ORDER — NAPROXEN 500 MG/1
500 TABLET ORAL 2 TIMES DAILY
Qty: 60 TABLET | Refills: 1 | Status: SHIPPED | OUTPATIENT
Start: 2022-10-06 | End: 2022-11-05

## 2022-10-06 NOTE — PROGRESS NOTES
"Chronic Pain - New Consult    Referring Physician: Indigo Meneses MD    Chief Complaint:   Chief Complaint   Patient presents with    Low-back Pain    Hip Pain    Migraine     Right side         SUBJECTIVE:    Daphne Sullivan presents to the clinic for the evaluation of tension head and lower back pain. The headaches are one every three weeks. The pain started 1 ago without any precipitating injury and symptoms have been worsening.The pain is located in the right lower lumbar area and radiates to the right neck.  The pain is described as aching and is rated as 2/10. The pain is rated with a score of  1/10 on the BEST day and a score of 7/10 on the WORST day.  Symptoms interfere with playing soccer. The pain is exacerbated by Sitting and Standing for long periods of time  The pain is mitigated by ice, rest, and stretching. The patient reports 8 hours of uninterrupted sleep per night. Patient has been seen y chiropractor who states that the x-rays showed scoliosis, but otherwise lookd "fine."     Headaches have been present for several years. Reports that she has headaches that she feels are connected to her hamstrings and started 3 years. Patient reports that when her hamstring is tight she feels the pain radiates to her right head causing her headache and the pain is relieved with stretching of the hamstring.       Patient denies night fever/night sweats, urinary incontinence, bowel incontinence, significant weight loss, significant motor weakness, and loss of sensations.    Physical Therapy/Home Exercise: Home exercises      Pain Disability Index Review:  Last 3 PDI Scores 10/6/2022   Pain Disability Index (PDI) 56       Pain Medications:    OTC tylenol or excedrin     report:  Reviewed and consistent with medication use as prescribed.    Pain Procedures: None    Imaging: None    Past Medical History:   Diagnosis Date    Anxiety disorder      Past Surgical History:   Procedure Laterality Date    NO " PAST SURGERIES       Social History     Socioeconomic History    Marital status: Significant Other   Occupational History    Occupation:    Tobacco Use    Smoking status: Never    Smokeless tobacco: Never   Substance and Sexual Activity    Alcohol use: Yes     Alcohol/week: 2.0 standard drinks     Types: 2 Standard drinks or equivalent per week     Comment: socially    Drug use: No    Sexual activity: Yes     Partners: Male     Birth control/protection: OCP     Family History   Problem Relation Age of Onset    Heart disease Father 67        CAD--MI    Breast cancer Neg Hx     Cancer Neg Hx     Colon cancer Neg Hx     Diabetes Neg Hx     Eclampsia Neg Hx     Ovarian cancer Neg Hx      labor Neg Hx     Hypertension Neg Hx     Miscarriages / Stillbirths Neg Hx     Stroke Neg Hx        Review of patient's allergies indicates:  No Known Allergies    Current Outpatient Medications   Medication Sig    norethindrone-ethinyl estradiol (BLISOVI FE , ,) 1 mg-20 mcg (21)/75 mg (7) per tablet TAKE 1 TABLET BY MOUTH EVERY DAY    sertraline (ZOLOFT) 25 MG tablet Take 1 tablet (25 mg total) by mouth once daily.    ALPRAZolam (XANAX) 0.5 MG tablet Take 1 tablet (0.5 mg total) by mouth 3 (three) times daily as needed for Anxiety.    naproxen (NAPROSYN) 500 MG tablet Take 1 tablet (500 mg total) by mouth 2 (two) times daily.     No current facility-administered medications for this visit.       REVIEW OF SYSTEMS:    GENERAL:  No weight loss, malaise or fevers.  HEENT:  Negative for frequent or significant headaches.  NECK:  Negative for lumps, goiter, pain and significant neck swelling.  RESPIRATORY:  Negative for cough, wheezing or shortness of breath.  CARDIOVASCULAR:  Negative for chest pain, leg swelling or palpitations.  GI:  Negative for abdominal discomfort, blood in stools or black stools or change in bowel habits.  MUSCULOSKELETAL:  See HPI.  SKIN:  Negative for lesions, rash, and  "itching.  PSYCH:  Negative for sleep disturbance, mood disorder and recent psychosocial stressors.  HEMATOLOGY/LYMPHOLOGY:  Negative for prolonged bleeding, bruising easily or swollen nodes.  NEURO:   No history of headaches, syncope, paralysis, seizures or tremors.  All other reviewed and negative other than HPI.    OBJECTIVE:    /83   Pulse (!) 55   Resp 18   Ht 5' 2" (1.575 m)   Wt 53 kg (116 lb 13.5 oz)   SpO2 100%   BMI 21.37 kg/m²     PHYSICAL EXAMINATION:    General appearance: Well appearing, in no acute distress, alert and oriented x3.  Psych:  Mood and affect appropriate.  Skin: Skin color, texture, turgor normal, no rashes or lesions, in both upper and lower body.  Head/face:  Normocephalic, atraumatic. No palpable lymph nodes.  Neck: No pain to palpation over the cervical paraspinous muscles. Spurling Negative. No pain with neck flexion, extension, or lateral flexion.   Back: Straight leg raising in the sitting and supine positions is negative to radicular pain. No pain to palpation over the spine or costovertebral angles. Normal range of motion without pain reproduction. Pain tight right side with facet loading.  Negative tenderness over both SIJ, negative FABERE,Ganselin and Yeoman's test on the both side. Negative FADIR  Extremities: Peripheral joint ROM is full and pain free without obvious instability or laxity in all four extremities. No deformities, edema, or skin discoloration. Good capillary refill.  Musculoskeletal: Hip and sacroiliac provocative maneuvers are negative. Bilateral upper and lower extremity strength is normal and symmetric.  No atrophy or tone abnormalities are noted.  Neuro: Bilateral upper and lower extremity coordination and muscle stretch reflexes are physiologic and symmetric.  Plantar response are downgoing. No loss of sensation is noted.  Gait: normal.    ASSESSMENT: 32 y.o. year old female with right lower back pain and headches, consistent with      1. " Chronic nonintractable headache, unspecified headache type        2. Chronic tension-type headache, not intractable  Ambulatory referral/consult to Pain Clinic    Ambulatory referral/consult to Neurology      3. Neck pain  Ambulatory referral/consult to Physical/Occupational Therapy      4. Lumbar spondylosis  X-Ray Lumbar Complete Including Flex And Ext    Ambulatory referral/consult to Physical/Occupational Therapy            PLAN:   Physical therapy for neck and back with dry needling.   X-ray Flex / ext  Will refer to headache neurology for her headaches  Naprosyn 500mg PO BID PRN pain, warned not to use to frequently as it might lead to more headaches  Flexeril 5 mg nightly as needed.  5.   RTC in 6 weeks and will consider MRI if beck pain not improving.   - Counseled patient regarding the importance of physical therapy.    The above plan and management options were discussed at length with patient. Patient is in agreement with the above and verbalized understanding. It will be communicated with the referring physician via electronic record, fax, or mail.    ALEXIA EDWARDS  10/06/2022    I have reviewed and concur with the resident's history, physical, assessment, and plan.  I have personally interviewed and examined the patient at bedside.  See below addendum for my evaluation and additional findings.    Sravan Howell MD       Private Vehicle

## 2022-10-07 ENCOUNTER — PATIENT MESSAGE (OUTPATIENT)
Dept: ADMINISTRATIVE | Facility: OTHER | Age: 32
End: 2022-10-07
Payer: COMMERCIAL

## 2022-10-10 ENCOUNTER — PATIENT MESSAGE (OUTPATIENT)
Dept: NEUROLOGY | Facility: CLINIC | Age: 32
End: 2022-10-10
Payer: COMMERCIAL

## 2022-10-10 ENCOUNTER — PATIENT MESSAGE (OUTPATIENT)
Dept: OBSTETRICS AND GYNECOLOGY | Facility: CLINIC | Age: 32
End: 2022-10-10
Payer: COMMERCIAL

## 2022-10-11 ENCOUNTER — OFFICE VISIT (OUTPATIENT)
Dept: OBSTETRICS AND GYNECOLOGY | Facility: CLINIC | Age: 32
End: 2022-10-11
Attending: OBSTETRICS & GYNECOLOGY
Payer: COMMERCIAL

## 2022-10-11 VITALS
HEIGHT: 62 IN | SYSTOLIC BLOOD PRESSURE: 100 MMHG | BODY MASS INDEX: 22.07 KG/M2 | DIASTOLIC BLOOD PRESSURE: 60 MMHG | WEIGHT: 119.94 LBS

## 2022-10-11 DIAGNOSIS — Z01.419 WELL FEMALE EXAM WITH ROUTINE GYNECOLOGICAL EXAM: Primary | ICD-10-CM

## 2022-10-11 DIAGNOSIS — Z30.8 ENCOUNTER FOR OTHER CONTRACEPTIVE MANAGEMENT: ICD-10-CM

## 2022-10-11 PROCEDURE — 3078F PR MOST RECENT DIASTOLIC BLOOD PRESSURE < 80 MM HG: ICD-10-PCS | Mod: CPTII,S$GLB,, | Performed by: OBSTETRICS & GYNECOLOGY

## 2022-10-11 PROCEDURE — 1159F MED LIST DOCD IN RCRD: CPT | Mod: CPTII,S$GLB,, | Performed by: OBSTETRICS & GYNECOLOGY

## 2022-10-11 PROCEDURE — 1160F RVW MEDS BY RX/DR IN RCRD: CPT | Mod: CPTII,S$GLB,, | Performed by: OBSTETRICS & GYNECOLOGY

## 2022-10-11 PROCEDURE — 99999 PR PBB SHADOW E&M-EST. PATIENT-LVL III: CPT | Mod: PBBFAC,,, | Performed by: OBSTETRICS & GYNECOLOGY

## 2022-10-11 PROCEDURE — 99395 PREV VISIT EST AGE 18-39: CPT | Mod: S$GLB,,, | Performed by: OBSTETRICS & GYNECOLOGY

## 2022-10-11 PROCEDURE — 3074F PR MOST RECENT SYSTOLIC BLOOD PRESSURE < 130 MM HG: ICD-10-PCS | Mod: CPTII,S$GLB,, | Performed by: OBSTETRICS & GYNECOLOGY

## 2022-10-11 PROCEDURE — 1159F PR MEDICATION LIST DOCUMENTED IN MEDICAL RECORD: ICD-10-PCS | Mod: CPTII,S$GLB,, | Performed by: OBSTETRICS & GYNECOLOGY

## 2022-10-11 PROCEDURE — 99999 PR PBB SHADOW E&M-EST. PATIENT-LVL III: ICD-10-PCS | Mod: PBBFAC,,, | Performed by: OBSTETRICS & GYNECOLOGY

## 2022-10-11 PROCEDURE — 3074F SYST BP LT 130 MM HG: CPT | Mod: CPTII,S$GLB,, | Performed by: OBSTETRICS & GYNECOLOGY

## 2022-10-11 PROCEDURE — 3078F DIAST BP <80 MM HG: CPT | Mod: CPTII,S$GLB,, | Performed by: OBSTETRICS & GYNECOLOGY

## 2022-10-11 PROCEDURE — 99395 PR PREVENTIVE VISIT,EST,18-39: ICD-10-PCS | Mod: S$GLB,,, | Performed by: OBSTETRICS & GYNECOLOGY

## 2022-10-11 PROCEDURE — 1160F PR REVIEW ALL MEDS BY PRESCRIBER/CLIN PHARMACIST DOCUMENTED: ICD-10-PCS | Mod: CPTII,S$GLB,, | Performed by: OBSTETRICS & GYNECOLOGY

## 2022-10-11 RX ORDER — NORETHINDRONE ACETATE AND ETHINYL ESTRADIOL 1MG-20(21)
1 KIT ORAL DAILY
Qty: 84 TABLET | Refills: 3 | Status: SHIPPED | OUTPATIENT
Start: 2022-10-11 | End: 2023-10-19 | Stop reason: SDUPTHER

## 2022-10-13 NOTE — PROGRESS NOTES
SUBJECTIVE:   32 y.o. female   for routine gyn exam. Patient's last menstrual period was 2022..  She has no unusual complaints. Desires refill of OCP.         Past Medical History:   Diagnosis Date    Anxiety disorder      Past Surgical History:   Procedure Laterality Date    NO PAST SURGERIES       Social History     Socioeconomic History    Marital status: Significant Other   Occupational History    Occupation:    Tobacco Use    Smoking status: Never    Smokeless tobacco: Never   Substance and Sexual Activity    Alcohol use: Yes     Alcohol/week: 2.0 standard drinks     Types: 2 Standard drinks or equivalent per week     Comment: socially    Drug use: No    Sexual activity: Yes     Partners: Male     Birth control/protection: OCP     Family History   Problem Relation Age of Onset    Heart disease Father 67        CAD--MI    Breast cancer Neg Hx     Cancer Neg Hx     Colon cancer Neg Hx     Diabetes Neg Hx     Eclampsia Neg Hx     Ovarian cancer Neg Hx      labor Neg Hx     Hypertension Neg Hx     Miscarriages / Stillbirths Neg Hx     Stroke Neg Hx      OB History    Para Term  AB Living   0         0   SAB IAB Ectopic Multiple Live Births           0         Current Outpatient Medications   Medication Sig Dispense Refill    naproxen (NAPROSYN) 500 MG tablet Take 1 tablet (500 mg total) by mouth 2 (two) times daily. 60 tablet 1    norethindrone-ethinyl estradiol (BLISOVI FE 1/20, 28,) 1 mg-20 mcg (21)/75 mg (7) per tablet Take 1 tablet by mouth once daily. 84 tablet 3    sertraline (ZOLOFT) 25 MG tablet Take 1 tablet (25 mg total) by mouth once daily. 30 tablet 11     No current facility-administered medications for this visit.     Allergies: Patient has no known allergies.     ROS:  Constitutional: no weight loss, weight gain, fever, fatigue  Eyes:  No vision changes, glasses/contacts  ENT/Mouth: No ulcers, sinus problems, ears ringing, headache  Cardiovascular: No  "inability to lie flat, chest pain, exercise intolerance, swelling, heart palpitations  Respiratory: No wheezing, coughing blood, shortness of breath, or cough  Gastrointestinal: No diarrhea, bloody stool, nausea/vomiting, constipation, gas, hemorrhoids  Genitourinary: No blood in urine, painful urination, urgency of urination, frequency of urination, incomplete emptying, incontinence, abnormal bleeding, painful periods, heavy periods, vaginal discharge, vaginal odor, painful intercourse, sexual problems, bleeding after intercourse.  Musculoskeletal: No muscle weakness  Skin/Breast: No painful breasts, nipple discharge, masses, rash, ulcers  Neurological: No passing out, seizures, numbness, headache  Endocrine: No diabetes, hypothyroid, hyperthyroid, hot flashes, hair loss, abnormal hair growth, acne  Psychiatric: No depression, crying  Hematologic: No bruises, bleeding, swollen lymph nodes, anemia.      OBJECTIVE:   The patient appears well, alert, oriented x 3, in no distress.  /60 (BP Location: Right arm, Patient Position: Sitting, BP Method: Medium (Manual))   Ht 5' 2" (1.575 m)   Wt 54.4 kg (119 lb 14.9 oz)   LMP 09/26/2022   BMI 21.94 kg/m²   NECK: no thyromegaly, trachea midline  SKIN: no acne, striae, hirsutism  CHEST: CTAB  CV: RRR  BREAST EXAM: breasts appear normal, no suspicious masses, no skin or nipple changes or axillary nodes  ABDOMEN: no hernias, masses, or hepatosplenomegaly  GENITALIA: normal external genitalia, no erythema, no discharge  URETHRA: normal urethra, normal urethral meatus  VAGINA: Normal  CERVIX: no lesions or cervical motion tenderness  UTERUS: normal size, contour, position, consistency, mobility, non-tender  ADNEXA: no mass, fullness, tenderness      ASSESSMENT:   1. Well female exam with routine gynecological exam        2. Encounter for other contraceptive management  norethindrone-ethinyl estradiol (BLISOVI FE 1/20, 28,) 1 mg-20 mcg (21)/75 mg (7) per tablet    "       PLAN:   Orders Placed This Encounter    norethindrone-ethinyl estradiol (BLISOVI FE 1/20, 28,) 1 mg-20 mcg (21)/75 mg (7) per tablet     Discussed healthy lifestyle including regular exercise, healthy eating, etc.  Return to clinic in 1 year

## 2022-12-12 ENCOUNTER — OFFICE VISIT (OUTPATIENT)
Dept: URGENT CARE | Facility: CLINIC | Age: 32
End: 2022-12-12
Payer: COMMERCIAL

## 2022-12-12 VITALS
BODY MASS INDEX: 21.9 KG/M2 | HEART RATE: 68 BPM | OXYGEN SATURATION: 98 % | SYSTOLIC BLOOD PRESSURE: 111 MMHG | RESPIRATION RATE: 16 BRPM | TEMPERATURE: 98 F | HEIGHT: 62 IN | WEIGHT: 119 LBS | DIASTOLIC BLOOD PRESSURE: 72 MMHG

## 2022-12-12 DIAGNOSIS — J02.9 SORE THROAT: Primary | ICD-10-CM

## 2022-12-12 LAB
CTP QC/QA: YES
CTP QC/QA: YES
MOLECULAR STREP A: NEGATIVE
POC MOLECULAR INFLUENZA A AGN: NEGATIVE
POC MOLECULAR INFLUENZA B AGN: NEGATIVE

## 2022-12-12 PROCEDURE — 3008F BODY MASS INDEX DOCD: CPT | Mod: CPTII,S$GLB,, | Performed by: FAMILY MEDICINE

## 2022-12-12 PROCEDURE — 1159F PR MEDICATION LIST DOCUMENTED IN MEDICAL RECORD: ICD-10-PCS | Mod: CPTII,S$GLB,, | Performed by: FAMILY MEDICINE

## 2022-12-12 PROCEDURE — 99213 PR OFFICE/OUTPT VISIT, EST, LEVL III, 20-29 MIN: ICD-10-PCS | Mod: S$GLB,,, | Performed by: FAMILY MEDICINE

## 2022-12-12 PROCEDURE — 3074F SYST BP LT 130 MM HG: CPT | Mod: CPTII,S$GLB,, | Performed by: FAMILY MEDICINE

## 2022-12-12 PROCEDURE — 1159F MED LIST DOCD IN RCRD: CPT | Mod: CPTII,S$GLB,, | Performed by: FAMILY MEDICINE

## 2022-12-12 PROCEDURE — 3008F PR BODY MASS INDEX (BMI) DOCUMENTED: ICD-10-PCS | Mod: CPTII,S$GLB,, | Performed by: FAMILY MEDICINE

## 2022-12-12 PROCEDURE — 87502 INFLUENZA DNA AMP PROBE: CPT | Mod: QW,S$GLB,, | Performed by: FAMILY MEDICINE

## 2022-12-12 PROCEDURE — 99213 OFFICE O/P EST LOW 20 MIN: CPT | Mod: S$GLB,,, | Performed by: FAMILY MEDICINE

## 2022-12-12 PROCEDURE — 1160F PR REVIEW ALL MEDS BY PRESCRIBER/CLIN PHARMACIST DOCUMENTED: ICD-10-PCS | Mod: CPTII,S$GLB,, | Performed by: FAMILY MEDICINE

## 2022-12-12 PROCEDURE — 87651 POCT STREP A MOLECULAR: ICD-10-PCS | Mod: QW,S$GLB,, | Performed by: FAMILY MEDICINE

## 2022-12-12 PROCEDURE — 3078F PR MOST RECENT DIASTOLIC BLOOD PRESSURE < 80 MM HG: ICD-10-PCS | Mod: CPTII,S$GLB,, | Performed by: FAMILY MEDICINE

## 2022-12-12 PROCEDURE — 87651 STREP A DNA AMP PROBE: CPT | Mod: QW,S$GLB,, | Performed by: FAMILY MEDICINE

## 2022-12-12 PROCEDURE — 3074F PR MOST RECENT SYSTOLIC BLOOD PRESSURE < 130 MM HG: ICD-10-PCS | Mod: CPTII,S$GLB,, | Performed by: FAMILY MEDICINE

## 2022-12-12 PROCEDURE — 3078F DIAST BP <80 MM HG: CPT | Mod: CPTII,S$GLB,, | Performed by: FAMILY MEDICINE

## 2022-12-12 PROCEDURE — 87502 POCT INFLUENZA A/B MOLECULAR: ICD-10-PCS | Mod: QW,S$GLB,, | Performed by: FAMILY MEDICINE

## 2022-12-12 PROCEDURE — 1160F RVW MEDS BY RX/DR IN RCRD: CPT | Mod: CPTII,S$GLB,, | Performed by: FAMILY MEDICINE

## 2022-12-12 NOTE — PROGRESS NOTES
"Subjective:       Patient ID: Daphne Sullivan is a 32 y.o. female.    Vitals:  height is 5' 2" (1.575 m) and weight is 54 kg (119 lb). Her temperature is 97.5 °F (36.4 °C). Her blood pressure is 111/72 and her pulse is 68. Her respiration is 16 and oxygen saturation is 98%.     Chief Complaint: Sore Throat    Patient not having any symptoms other then her throat hurting and this started this morning.     Sore Throat   This is a new problem. The current episode started today. The problem has been unchanged. There has been no fever. The pain is at a severity of 5/10. The pain is moderate. Associated symptoms include swollen glands and trouble swallowing. Pertinent negatives include no abdominal pain, congestion, coughing, diarrhea, drooling, ear discharge, ear pain, headaches, hoarse voice, plugged ear sensation, neck pain, shortness of breath, stridor or vomiting.     HENT:  Positive for sore throat and trouble swallowing. Negative for ear pain, ear discharge, drooling and congestion.    Neck: Negative for neck pain.   Respiratory:  Negative for cough, shortness of breath and stridor.    Gastrointestinal:  Negative for abdominal pain, vomiting and diarrhea.   Neurological:  Negative for headaches.     Objective:      Physical Exam   Constitutional: She is oriented to person, place, and time. She appears well-developed. She is cooperative.  Non-toxic appearance. She does not appear ill. No distress.   HENT:   Head: Normocephalic and atraumatic.   Ears:   Right Ear: Hearing, tympanic membrane, external ear and ear canal normal.   Left Ear: Hearing, tympanic membrane, external ear and ear canal normal.   Nose: Nose normal. No mucosal edema, rhinorrhea or nasal deformity. No epistaxis. Right sinus exhibits no maxillary sinus tenderness and no frontal sinus tenderness. Left sinus exhibits no maxillary sinus tenderness and no frontal sinus tenderness.   Mouth/Throat: Uvula is midline and mucous membranes are normal. " Mucous membranes are moist. No trismus in the jaw. Normal dentition. No uvula swelling. Posterior oropharyngeal erythema (minor erythema) present. No oropharyngeal exudate or posterior oropharyngeal edema.   Eyes: Conjunctivae and lids are normal. Pupils are equal, round, and reactive to light. No scleral icterus. Extraocular movement intact   Neck: Trachea normal and phonation normal. Neck supple. No edema present. No erythema present. No neck rigidity present.   Cardiovascular: Normal rate, regular rhythm, normal heart sounds and normal pulses.   Pulmonary/Chest: Effort normal and breath sounds normal. No respiratory distress. She has no decreased breath sounds. She has no rhonchi.   Abdominal: Normal appearance.   Musculoskeletal: Normal range of motion.         General: No deformity. Normal range of motion.   Lymphadenopathy:     She has no cervical adenopathy.   Neurological: She is alert and oriented to person, place, and time. She exhibits normal muscle tone. Coordination normal.   Skin: Skin is warm, dry, intact, not diaphoretic and not pale.   Psychiatric: Her speech is normal and behavior is normal. Judgment and thought content normal.   Nursing note and vitals reviewed.      Assessment:       1. Sore throat          Plan:         Sore throat  -     POCT Influenza A/B Molecular  -     POCT Strep A, Molecular  -     (Magic mouthwash) 1:1:1 diphenhydrAMINE(Benadryl) 12.5mg/5ml liq, aluminum & magnesium hydroxide-simethicone (Maalox), LIDOcaine viscous 2%; Swish and spit 10 mLs every 4 (four) hours as needed (sore throat).  Dispense: 360 mL; Refill: 0    Pt or guardian provided educational materials and instructions regarding their visit diagnosis.

## 2023-01-10 NOTE — PROGRESS NOTES
[facility] - NEUROLOGY [address]TH [address] [facility], [place]    Date: 1/11/23  Patient Name: Daphne Sullivan   MRN: [id number]   PCP: Indigo Meneses  Referring Provider: Sravan Howell MD    Assessment:   Daphne Sullivan is a 32 y.o. female presenting as initial evaluation for worsening headache with new features in the last year. She describes a severe 10/10 pain that starts at right cervical region and radiates to R frontal region. Triggers are R hamstring/back stiffness which she has been experiencing as well. Pain last 6 hours and relives with Excedrin migraine. No history of neck trauma. On exam there is tenderness to palpation at trapezius and near R NELSY region.   Overall presentation suggestive of cervicogenic migraine triggered by neck stiffness.  Plan:   -- Given new onset of headache with new features will get MRI brain w/o contrast along with MRI cervical spine to evaluate for cervical pathology   -- PT/OT for cervicogenic migraine, stretching exercises   -- Start low dose gabapentin to help with pain,300mg HS   -- Follow up in 6 months     Problem List Items Addressed This Visit          Neuro    Chronic tension-type headache, not intractable    Overview     Right greater than left sided          Other Visit Diagnoses       Cervicalgia    -  Primary    Relevant Medications    gabapentin (NEURONTIN) 300 MG capsule    Other Relevant Orders    MRI Brain Without Contrast    MRI Cervical Spine Without Contrast    Ambulatory referral/consult to Physical/Occupational Therapy          Samia Fabian MD   Neurology Resident, PGY3  [facility] [address]       Patient note was created using MModal Dictation.  Any errors in syntax or even information may not have been identified and edited on initial review prior to signing this note.  Subjective:   Patient seen in consultation at the request of Sravan Howell MD for the evaluation of headache.  A copy of this note will be sent to the referring physician.        HPI:   Daphne Sullivan is a 32 y.o. female with history of tension headaches presenting for initial evaluation for worsening headaches for 1  year. Description of pain: throbbing, sharp, squeezing pain, unilateral in the right frontal area that starts off from right cervical region. Duration of individual headaches: 6 hour(s), frequency several times per month (1-2x). Pain is 10/10 on onset. It can wake her up from sleep. Associated symptoms: stiff neck. Pain relief: excedrin, stretching and hot/ice helps. Has been going to the chiropractor. Precipitating factors: body tension, she refers yariel she has been having some hamstring tightness and when this happens her entire right side becomes tense and that triggers her headache. She has been working w PT for stretching exercises. She denies a history of recent head injury. Concussion when 12 y.o, no LOC.   Prior neurological history: negative for brain neoplasms, seizure disorders, AVM (arteriovenous malformation), meningitis, major head injuries, neurosurgical procedures.  Neurologic Review of Systems - no TIA or stroke-like symptoms, no amaurosis, diplopia, abnormal speech, unilateral numbness or weakness.    Has tried naproxen and excedirn and tylenol  Scheduled Meds:  Continuous Infusions:  PRN Meds:      PAST MEDICAL HISTORY:  Past Medical History:   Diagnosis Date    Anxiety disorder        PAST SURGICAL HISTORY:  Past Surgical History:   Procedure Laterality Date    NO PAST SURGERIES         CURRENT MEDS:  Current Outpatient Medications   Medication Sig Dispense Refill    norethindrone-ethinyl estradiol (BLISOVI FE 1/20, 28,) 1 mg-20 mcg (21)/75 mg (7) per tablet Take 1 tablet by mouth once daily. 84 tablet 3    sertraline (ZOLOFT) 25 MG tablet Take 1 tablet (25 mg total) by mouth once daily. 30 tablet 11    (Magic mouthwash) 1:1:1 diphenhydrAMINE(Benadryl) 12.5mg/5ml liq, aluminum &  "magnesium hydroxide-simethicone (Maalox), LIDOcaine viscous 2% Swish and spit 10 mLs every 4 (four) hours as needed (sore throat). 360 mL 0    gabapentin (NEURONTIN) 300 MG capsule Take 1 capsule (300 mg total) by mouth 3 (three) times daily. Take 1 at night, if tolerating and not excessively drowsy can take 2-3 times a day 90 capsule 3     No current facility-administered medications for this visit.       ALLERGIES:  Review of patient's allergies indicates:  No Known Allergies    FAMILY HISTORY:  Family History   Problem Relation Age of Onset    Heart disease Father 67        CAD--MI    Breast cancer Neg Hx     Cancer Neg Hx     Colon cancer Neg Hx     Diabetes Neg Hx     Eclampsia Neg Hx     Ovarian cancer Neg Hx      labor Neg Hx     Hypertension Neg Hx     Miscarriages / Stillbirths Neg Hx     Stroke Neg Hx        SOCIAL HISTORY:  Social History     Tobacco Use    Smoking status: Never    Smokeless tobacco: Never   Substance Use Topics    Alcohol use: Yes     Alcohol/week: 2.0 standard drinks     Types: 2 Standard drinks or equivalent per week     Comment: socially    Drug use: No       Review of Systems:  12 system review of systems is negative except for the symptoms mentioned in HPI.      Objective:     Vitals:    23 0949   BP: 125/86   Pulse: 65   Weight: 53 kg (116 lb 13.5 oz)   Height: 5' 2" (1.575 m)     General: NAD, well nourished   Eyes: no tearing, discharge, no erythema   ENT: moist mucous membranes of the oral cavity, nares patent    Neck: Supple, full range of motion  Cardiovascular: Warm and well perfused, pulses equal and symmetrical  Lungs: Normal work of breathing, normal chest wall excursions  Skin: No rash, lesions, or breakdown on exposed skin  Psychiatry: Mood and affect are appropriate   Abdomen: soft, non tender, non distended  Extremeties: No cyanosis, clubbing or edema.    Neurological   MENTAL STATUS: Alert and oriented to person, place, and time. Attention and " concentration within normal limits. Speech without dysarthria, able to name and repeat without difficulty. Recent and remote memory within normal limits   CRANIAL NERVES: Visual fields intact. PERRL. EOMI. Facial sensation intact. Face symmetrical. Hearing grossly intact. Full shoulder shrug bilaterally. Tongue protrudes midline Tenderness to palpation at R cervical region, near trapezius and NELSY distribution   SENSORY: Sensation is intact to light touch throughout.  Joint position perception intact. Negative Romberg.   MOTOR: Normal bulk and tone. No pronator drift.  5/5 deltoid, biceps, triceps, interosseous, hand  bilaterally. 5/5 iliopsoas, knee extension/flexion, foot dorsi/plantarflexion bilaterally.    REFLEXES: Symmetric and 2+ throughout. Toes down going bilaterally.   CEREBELLAR/COORDINATION/GAIT: Gait steady with normal arm swing and stride length.  Heel to shin intact. Finger to nose intact. Normal rapid alternating movements.

## 2023-01-11 ENCOUNTER — OFFICE VISIT (OUTPATIENT)
Dept: NEUROLOGY | Facility: CLINIC | Age: 33
End: 2023-01-11
Payer: COMMERCIAL

## 2023-01-11 VITALS
HEART RATE: 65 BPM | HEIGHT: 62 IN | WEIGHT: 116.88 LBS | DIASTOLIC BLOOD PRESSURE: 86 MMHG | SYSTOLIC BLOOD PRESSURE: 125 MMHG | BODY MASS INDEX: 21.51 KG/M2

## 2023-01-11 DIAGNOSIS — G44.229 CHRONIC TENSION-TYPE HEADACHE, NOT INTRACTABLE: ICD-10-CM

## 2023-01-11 DIAGNOSIS — M54.2 CERVICALGIA: Primary | ICD-10-CM

## 2023-01-11 PROCEDURE — 1160F RVW MEDS BY RX/DR IN RCRD: CPT | Mod: CPTII,S$GLB,, | Performed by: STUDENT IN AN ORGANIZED HEALTH CARE EDUCATION/TRAINING PROGRAM

## 2023-01-11 PROCEDURE — 3079F DIAST BP 80-89 MM HG: CPT | Mod: CPTII,S$GLB,, | Performed by: STUDENT IN AN ORGANIZED HEALTH CARE EDUCATION/TRAINING PROGRAM

## 2023-01-11 PROCEDURE — 1159F PR MEDICATION LIST DOCUMENTED IN MEDICAL RECORD: ICD-10-PCS | Mod: CPTII,S$GLB,, | Performed by: STUDENT IN AN ORGANIZED HEALTH CARE EDUCATION/TRAINING PROGRAM

## 2023-01-11 PROCEDURE — 99204 OFFICE O/P NEW MOD 45 MIN: CPT | Mod: S$GLB,,, | Performed by: STUDENT IN AN ORGANIZED HEALTH CARE EDUCATION/TRAINING PROGRAM

## 2023-01-11 PROCEDURE — 99999 PR PBB SHADOW E&M-EST. PATIENT-LVL IV: CPT | Mod: PBBFAC,,, | Performed by: STUDENT IN AN ORGANIZED HEALTH CARE EDUCATION/TRAINING PROGRAM

## 2023-01-11 PROCEDURE — 3074F SYST BP LT 130 MM HG: CPT | Mod: CPTII,S$GLB,, | Performed by: STUDENT IN AN ORGANIZED HEALTH CARE EDUCATION/TRAINING PROGRAM

## 2023-01-11 PROCEDURE — 1160F PR REVIEW ALL MEDS BY PRESCRIBER/CLIN PHARMACIST DOCUMENTED: ICD-10-PCS | Mod: CPTII,S$GLB,, | Performed by: STUDENT IN AN ORGANIZED HEALTH CARE EDUCATION/TRAINING PROGRAM

## 2023-01-11 PROCEDURE — 99204 PR OFFICE/OUTPT VISIT, NEW, LEVL IV, 45-59 MIN: ICD-10-PCS | Mod: S$GLB,,, | Performed by: STUDENT IN AN ORGANIZED HEALTH CARE EDUCATION/TRAINING PROGRAM

## 2023-01-11 PROCEDURE — 99999 PR PBB SHADOW E&M-EST. PATIENT-LVL IV: ICD-10-PCS | Mod: PBBFAC,,, | Performed by: STUDENT IN AN ORGANIZED HEALTH CARE EDUCATION/TRAINING PROGRAM

## 2023-01-11 PROCEDURE — 3008F BODY MASS INDEX DOCD: CPT | Mod: CPTII,S$GLB,, | Performed by: STUDENT IN AN ORGANIZED HEALTH CARE EDUCATION/TRAINING PROGRAM

## 2023-01-11 PROCEDURE — 1159F MED LIST DOCD IN RCRD: CPT | Mod: CPTII,S$GLB,, | Performed by: STUDENT IN AN ORGANIZED HEALTH CARE EDUCATION/TRAINING PROGRAM

## 2023-01-11 PROCEDURE — 3079F PR MOST RECENT DIASTOLIC BLOOD PRESSURE 80-89 MM HG: ICD-10-PCS | Mod: CPTII,S$GLB,, | Performed by: STUDENT IN AN ORGANIZED HEALTH CARE EDUCATION/TRAINING PROGRAM

## 2023-01-11 PROCEDURE — 3008F PR BODY MASS INDEX (BMI) DOCUMENTED: ICD-10-PCS | Mod: CPTII,S$GLB,, | Performed by: STUDENT IN AN ORGANIZED HEALTH CARE EDUCATION/TRAINING PROGRAM

## 2023-01-11 PROCEDURE — 3074F PR MOST RECENT SYSTOLIC BLOOD PRESSURE < 130 MM HG: ICD-10-PCS | Mod: CPTII,S$GLB,, | Performed by: STUDENT IN AN ORGANIZED HEALTH CARE EDUCATION/TRAINING PROGRAM

## 2023-01-11 RX ORDER — GABAPENTIN 300 MG/1
300 CAPSULE ORAL 3 TIMES DAILY
Qty: 90 CAPSULE | Refills: 3 | Status: SHIPPED | OUTPATIENT
Start: 2023-01-11 | End: 2023-05-11

## 2023-02-01 ENCOUNTER — CLINICAL SUPPORT (OUTPATIENT)
Dept: REHABILITATION | Facility: OTHER | Age: 33
End: 2023-02-01
Payer: COMMERCIAL

## 2023-02-01 DIAGNOSIS — M54.2 CERVICALGIA: ICD-10-CM

## 2023-02-01 DIAGNOSIS — M54.9 CHRONIC NECK AND BACK PAIN: ICD-10-CM

## 2023-02-01 DIAGNOSIS — M54.2 CHRONIC NECK AND BACK PAIN: ICD-10-CM

## 2023-02-01 DIAGNOSIS — G89.29 CHRONIC NECK AND BACK PAIN: ICD-10-CM

## 2023-02-01 DIAGNOSIS — G44.219 EPISODIC TENSION-TYPE HEADACHE, NOT INTRACTABLE: ICD-10-CM

## 2023-02-01 PROCEDURE — 97140 MANUAL THERAPY 1/> REGIONS: CPT | Mod: PN

## 2023-02-01 PROCEDURE — 97161 PT EVAL LOW COMPLEX 20 MIN: CPT | Mod: PN

## 2023-02-01 PROCEDURE — 97110 THERAPEUTIC EXERCISES: CPT | Mod: PN

## 2023-02-09 PROBLEM — G89.29 CHRONIC NECK AND BACK PAIN: Status: ACTIVE | Noted: 2023-02-09

## 2023-02-09 PROBLEM — M54.2 CHRONIC NECK AND BACK PAIN: Status: ACTIVE | Noted: 2023-02-09

## 2023-02-09 PROBLEM — G44.219 EPISODIC TENSION-TYPE HEADACHE, NOT INTRACTABLE: Status: ACTIVE | Noted: 2023-02-09

## 2023-02-09 PROBLEM — M54.9 CHRONIC NECK AND BACK PAIN: Status: ACTIVE | Noted: 2023-02-09

## 2023-02-09 NOTE — PLAN OF CARE
"[facility]   Physical Therapy Initial Evaluation     Date: 2/1/2023   Name: Daphne Sullivan  Clinic Number: [id number]    Therapy Diagnosis:   Encounter Diagnoses   Name Primary?    Cervicalgia     Episodic tension-type headache, not intractable     Chronic neck and back pain      Physician: Samia Malave *    Physician Orders: PT Eval and Treat   Medical Diagnosis from Referral: M54.2 (ICD-10-CM) - Cervicalgia   Evaluation Date: 2/1/2023  Authorization Period Expiration: 12/31/2023   Plan of Care Expiration: 5/3/2023  Progress Note Due: 3/3/2023  Visit # / Visits authorized: 1/ 1   FOTO: 1/3    Precautions: Standard     Time In: 2:30  Time Out: 3:20  Total Appointment Time (timed & untimed codes): 50 minutes      SUBJECTIVE     Date of onset: chronic, 1 year ago    History of current condition - Daphne reports: Chronic hx of back and R hamstring pain, that radiates up into the back of the head (R) and right neck. New onset of headaches that started 1 year ago without PAM or trauma. "I get [the headaches] really bad about 1/mo. The headaches wrap around my head, and then worsen my neck pain." Pt reports R hamstring pain/tightness trigger onset of headache. Reports that when she stretches her hamstring, the pain in the forehead and neck is improved.    Per pt, MD told her it was a tension type HA. She reports a low level headache at least 1x/week that starts in the right neck and R head (front), which worsens to 10/10 pain with prolonged sitting (ie: getting her hair cut), improved with feet on ground.    Hx R ankle sprain/injury, chronic tension in neck and shoulders, right low back and hamstring pain. Pt denies drop foot, change of B/B control, saddle paresthesias, unexplained weight gain/loss, fever, chills or night sweats.       Falls: none    Imaging, bone scan films, LSP, 10/2022: No acute osseous abnormality seen.    Prior Therapy: yes - 2x in 2021 for ankle pain, back " pain, HAs at [facility] - did trial of PT (2 visits) and did not continue after that. pt self reports improvements in sx.  Social History: apartment, lives alone  Occupation: realestate () - driving, CPU work   Recreational: soccer, corporate wellness with  in the building (3x/week, 45 min - full body)  Prior Level of Function: independent  Current Level of Function: intermittent pain and difficulty with all functional activities    Pain:  Current 5/10, worst 10/10, best 0/10   Location: bilateral neck (R>L), occasionally back and RLE  Description: Tight, pressure  Aggravating Factors: wakes her up at night, AM, usually happen at night, being stuck in 1 position for too long (sitting +/- crossed legs > standing)  Easing Factors: neck and HS stretch, theragun, hot shower or ice, OTC meds (excedrine), chiro adjustments to low back and neck, movement overall makes it feels better    Patients goals: resolve pain/headaches     Medical History:   Past Medical History:   Diagnosis Date    Anxiety disorder        Surgical History:   Daphne Sullivan  has a past surgical history that includes No past surgeries.    Medications:   Daphne has a current medication list which includes the following prescription(s): diphenhydramine hcl, gabapentin, norethindrone-ethinyl estradiol, and sertraline.    Allergies:   Review of patient's allergies indicates:  No Known Allergies       OBJECTIVE     Posture Alignment: mild FHP, rounded shoulders R>L, decreased lumbar curve  Sensory deficit: none  Reflexes:    Left Right   Patella Tendon 1+ 2+   Achilles Tendon 1+ 2+   Babinski  (--) (--)   Clonus (--) (--)         Selective Functional Movement Assessment:   Central Left Right   Active Cervical Flexion DP - max brewster, chin 4 fingers to chest, pain on R, flat mid CSP curve, feels pulling down R scapula --- ---   Active Cervical Extension DP - min brewster, mild pain on R neck --- ---   Cervical Rotation Bend --- DP -  "mod brewster <50 deg DP - mod brewster <50 deg   Upper Extremity Pattern 1 (IR) --- DN - mild scap winging DN - mild scap winging R>L   Upper Extremity Pattern 2 (ER) --- DN DN   Multisegmental Flexion FP - fingertips 2" above toes, notes pulling pain in R low back, down RLE --- ---   Multisegmental Exetnsion DN - decreased lumbar curve, fulcrum at mid LSP, decreased anterior WS of pelvis --- ---   Multisegmental Rotation --- FN DN - min brewster in standing   Single Leg Stance --- FN DN - mod sway, contralat pelvic drop   Overhead Deep Squat DN - slight increase in trunk flex, toe out on R --- ---     Abbreviations:  FN - functional non painful  FP - functional painful  DP - dysfunctional painful  DN - dysfunctional non painful        Upper quarter eval    CERVICAL SPINE AROM: see above  Deep Neck Flexor: poor  Shoulder ROM: WNL jacque  Elbow ROM: WNL jacque      UPPER EXTREMITY STRENGTH:   Left Right   Shoulder Flexion 5/5 5/5   Shoulder Abduction 5/5 5/5   Shoulder Internal Rotation 5/5 5/5   Shoulder External Rotation 5/5 5/5   Upper trap 5/5 5/5   Mid trap 4/5 4/5   Lower trap 4-/5 4-/5   Serratus anterior 4/5 4/5    WNL WNL       Flexibility:   Pectoralis Major: mod brewster (R)  Pectoralis Minor: mod brewster (R)  Latissimus Dorsi: WNL  Subscapularis: NT  Upper Traps: mod brewster jacque  Levator Scap: mod brewster jacque  Scalenes: mod brewster    Joint Mobility: hypo C0-2, C6-T5    Special Tests:    Clonus: -  Vertebral artery test: -  Alar ligament integrity test: -  Sharps-taras: -  Neural Tension (UE): -      Lower quarter eval    Trunk AROM: see above  Hip ROM: WNL  Knee ROM: WNL    Lower Extremity Strength  Right LE  Left LE    Hip flexion: 4+/5 Hip flexion: 4+/5   Hip extension:  4+/5 Hip extension: 4+/5   Hip abduction: 4+/5 Hip abduction: 4+/5   Hip adduction:  5/5 Hip adduction:  5/5   Hip Internal rotation   5/5 Hip Internal rotation 5/5   Knee Flexion 5/5 Knee Flexion 5/5   Knee Extension 5/5 Knee Extension 5/5     Flexibility:   Efren test: " "NT  Michelle test: NT  Hamstring (SLR): hypo RLE <80 deg    Joint Mobility: NT TSP/LSP    Special Tests:   Quadrant test: -  ASLR: + R  Slump test: + R  Heel walks: -  Toe walks: -      Limitation/Restriction for FOTO neck Survey    Therapist reviewed FOTO scores for Daphne Sullivan on 2/1/2023.   FOTO documents entered into [employer] - see Media section.    Limitation Score: 40%         TREATMENT     Total Treatment time (time-based codes) separate from Evaluation: 23 minutes      Daphne received the treatments listed below:      therapeutic exercises to develop strength, endurance, ROM, flexibility, posture, and core stabilization for 15 minutes including:  Self SNAG  5 x 5"  SNAG for Headache 5 x 5"  Still point inducer x 3'  Patient edu x 5'    manual therapy techniques: Myofacial release and Soft tissue Mobilization were applied to the: CSP for 8 minutes, including:  Suboccipitial inhibition, C/R for suboccipitals, C2-7 DS/US, cervical traction (manual).  Pt would be an excellent candidate for dry needling next visit to head, neck, low back    neuromuscular re-education activities to improve: Balance, Coordination, Kinesthetic, Sense, Proprioception, and Posture for 00 minutes. The following activities were included:  None today.  Initiate DNF and TA activation series next visit.    therapeutic activities to improve functional performance for 00  minutes, including:  None today.  Add next visit - rows, SALPD      PATIENT EDUCATION AND HOME EXERCISES     Education provided:   - Patient educated regarding pathogenesis, diagnosis, protocol, prognosis, POC, and HEP, including use of visual assistance for understanding of anatomy and dysfunction. Written Home Exercises Provided with written and verbal instructions for frequency and duration of the following exercises: see list above. Pt educated on HEP and activity modifications to reduce c/o pain and improve overall function.  -Patient received education regarding " proper posture and body mechanics. Archana teressa tried, recommended, and purchase information was provided. Heavy emphasis on upright posture while sitting to improve spinal alignment, restore lumbar curvature, and reduce stress/strain on cervical spine. Continued to recommend pt perform HEP intermittently throughout the day to reduce c/o stiffness and pain, particularly cervical retractions while sitting in car with TTC from headrest. Pt also educated on use of modalities prn to reduce c/o pain and dysfunction. Daphne aishwarya good understanding of the education provided. Daphne  demonstrated good return demonstration of activities.    - Pt also educated on use of modalities prn to reduce c/o pain and dysfunction.       Written Home Exercises Provided: yes. Exercises were reviewed and Daphne was able to demonstrate them prior to the end of the session.  Daphne demonstrated good  understanding of the education provided. See EMR under Patient Instructions for exercises provided during therapy sessions.    ASSESSMENT     Daphne is a 32 y.o. female referred to outpatient Physical Therapy with a medical diagnosis of cervicalgia. Patient presents with marked limitations in ROM, joint and myofascial mobility, flexibility, strength, postural awareness/endurance, motor control and coordination. S/s associated with referring diagnosis, with HA s/s associated with tension type HA as well as possible neural tension as pt reports correlation with increased sx with LE movement and pain. Impairments limit pt with all functional activities including work, recreation, walking, sitting.      Patient prognosis is Good.   Patient will benefit from skilled outpatient Physical Therapy to address the deficits stated above and in the chart below, provide patient /family education, and to maximize patientt's level of independence.     Plan of care discussed with patient: Yes  Patient's spiritual, cultural and educational needs considered and  patient is agreeable to the plan of care and goals as stated below:     Anticipated Barriers for therapy: standard, chronicity of sx.    Medical Necessity is demonstrated by the following  History  Co-morbidities and personal factors that may impact the plan of care Co-morbidities:   none     Personal Factors:   no deficits       low   Examination  Body Structures and Functions, activity limitations and participation restrictions that may impact the plan of care Body Regions:   back  lower extremities  Trunk  Head and neck     Body Systems:    gross symmetry  ROM  strength  balance  Posture, flexibility, joint mobility  motor control     Participation Restrictions:   Running, pivoting, cutting, squatting, prolonged sitting with work activities, sleeping     Activity limitations:   Learning and applying knowledge  no deficits     General Tasks and Commands  no deficits     Communication  no deficits     Mobility  no deficits     Self care  no deficits     Domestic Life  no deficits     Interactions/Relationships  no deficits     Life Areas  no deficits     Community and Social Life  community life  recreation and leisure             high   Clinical Presentation stable and uncomplicated low   Decision Making/ Complexity Score: low         Goals: updated 2/1/23  Short Term Goals (6 Weeks):   1. Pt will report 25% reduction in head and neck pain for ease with ADL's  2. PT will demonstrate 1/3 MMT improvement in periscapular strength for ease with upright posture  3. Pt will demonstrate improved cervical spine ROM in all directions by 5 degrees for ease with driving to MD appointments    Long Term Goals (12 Weeks):   1. Pt will report being independent with HEP for maintenance of improvements gained during therapy sessions  2. PT will report 50% reduction of head and neck pain for ease with ADLs.   3. Pt will demonstrate full UE and periscapular strength without the provocation of pain for ease with household chores  4.  Pt will demonstrate appropriate upright posture without external cueing for ease with work related activities.       PLAN   Plan of care Certification: 2/1/2023 to 5/3/2023.    Outpatient Physical Therapy 2 times weekly for 12 weeks to include the following interventions: Aquatic Therapy, Cervical/Lumbar Traction, Electrical Stimulation prn with dry needling, Iontophoresis (with dexamethasone prn), Manual Therapy, Moist Heat/ Ice, Neuromuscular Re-ed, Patient Education, Self Care, Therapeutic Activities, and Therapeutic Exercise. Progress HEP towards D/C. Recommend F/U with MD if symptoms worsen or do not resolve. Patient may be seen by a PTA for treatment to carry out their plan of care.  Face-to-face conferences will be held.     Roula Robert, PT      I CERTIFY THE NEED FOR THESE SERVICES FURNISHED UNDER THIS PLAN OF TREATMENT AND WHILE UNDER MY CARE   Physician's comments:     Physician's Signature: ___________________________________________________

## 2023-03-03 ENCOUNTER — CLINICAL SUPPORT (OUTPATIENT)
Dept: REHABILITATION | Facility: OTHER | Age: 33
End: 2023-03-03
Payer: COMMERCIAL

## 2023-03-03 DIAGNOSIS — G44.219 EPISODIC TENSION-TYPE HEADACHE, NOT INTRACTABLE: Primary | ICD-10-CM

## 2023-03-03 DIAGNOSIS — M54.9 CHRONIC NECK AND BACK PAIN: ICD-10-CM

## 2023-03-03 DIAGNOSIS — M54.2 CHRONIC NECK AND BACK PAIN: ICD-10-CM

## 2023-03-03 DIAGNOSIS — G89.29 CHRONIC NECK AND BACK PAIN: ICD-10-CM

## 2023-03-03 PROCEDURE — 97140 MANUAL THERAPY 1/> REGIONS: CPT | Mod: PN

## 2023-03-03 PROCEDURE — 97110 THERAPEUTIC EXERCISES: CPT | Mod: PN

## 2023-03-03 NOTE — PROGRESS NOTES
"[facility]   Physical Therapy Treatment Note     Name: Daphne Dye Sullivan  Clinic Number: [id number]    Therapy Diagnosis:   Encounter Diagnoses   Name Primary?    Episodic tension-type headache, not intractable Yes    Chronic neck and back pain      Physician: Samia Malave *    Visit Date: 3/3/2023    Physician Orders: PT Eval and Treat   Medical Diagnosis from Referral: M54.2 (ICD-10-CM) - Cervicalgia   Evaluation Date: 2/1/2023  Authorization Period Expiration: 12/31/2023   Plan of Care Expiration: 5/3/2023  Progress Note Due: 04/03/2023  Visit # / Visits authorized: 1/20 (+ eval)  FOTO: 1/3  PTA Visit #: 0/5     Precautions: Standard    Time In: 8:15 am  Time Out: 9:13 am  Total Billable Time: 53 minutes      SUBJECTIVE     Pt reports: her back is bothering her a lot today. She states that she played soccer yesterday so she is feeling pretty stiff. She states that she plans to order the still point inducer today   She was compliant with home exercise program.  Response to previous treatment: 1st follow up since initial evaluation   Functional change: 1st follow up since initial evaluation     Pain: 3/10  Location: Low back/right QL     OBJECTIVE     Objective Measures updated at progress report unless specified.       TREATMENT     Daphne received the treatments listed below:      received therapeutic exercises to develop strength and ROM for 45 minutes including:  Self SNAG  5 x 5"  SNAG for Headache 5 x 5"  Still point inducer x 3'  +DKTC x2 minutes  +LTR x 2 minutes  +Left SL QL stretch 3x30" w/ PT overpressure  +SL open book 5x10"  +Tail wags 3x30"  +Cat/camel 10x5'    received the following manual therapy techniques: Soft tissue Mobilization were applied for 10 minutes, including:  +IASTYM cupping to lumbar and thoracic paraspinals   +manual cervical traction       PATIENT EDUCATION AND HOME EXERCISES     Home Exercises Provided and Patient Education Provided "     Education provided:   - Dry needling benefits and expectations     Written Home Exercises Provided: Patient instructed to cont prior HEP. Exercises were reviewed and Daphne was able to demonstrate them prior to the end of the session.  Daphne demonstrated good  understanding of the education provided. See EMR under Patient Instructions for exercises provided during therapy sessions    ASSESSMENT     Shruthi completed today's therapy session well and with no adverse effects. Pt returned to PT stating that her back is bothering her more than her neck. Emphasis placed on low back mobility and flexibility this visit. Pt reports improvements with tightness post manual therapy. Discussed with pt possible plan to initiate dry needling for R QL pain and intermittent headaches. Plan to continue to progress exercises per pts tolerance.     Daphne Is progressing well towards her goals.   Pt prognosis is Good.     Pt will continue to benefit from skilled outpatient physical therapy to address the deficits listed in the problem list box on initial evaluation, provide pt/family education and to maximize pt's level of independence in the home and community environment.     Pt's spiritual, cultural and educational needs considered and pt agreeable to plan of care and goals.     Anticipated barriers to physical therapy: standard, chronicity of sx      Goals: updated 2/1/23  Short Term Goals (6 Weeks):   1. Pt will report 25% reduction in head and neck pain for ease with ADL's  2. PT will demonstrate 1/3 MMT improvement in periscapular strength for ease with upright posture  3. Pt will demonstrate improved cervical spine ROM in all directions by 5 degrees for ease with driving to MD appointments     Long Term Goals (12 Weeks):   1. Pt will report being independent with HEP for maintenance of improvements gained during therapy sessions  2. PT will report 50% reduction of head and neck pain for ease with ADLs.   3. Pt will  demonstrate full UE and periscapular strength without the provocation of pain for ease with household chores  4. Pt will demonstrate appropriate upright posture without external cueing for ease with work related activities.   PLAN     Plan of care Certification: 2/1/2023 to 5/3/2023.       Jen Hudson, PT

## 2023-03-08 NOTE — PROGRESS NOTES
[facility]   Physical Therapy Treatment Note     Name: Daphne Sullivan  Clinic Number: [id number]    Therapy Diagnosis:   No diagnosis found.    Physician: Samia Malave *    Visit Date: 3/10/2023    Physician Orders: PT Eval and Treat   Medical Diagnosis from Referral: M54.2 (ICD-10-CM) - Cervicalgia   Evaluation Date: 2/1/2023  Authorization Period Expiration: 12/31/2023   Plan of Care Expiration: 5/3/2023  Progress Note Due: 04/03/2023  Visit # / Visits authorized: 2/20 (+ eval)  FOTO: 3/10/2023 (2/3)  PTA Visit #: 0/5     Precautions: Standard    Time In: 8:45 am (late arrival)   Time Out: 9:15 am   Total Billable Time: 30 minutes      SUBJECTIVE     Pt reports: she was sore after last session in the low back but that only lasted for that day.  She played soccer and notes she did have a headache afterwards and felt stiff in the neck and low back.  She currently feels more stiff in the neck today.    She was compliant with home exercise program.  Response to previous treatment: no adverse effects  Functional change: none     Pain: 5/10   Location: right side of the neck and low back    OBJECTIVE     Objective Measures updated at progress report unless specified.     Mid traps: right: 3/5; left: 3/5  Rhomboids: right: 3/5; left: 3/5  Lower trap: right: 3/5; left: 3/5  Serratus anterior: right: 4/5; left: 4+/5        CMS Impairment/Limitation/Restriction for FOTO Neck Survey    Therapist reviewed FOTO scores for Daphne Sullivan on 3/10/2023.   FOTO documents entered into [employer] - see Media section.    Limitation Score: 53%  Category: Body Position    Current : CK = at least 40% but < 60% impaired, limited or restricted  Goal: CJ = at least 20% but < 40% impaired, limited or restricted          TREATMENT     Daphne received the treatments listed below:      received therapeutic exercises to develop strength and ROM for 20 minutes including:  Re-assessment  Review and  "education of dry needling purpose, benefits, risks and consent form  Self SNAG  5 x 5"  SNAG for Headache 5 x 5"  Still point inducer x 3'  +DKTC x2 minutes  +LTR x 2 minutes  +Left SL QL stretch 3x30" w/ PT overpressure  +SL open book 5x10"  +Tail wags 3x30"  +Cat/camel 10x5'    received the following manual therapy techniques: Soft tissue Mobilization were applied for 10 minutes, including:  IASTYM cupping to lumbar and CTJ paraspinals   Initiate dry needling at next visit for headaches, neck/thoracic spine and lumbar spine    neuromuscular re-education activities to improve: Balance, Coordination, Kinesthetic, Sense, Proprioception, and Posture for 00 minutes. The following activities were included:  None today.  Initiate DNF and TA activation series next visit.    therapeutic activities to improve functional performance for 00  minutes, including:  None today.  Add next visit - rows, SALPD    PATIENT EDUCATION AND HOME EXERCISES     Home Exercises Provided and Patient Education Provided     Education provided:   - Dry needling benefits and expectations     Written Home Exercises Provided: Patient instructed to cont prior HEP. Exercises were reviewed and Daphne was able to demonstrate them prior to the end of the session.  Daphne demonstrated good  understanding of the education provided. See EMR under Patient Instructions for exercises provided during therapy sessions    ASSESSMENT     At monthly re-assessment patient has made minimal progress although this may be attributed to patient attending 3 visits including IE and today.  Reviewed dry needling consent form and discussed scheduling at least 2 more visits for needling to determine if it is an effective treatment.  Patient verbalized understanding.  All questions and concerns answered during review of dry needling consent form, purpose, benefits and risks with patient requesting to initiate this treatment at next visit. Limited treatment at today's visit due " to late arrival. Plan to initiate dry needling at next visit.     Daphne Is progressing well towards her goals.   Pt prognosis is Good.     Pt will continue to benefit from skilled outpatient physical therapy to address the deficits listed in the problem list box on initial evaluation, provide pt/family education and to maximize pt's level of independence in the home and community environment.     Pt's spiritual, cultural and educational needs considered and pt agreeable to plan of care and goals.     Anticipated barriers to physical therapy: standard, chronicity of sx      Goals:   Short Term Goals (6 Weeks):   1. Pt will report 25% reduction in head and neck pain for ease with ADL's in progress  2. PT will demonstrate 1/3 MMT improvement in periscapular strength for ease with upright posture in progress  3. Pt will demonstrate improved cervical spine ROM in all directions by 5 degrees for ease with driving to MD appointments in progress     Long Term Goals (12 Weeks):   1. Pt will report being independent with HEP for maintenance of improvements gained during therapy sessions in progress  2. PT will report 50% reduction of head and neck pain for ease with ADLs.  in progress  3. Pt will demonstrate full UE and periscapular strength without the provocation of pain for ease with household chores in progress  4. Pt will demonstrate appropriate upright posture without external cueing for ease with work related activities.  in progress  PLAN     Plan of care Certification: 2/1/2023 to 5/3/2023.     Initiate dry needling at next visit.     Janessa Blair, PT

## 2023-03-10 ENCOUNTER — CLINICAL SUPPORT (OUTPATIENT)
Dept: REHABILITATION | Facility: OTHER | Age: 33
End: 2023-03-10
Payer: COMMERCIAL

## 2023-03-10 DIAGNOSIS — M54.9 CHRONIC NECK AND BACK PAIN: ICD-10-CM

## 2023-03-10 DIAGNOSIS — G89.29 CHRONIC NECK AND BACK PAIN: ICD-10-CM

## 2023-03-10 DIAGNOSIS — G44.219 EPISODIC TENSION-TYPE HEADACHE, NOT INTRACTABLE: Primary | ICD-10-CM

## 2023-03-10 DIAGNOSIS — M54.2 CHRONIC NECK AND BACK PAIN: ICD-10-CM

## 2023-03-10 PROCEDURE — 97110 THERAPEUTIC EXERCISES: CPT | Mod: PN

## 2023-03-10 PROCEDURE — 97140 MANUAL THERAPY 1/> REGIONS: CPT | Mod: PN

## 2023-03-14 ENCOUNTER — CLINICAL SUPPORT (OUTPATIENT)
Dept: REHABILITATION | Facility: OTHER | Age: 33
End: 2023-03-14
Payer: COMMERCIAL

## 2023-03-14 DIAGNOSIS — G44.219 EPISODIC TENSION-TYPE HEADACHE, NOT INTRACTABLE: Primary | ICD-10-CM

## 2023-03-14 DIAGNOSIS — M54.2 CHRONIC NECK AND BACK PAIN: ICD-10-CM

## 2023-03-14 DIAGNOSIS — M54.9 CHRONIC NECK AND BACK PAIN: ICD-10-CM

## 2023-03-14 DIAGNOSIS — G89.29 CHRONIC NECK AND BACK PAIN: ICD-10-CM

## 2023-03-14 PROCEDURE — 97110 THERAPEUTIC EXERCISES: CPT | Mod: PN

## 2023-03-14 PROCEDURE — 97140 MANUAL THERAPY 1/> REGIONS: CPT | Mod: PN

## 2023-03-14 NOTE — PROGRESS NOTES
Dry Needling Daily Note     Patient ID: Daphne Sullivan is a 32 y.o. female.  Diagnosis:   1. Episodic tension-type headache, not intractable        2. Chronic neck and back pain          Date:  03/14/2023    Total Time:  30 min    Subjective:     Pt reports: wanting to try dry needling today as she continues to have mid back pain that radiates up to her shoulders.  Pain Scale: Daphne rates pain on a scale of 0-10 to be 3 currently.    Objective:     Pt signed written consent to dry needling Rx.  Pt gave verbal consent for DN.   Pt rec'd dry needling to low and mid back with 1 in (50-60mm) needles with no adverse effects.    Homeostatic points:  1. Deep Radial  2. Greater Auricular  3. Spinal Accessory  4. Saphenous  5. Deep Fibular  6. Tibial  7. Greater Occipital  8. Suprascapular ( infraspinatus)  9. Lateral Antebrachial Cutaneous  10. Sural  11. Lateral Popliteal  12. Superficial Radial  13. Dorsal Scapular  14. Superior Cluneal  15. Posterior Cutaneous L 2  16. Inferior Gluteal  17. Lateral Pectoral  18. Ilitotibal  19. Infraorbital  20. Spinous process T7  21. Posterior cutaneous  T6  22. Posterior cutaneous L 5  23. Supraorbital  24. Common fibular    Paravertebral Points:  T7-10 (R), L2-S1 (R)    Symptomatic Points:   R QL    Assessment:     Patient demonstrated appropriate response to FDN. Pt presents with increased mm tone on R QL and paraspinals at beginning of session. Winding technique used every 5 minutes. Good tolerance with marked improvement in pain modulation, mm tone on R lumbar paraspinals.    Patient Education/Response:     Education provided re:   Purpose, benefits, and potential side effects of dry needling.   Educated pt to use heat following treatment sessions to reduce c/o pain or soreness and to improve circulation to needled sites.   Encouraged pt to continue with HEP to maintain flexibility, ROM, and functional mobility.  Daphne verbalized good understanding of education      Plans and Goals:     Monitor response to FDN. Continue with FDN in POC as tolerated.     Roula Robert, PT  3/14/2023

## 2023-03-14 NOTE — PROGRESS NOTES
[facility]   Physical Therapy Treatment Note     Name: Daphne Sullivan  Clinic Number: [id number]    Therapy Diagnosis:   Encounter Diagnoses   Name Primary?    Episodic tension-type headache, not intractable Yes    Chronic neck and back pain        Physician: Samia Malave *    Visit Date: 3/14/2023    Physician Orders: PT Eval and Treat   Medical Diagnosis from Referral: M54.2 (ICD-10-CM) - Cervicalgia   Evaluation Date: 2/1/2023  Authorization Period Expiration: 12/31/2023   Plan of Care Expiration: 5/3/2023  Progress Note Due: 04/03/2023  Visit # / Visits authorized: 4/20 (+ eval)  FOTO: 3/10/2023 (2/3)  PTA Visit #: 0/5     Precautions: Standard    Time In: 3:00 pm (late arrival)   Time Out: 3:45 pm   Total Billable Time: 45 minutes      SUBJECTIVE     Pt reports: she is here to try dry needling. Says neck feels better but her mid back pain is more bothersome. Mid back pain and spreads up to the shoulders.    She was compliant with home exercise program.  Response to previous treatment: no adverse effects  Functional change: none     Pain: 5/10   Location: right side of the neck and low back    OBJECTIVE     Objective Measures updated at progress report unless specified.     3/14/2023     Mid traps: right: 3/5; left: 3/5  Rhomboids: right: 3/5; left: 3/5  Lower trap: right: 3/5; left: 3/5  Serratus anterior: right: 4/5; left: 4+/5    3/14/2023  Palpation: increased tone/TTP to R lumbar/lower thoracic paraspinals, R QL with mod pressure      CMS Impairment/Limitation/Restriction for FOTO Neck Survey    Therapist reviewed FOTO scores for Daphne Sullivan on 3/14/2023.   FOTO documents entered into [employer] - see Media section.    Limitation Score: 53%  Category: Body Position    Current : CK = at least 40% but < 60% impaired, limited or restricted  Goal: CJ = at least 20% but < 40% impaired, limited or restricted          TREATMENT     Daphne received the treatments  "listed below:      received therapeutic exercises to develop strength and ROM for 15 minutes including:    Review and education of dry needling purpose, benefits, risks and consent form  Self SNAG  5 x 5"  SNAG for Headache 5 x 5"  Still point inducer x 3'  +Performed while lying supine on MHP pads:  Chin tucks x 20 x 5" holds  DKTC x 3 x 20" holds  LTR x 2 minutes  Left SL QL stretch 3x30" w/ PT overpressure  SL open book 5x10"  Tail wags 3x30"  Cat/camel 10x5'    received the following manual therapy techniques: Soft tissue Mobilization were applied for 30 minutes, including:  IASTYM cupping to lumbar and CTJ paraspinals   30 min x dry needling  - see note by Roula Finn, PT    neuromuscular re-education activities to improve: Balance, Coordination, Kinesthetic, Sense, Proprioception, and Posture for 00 minutes. The following activities were included:  None today.  Initiate DNF and TA activation series next visit.    therapeutic activities to improve functional performance for 00  minutes, including:  None today.  Add next visit - DELMA mccollum    PATIENT EDUCATION AND HOME EXERCISES     Home Exercises Provided and Patient Education Provided     Education provided:   - Dry needling benefits and expectations     Written Home Exercises Provided: Patient instructed to cont prior HEP. Exercises were reviewed and Daphne was able to demonstrate them prior to the end of the session.  Daphne demonstrated good  understanding of the education provided. See EMR under Patient Instructions for exercises provided during therapy sessions    ASSESSMENT     Dry needling initiated today to neck, back, per pt request. Good tolerance with pt presenting with increased anxiety during needle application, but with good relaxation and tolerance once needles placed. Pt presents with good improvement in mm tone, pain modulation by end of session. Educated on use of MHP, stretches to reduce c/o pain, soreness later. HEP reviewed today " with LTR, DKTC, and chin tucks with pt demo'ing good return technique. Encouraged pt to continue with strength and conditioning at home. Reviewed POC, with pt opting to return to clinic in 2-3 weeks for general F/U if sx worsen or persist.    Daphne Is progressing well towards her goals.   Pt prognosis is Good.     Pt will continue to benefit from skilled outpatient physical therapy to address the deficits listed in the problem list box on initial evaluation, provide pt/family education and to maximize pt's level of independence in the home and community environment.     Pt's spiritual, cultural and educational needs considered and pt agreeable to plan of care and goals.     Anticipated barriers to physical therapy: standard, chronicity of sx      Goals:   Short Term Goals (6 Weeks):   1. Pt will report 25% reduction in head and neck pain for ease with ADL's in progress  2. PT will demonstrate 1/3 MMT improvement in periscapular strength for ease with upright posture in progress  3. Pt will demonstrate improved cervical spine ROM in all directions by 5 degrees for ease with driving to MD appointments in progress     Long Term Goals (12 Weeks):   1. Pt will report being independent with HEP for maintenance of improvements gained during therapy sessions in progress  2. PT will report 50% reduction of head and neck pain for ease with ADLs.  in progress  3. Pt will demonstrate full UE and periscapular strength without the provocation of pain for ease with household chores in progress  4. Pt will demonstrate appropriate upright posture without external cueing for ease with work related activities.  in progress  PLAN     Plan of care Certification: 2/1/2023 to 5/3/2023.     Continue with strength, stabilization, and dry needling prn.    Roula Robert, PT

## 2023-03-28 ENCOUNTER — CLINICAL SUPPORT (OUTPATIENT)
Dept: REHABILITATION | Facility: OTHER | Age: 33
End: 2023-03-28
Payer: COMMERCIAL

## 2023-03-28 DIAGNOSIS — G44.219 EPISODIC TENSION-TYPE HEADACHE, NOT INTRACTABLE: Primary | ICD-10-CM

## 2023-03-28 DIAGNOSIS — M54.2 CHRONIC NECK AND BACK PAIN: ICD-10-CM

## 2023-03-28 DIAGNOSIS — M54.9 CHRONIC NECK AND BACK PAIN: ICD-10-CM

## 2023-03-28 DIAGNOSIS — G89.29 CHRONIC NECK AND BACK PAIN: ICD-10-CM

## 2023-03-28 PROCEDURE — 97110 THERAPEUTIC EXERCISES: CPT | Mod: PN

## 2023-03-28 PROCEDURE — 97140 MANUAL THERAPY 1/> REGIONS: CPT | Mod: PN

## 2023-03-31 NOTE — PROGRESS NOTES
Dry Needling Daily Note     Patient ID: Daphne Sullivan is a 32 y.o. female.  Diagnosis:   1. Episodic tension-type headache, not intractable        2. Chronic neck and back pain          Date:  03/30/2023    Total Time:  30 min    Subjective:     Pt reports: wanting to try dry needling today as she continues to have mid back pain that radiates up to her shoulders.  Pain Scale: Daphne rates pain on a scale of 0-10 to be 3 currently.    Objective:     Pt signed written consent to dry needling Rx.  Pt gave verbal consent for DN.   Pt rec'd dry needling to low and mid back with 1 in (50-60mm) needles with no adverse effects.    Homeostatic points:  1. Deep Radial  2. Greater Auricular  3. Spinal Accessory  4. Saphenous  5. Deep Fibular  6. Tibial  7. Greater Occipital  8. Suprascapular ( infraspinatus)  9. Lateral Antebrachial Cutaneous  10. Sural  11. Lateral Popliteal  12. Superficial Radial  13. Dorsal Scapular  14. Superior Cluneal  15. Posterior Cutaneous L 2  16. Inferior Gluteal  17. Lateral Pectoral  18. Ilitotibal  19. Infraorbital  20. Spinous process T7  21. Posterior cutaneous  T6  22. Posterior cutaneous L 5  23. Supraorbital  24. Common fibular    Paravertebral Points:  T7-10 (R), L2-S1 (R)    Symptomatic Points:   R QL    Assessment:     Patient demonstrated appropriate response to FDN. Pt presents with increased mm tone on R QL and paraspinals at beginning of session. Winding technique used every 5 minutes. Good tolerance with marked improvement in pain modulation, mm tone on R lumbar paraspinals.    Patient Education/Response:     Education provided re:   Purpose, benefits, and potential side effects of dry needling.   Educated pt to use heat following treatment sessions to reduce c/o pain or soreness and to improve circulation to needled sites.   Encouraged pt to continue with HEP to maintain flexibility, ROM, and functional mobility.  Daphne verbalized good understanding of education      Plans and Goals:     Monitor response to FDN. Continue with FDN in POC as tolerated.     Roula Robert, PT  3/30/2023

## 2023-03-31 NOTE — PROGRESS NOTES
[facility]   Physical Therapy Treatment Note     Name: Daphne Sullivan  Clinic Number: [id number]    Therapy Diagnosis:   Encounter Diagnoses   Name Primary?    Episodic tension-type headache, not intractable Yes    Chronic neck and back pain        Physician: Samia Malave *    Visit Date: 3/28/2023    Physician Orders: PT Eval and Treat   Medical Diagnosis from Referral: M54.2 (ICD-10-CM) - Cervicalgia   Evaluation Date: 2/1/2023  Authorization Period Expiration: 12/31/2023   Plan of Care Expiration: 5/3/2023  Progress Note Due: 04/03/2023  Visit # / Visits authorized: 4/20 (+ eval)  FOTO: 3/10/2023 (2/3)  PTA Visit #: 0/5     Precautions: Standard    Time In: 3:00 pm (late arrival)   Time Out: 3:45 pm   Total Billable Time: 45 minutes      SUBJECTIVE     Pt reports: she is here to try dry needling. Says neck feels better but her mid back pain is more bothersome. Mid back pain and spreads up to the shoulders.    She was compliant with home exercise program.  Response to previous treatment: no adverse effects  Functional change: none     Pain: 5/10   Location: right side of the neck and low back    OBJECTIVE     Objective Measures updated at progress report unless specified.     3/30/2023     Mid traps: right: 3/5; left: 3/5  Rhomboids: right: 3/5; left: 3/5  Lower trap: right: 3/5; left: 3/5  Serratus anterior: right: 4/5; left: 4+/5    3/14/2023  Palpation: increased tone/TTP to R lumbar/lower thoracic paraspinals, R QL with mod pressure      CMS Impairment/Limitation/Restriction for FOTO Neck Survey    Therapist reviewed FOTO scores for Daphne Sullivan on 3/28/2023.   FOTO documents entered into [employer] - see Media section.    Limitation Score: 53%  Category: Body Position    Current : CK = at least 40% but < 60% impaired, limited or restricted  Goal: CJ = at least 20% but < 40% impaired, limited or restricted          TREATMENT     Daphne received the treatments  "listed below:      received therapeutic exercises to develop strength and ROM for 15 minutes including:    Review and education of dry needling purpose, benefits, risks and consent form  Self SNAG  5 x 5"  SNAG for Headache 5 x 5"  Still point inducer x 3'  Performed while lying supine on [facility] pads:  Chin tucks x 20 x 5" holds  DKTC x 3 x 20" holds  LTR x 2 minutes  Left SL QL stretch 3x30" w/ PT overpressure  SL open book 5x10"  Tail wags 3x30"  Cat/camel 10x5'    received the following manual therapy techniques: Soft tissue Mobilization were applied for 30 minutes, including:  IASTYM cupping to lumbar and CTJ paraspinals   30 min x dry needling  - see note by Roula Finn, PT    neuromuscular re-education activities to improve: Balance, Coordination, Kinesthetic, Sense, Proprioception, and Posture for 00 minutes. The following activities were included:  None today.  Initiate DNF and TA activation series next visit.    therapeutic activities to improve functional performance for 00  minutes, including:  None today.  Add next visit - DELMA mccollum    PATIENT EDUCATION AND HOME EXERCISES     Home Exercises Provided and Patient Education Provided     Education provided:   - Dry needling benefits and expectations     Written Home Exercises Provided: Patient instructed to cont prior HEP. Exercises were reviewed and Daphne was able to demonstrate them prior to the end of the session.  Daphne demonstrated good  understanding of the education provided. See EMR under Patient Instructions for exercises provided during therapy sessions    ASSESSMENT     Resumed dry needling today, with pt presenting with increased R lumbar paraspinal and QL tone, in addition to jacque UT tone, with global TTP. Fair tolerance with dry needling, with pt self limiting DN to neck > back. Limited progression of therex program secondary to time constraints.  Encouraged pt to continue with strength and conditioning at home. Reviewed POC, with pt " opting to return to clinic in 2-3 weeks for general F/U if sx worsen or persist.    Daphne Is progressing well towards her goals.   Pt prognosis is Good.     Pt will continue to benefit from skilled outpatient physical therapy to address the deficits listed in the problem list box on initial evaluation, provide pt/family education and to maximize pt's level of independence in the home and community environment.     Pt's spiritual, cultural and educational needs considered and pt agreeable to plan of care and goals.     Anticipated barriers to physical therapy: standard, chronicity of sx      Goals:   Short Term Goals (6 Weeks):   1. Pt will report 25% reduction in head and neck pain for ease with ADL's in progress  2. PT will demonstrate 1/3 MMT improvement in periscapular strength for ease with upright posture in progress  3. Pt will demonstrate improved cervical spine ROM in all directions by 5 degrees for ease with driving to MD appointments in progress     Long Term Goals (12 Weeks):   1. Pt will report being independent with HEP for maintenance of improvements gained during therapy sessions in progress  2. PT will report 50% reduction of head and neck pain for ease with ADLs.  in progress  3. Pt will demonstrate full UE and periscapular strength without the provocation of pain for ease with household chores in progress  4. Pt will demonstrate appropriate upright posture without external cueing for ease with work related activities.  in progress  PLAN     Plan of care Certification: 2/1/2023 to 5/3/2023.     Continue with strength, stabilization, and dry needling prn.    Roula Robert, PT

## 2023-04-10 ENCOUNTER — PATIENT MESSAGE (OUTPATIENT)
Dept: INTERNAL MEDICINE | Facility: CLINIC | Age: 33
End: 2023-04-10
Payer: COMMERCIAL

## 2023-04-18 ENCOUNTER — CLINICAL SUPPORT (OUTPATIENT)
Dept: REHABILITATION | Facility: OTHER | Age: 33
End: 2023-04-18
Payer: COMMERCIAL

## 2023-04-18 DIAGNOSIS — M54.2 CHRONIC NECK AND BACK PAIN: ICD-10-CM

## 2023-04-18 DIAGNOSIS — G89.29 CHRONIC NECK AND BACK PAIN: ICD-10-CM

## 2023-04-18 DIAGNOSIS — G44.219 EPISODIC TENSION-TYPE HEADACHE, NOT INTRACTABLE: Primary | ICD-10-CM

## 2023-04-18 DIAGNOSIS — M54.9 CHRONIC NECK AND BACK PAIN: ICD-10-CM

## 2023-04-18 PROCEDURE — 97140 MANUAL THERAPY 1/> REGIONS: CPT | Mod: PN

## 2023-04-18 PROCEDURE — 97110 THERAPEUTIC EXERCISES: CPT | Mod: PN

## 2023-04-18 NOTE — PROGRESS NOTES
[facility]   Physical Therapy Treatment Note     Name: Daphne Sullivan  Clinic Number: [id number]    Therapy Diagnosis:   Encounter Diagnoses   Name Primary?    Episodic tension-type headache, not intractable Yes    Chronic neck and back pain        Physician: Samia Malave *    Visit Date: 4/18/2023    Physician Orders: PT Eval and Treat   Medical Diagnosis from Referral: M54.2 (ICD-10-CM) - Cervicalgia   Evaluation Date: 2/1/2023  Authorization Period Expiration: 12/31/2023   Plan of Care Expiration: 5/3/2023  Progress Note Due: 04/03/2023  Visit # / Visits authorized: 5/20 (+ eval)  FOTO: 3/10/2023 (2/3)  PTA Visit #: 0/5     Precautions: Standard    Time In: 5:15  Time Out: 6:00 pm   Total Billable Time: 45 minutes      SUBJECTIVE     Pt reports: the needling has helped. Says she has not had a bad headache in a while. Back feels tight. C/c today is L hamstring tightness and occasional feelings of weakness. Has taken a break from working with .     She was compliant with home exercise program.  Response to previous treatment: no adverse effects  Functional change: none     Pain: 2/10   Location: right side of the neck and low back    OBJECTIVE     Objective Measures updated at progress report unless specified.     Updated 4/18/2023    Selective Functional Movement Assessment:    Central Left Right   Active Cervical Flexion DP - max brewster, chin 3 fingers to chest, tension on R, flat mid CSP curve --- ---   Active Cervical Extension FN  --- ---   Cervical Rotation Bend --- FP -  tension in R neck DP - mod brewster <50 deg   Upper Extremity Pattern 1 (IR) --- DN DN   Upper Extremity Pattern 2 (ER) --- DN DN   Multisegmental Flexion FN - fingertips to toes, jacque HS tension --- ---   Multisegmental Exetnsion FN  --- ---   Multisegmental Rotation --- FN DN   Single Leg Stance --- FN DN    Overhead Deep Squat DN  --- ---      Abbreviations:  FN - functional non painful  FP -  "functional painful  DP - dysfunctional painful  DN - dysfunctional non painful           Upper quarter eval           UPPER EXTREMITY STRENGTH:    Left Right   Mid trap 4/5 4/5   Lower trap 4-/5 4-/5   Serratus anterior 4/5 4/5      Flexibility:   Pectoralis Major: min brewster (R)  Pectoralis Minor: min brewster (R)  Upper Traps: min brewster jacque  Levator Scap: min brewster jacque  Scalenes: mod brewster     Joint Mobility: hypo C0-2, C6-T5       Lower quarter eval      Lower Extremity Strength  Right LE   Left LE     Hip flexion: 5/5 Hip flexion: 5/5   Hip extension:  5/5 Hip extension: 5/5   Hip abduction: 5/5 Hip abduction: 5/5   Hip adduction:  5/5 Hip adduction:  5/5   Hip Internal rotation    5/5 Hip Internal rotation 5/5   Knee Flexion 5/5 Knee Flexion 4+/5   Knee Extension 5-/5 Knee Extension 5/5      Flexibility:   90-90 HS: R=L, no pain  Hamstring (SLR): >80 jacque (WNL, no pain)     Joint Mobility: min brewster L2-S1 LR with tight mm EF     Special Tests:   ASLR: - R  Slump test: - R    TREATMENT     Daphne received the treatments listed below:      received therapeutic exercises to develop strength and ROM for 15 minutes including:  Reassessment performed, in addition to updated HEP, PT plan, expectations for further care x 15'    Review and education of dry needling purpose, benefits, risks and consent form  Self SNAG  5 x 5"  SNAG for Headache 5 x 5"  Still point inducer x 3'  Performed while lying supine on [facility] pads:  Chin tucks x 20 x 5" holds  DKTC x 3 x 20" holds  LTR x 2 minutes  Left SL QL stretch 3x30" w/ PT overpressure  SL open book 5x10"  Tail wags 3x30"  Cat/camel 10x5'    received the following manual therapy techniques: Soft tissue Mobilization were applied for 30 minutes, including:  IASTYM cupping to lumbar and CTJ paraspinals   30 min x dry needling  - see note by Roula Finn, PT    neuromuscular re-education activities to improve: Balance, Coordination, Kinesthetic, Sense, Proprioception, and Posture for 00 " minutes. The following activities were included:  None today.  Initiate DNF and TA activation series next visit.    therapeutic activities to improve functional performance for 00  minutes, including:  None today.  Add next visit - rows, SALPD    PATIENT EDUCATION AND HOME EXERCISES     Home Exercises Provided and Patient Education Provided     Education provided:   - Dry needling benefits and expectations     Written Home Exercises Provided: Patient instructed to cont prior HEP. Exercises were reviewed and Daphne was able to demonstrate them prior to the end of the session.  Daphne demonstrated good  understanding of the education provided. See EMR under Patient Instructions for exercises provided during therapy sessions    ASSESSMENT     Resumed dry needling today, with pt presenting with increased R lumbar paraspinal and QL tone, in addition to jacque UT tone, with global TTP. Fair tolerance with dry needling, with pt self limiting DN to neck > back. Limited progression of therex program secondary to time constraints.   Reassessment performed today with pt presenting with marked improvements in trunk AROM, BLE flexibility, and strength, but with slight worsening in L hamstring strength due to recent c/o pain, weakness with soccer/running. Encouraged pt to resumed personal training sessions 2-3x/week with emphasis on core, glute and hamstring strengthening for stabilization and motor control during dynamic activities. Also encouraged continuation of scapular strengthening on her own at the gym for continued postural awareness/endurance.    Plan to F/U with PT in 2 weeks for further assessment, updated HEP, and plan for necessity of continuation of care. If return to sport and training improved consider discharge at that point. Pt pt continues to have pain in neck or back consider referral to PCP, orthopedist or spine specialist, and referral to [facility].    Daphne Is progressing well towards her goals.    Pt prognosis is Good.     Pt will continue to benefit from skilled outpatient physical therapy to address the deficits listed in the problem list box on initial evaluation, provide pt/family education and to maximize pt's level of independence in the home and community environment.     Pt's spiritual, cultural and educational needs considered and pt agreeable to plan of care and goals.     Anticipated barriers to physical therapy: standard, chronicity of sx      Goals:   Short Term Goals (6 Weeks):   1. Pt will report 25% reduction in head and neck pain for ease with ADL's (met)  2. PT will demonstrate 1/3 MMT improvement in periscapular strength for ease with upright posture in progress  3. Pt will demonstrate improved cervical spine ROM in all directions by 5 degrees for ease with driving to MD appointments in progress     Long Term Goals (12 Weeks):   1. Pt will report being independent with HEP for maintenance of improvements gained during therapy sessions in progress  2. PT will report 50% reduction of head and neck pain for ease with ADLs.  in progress  3. Pt will demonstrate full UE and periscapular strength without the provocation of pain for ease with household chores in progress  4. Pt will demonstrate appropriate upright posture without external cueing for ease with work related activities.  in progress  PLAN     Plan of care Certification: 2/1/2023 to 5/3/2023.     Continue with strength, stabilization, and dry needling prn.    Roula Robert, PT

## 2023-04-18 NOTE — PROGRESS NOTES
Dry Needling Daily Note     Patient ID: Daphne Sullivan is a 32 y.o. female.  Diagnosis:   1. Episodic tension-type headache, not intractable        2. Chronic neck and back pain          Date:  04/18/2023    Total Time:  30 min    Subjective:     Pt reports: wanting to try dry needling today as she continues to have mid back pain that radiates up to her shoulders.  Pain Scale: Daphne rates pain on a scale of 0-10 to be 3 currently.    Objective:     Pt signed written consent to dry needling Rx.  Pt gave verbal consent for DN.   Pt rec'd dry needling to low and mid back with 1 in (50-60mm) needles with no adverse effects.    Homeostatic points:  1. Deep Radial  2. Greater Auricular  3. Spinal Accessory  4. Saphenous  5. Deep Fibular  6. Tibial  7. Greater Occipital  8. Suprascapular ( infraspinatus)  9. Lateral Antebrachial Cutaneous  10. Sural  11. Lateral Popliteal  12. Superficial Radial  13. Dorsal Scapular  14. Superior Cluneal  15. Posterior Cutaneous L 2  16. Inferior Gluteal  17. Lateral Pectoral  18. Ilitotibal  19. Infraorbital  20. Spinous process T7  21. Posterior cutaneous  T6  22. Posterior cutaneous L 5  23. Supraorbital  24. Common fibular    Paravertebral Points:  T7-10 (R), L2-S1 (LINDA)    Symptomatic Points:   LINDA QL    Assessment:     Patient demonstrated appropriate response to FDN. Pt presents with increased mm tone on R>L QL and R paraspinals at beginning of session. Twitch response noted to L lumbar paraspinals. Winding technique used every 5 minutes. Good tolerance with marked improvement in pain modulation, mm tone on R lumbar paraspinals.    Patient Education/Response:     Education provided re:   Purpose, benefits, and potential side effects of dry needling.   Educated pt to use heat following treatment sessions to reduce c/o pain or soreness and to improve circulation to needled sites.   Encouraged pt to continue with HEP to maintain flexibility, ROM, and functional  mobility.  Daphne verbalized good understanding of education     Plans and Goals:     Monitor response to FDN. Continue with FDN in POC as tolerated.     Roula Robert, PT  4/18/2023

## 2023-04-27 ENCOUNTER — PATIENT MESSAGE (OUTPATIENT)
Dept: INTERNAL MEDICINE | Facility: CLINIC | Age: 33
End: 2023-04-27
Payer: COMMERCIAL

## 2023-05-03 ENCOUNTER — OFFICE VISIT (OUTPATIENT)
Dept: INTERNAL MEDICINE | Facility: CLINIC | Age: 33
End: 2023-05-03
Attending: INTERNAL MEDICINE
Payer: COMMERCIAL

## 2023-05-03 DIAGNOSIS — F40.10 SOCIAL ANXIETY DISORDER: Primary | ICD-10-CM

## 2023-05-03 PROCEDURE — 1159F PR MEDICATION LIST DOCUMENTED IN MEDICAL RECORD: ICD-10-PCS | Mod: CPTII,95,, | Performed by: INTERNAL MEDICINE

## 2023-05-03 PROCEDURE — 1159F MED LIST DOCD IN RCRD: CPT | Mod: CPTII,95,, | Performed by: INTERNAL MEDICINE

## 2023-05-03 PROCEDURE — 99213 PR OFFICE/OUTPT VISIT, EST, LEVL III, 20-29 MIN: ICD-10-PCS | Mod: 95,,, | Performed by: INTERNAL MEDICINE

## 2023-05-03 PROCEDURE — 99213 OFFICE O/P EST LOW 20 MIN: CPT | Mod: 95,,, | Performed by: INTERNAL MEDICINE

## 2023-05-03 NOTE — PROGRESS NOTES
The patient location is:  Patient Home   The chief complaint leading to consultation is:  Medication Refill    Subjective:         Taking low dose zoloft for anxiety- has been doing well. Tired of taking daily medicine.  Issues now related mostly to fear of flying.  Feels some lack of sex drive, some concerns regarding anorgasmia.  Started about 8 months ago. Completed major life transitions, feels like she is in a stable place.  Has a few ativan which she had used in the past so will use for flying. No hx of abuse or concerning behaviors.    reviewed- last ativan rx 8/22 with 20 tabs    Medication Refill  Pertinent negatives include no arthralgias, chest pain, headaches, joint swelling, neck pain, vomiting or weakness.    Daphne Sullivan is a 32 y.o.  female who presents for Medication Refill  .   Review of Systems   Constitutional:  Negative for activity change and unexpected weight change.   HENT:  Negative for hearing loss, rhinorrhea and trouble swallowing.    Eyes:  Negative for discharge and visual disturbance.   Respiratory:  Negative for chest tightness and wheezing.    Cardiovascular:  Negative for chest pain and palpitations.   Gastrointestinal:  Negative for blood in stool, constipation, diarrhea and vomiting.   Endocrine: Negative for polydipsia and polyuria.   Genitourinary:  Negative for difficulty urinating, dysuria, hematuria and menstrual problem.   Musculoskeletal:  Negative for arthralgias, joint swelling and neck pain.   Neurological:  Negative for weakness and headaches.   Psychiatric/Behavioral:  Negative for confusion and dysphoric mood.      Patient Active Problem List   Diagnosis    Anal fissure    Fissure in ano    Social anxiety disorder    Chronic right-sided low back pain without sciatica    Chronic tension-type headache, not intractable    Episodic tension-type headache, not intractable    Chronic neck and back pain       Past Medical History:   Diagnosis Date    Anxiety  disorder        Past Surgical History:   Procedure Laterality Date    NO PAST SURGERIES         Family History   Problem Relation Age of Onset    Heart disease Father 67        CAD--MI    Breast cancer Neg Hx     Cancer Neg Hx     Colon cancer Neg Hx     Diabetes Neg Hx     Eclampsia Neg Hx     Ovarian cancer Neg Hx      labor Neg Hx     Hypertension Neg Hx     Miscarriages / Stillbirths Neg Hx     Stroke Neg Hx        Social History     Tobacco Use    Smoking status: Never    Smokeless tobacco: Never   Substance Use Topics    Alcohol use: Yes     Alcohol/week: 2.0 standard drinks     Types: 2 Standard drinks or equivalent per week     Comment: socially    Drug use: No       Objective:   There were no vitals taken for this visit.     Physical Exam  Constitutional:       General: She is not in acute distress.     Appearance: She is well-developed. She is not diaphoretic.   HENT:      Head: Normocephalic and atraumatic.   Eyes:      General: No scleral icterus.        Right eye: No discharge.         Left eye: No discharge.   Pulmonary:      Effort: Pulmonary effort is normal. No respiratory distress.   Skin:     Coloration: Skin is not pale.      Findings: No erythema.   Neurological:      Mental Status: She is alert and oriented to person, place, and time.   Psychiatric:         Behavior: Behavior normal.         Thought Content: Thought content normal.         Prior labs reviewed  Assessment/Plan:       1. Social anxiety disorder  Comments:  discussed brief taper starting next mo  call if any issues, call for prn ativan refill  questions answered  Overview:  Started zoloft 25 mg  doing well, not using xanax, able to fly, attend social events without issues  5/3/23 ready to stop daily med, discussed brief taper off zoloft, call if any issues, cont sparing use of ativan              Visit type: Virtual visit with synchronous audio and video    Total time spent with patient: 11 minutes    Each patient  to whom he or she provides medical services by telemedicine is:  (1) informed of the relationship between the physician and patient and the respective role of any other health care provider with respect to management of the patient; and (2) notified that he or she may decline to receive medical services by telemedicine and may withdraw from such care at any time.  Medication List with Changes/Refills   Current Medications    (MAGIC MOUTHWASH) 1:1:1 DIPHENHYDRAMINE(BENADRYL) 12.5MG/5ML LIQ, ALUMINUM & MAGNESIUM HYDROXIDE-SIMETHICONE (MAALOX), LIDOCAINE VISCOUS 2%    Swish and spit 10 mLs every 4 (four) hours as needed (sore throat).    GABAPENTIN (NEURONTIN) 300 MG CAPSULE    Take 1 capsule (300 mg total) by mouth 3 (three) times daily. Take 1 at night, if tolerating and not excessively drowsy can take 2-3 times a day    NORETHINDRONE-ETHINYL ESTRADIOL (BLISOVI FE 1/20, 28,) 1 MG-20 MCG (21)/75 MG (7) PER TABLET    Take 1 tablet by mouth once daily.    SERTRALINE (ZOLOFT) 25 MG TABLET    Take 1 tablet (25 mg total) by mouth once daily.

## 2023-06-12 ENCOUNTER — PATIENT MESSAGE (OUTPATIENT)
Dept: NEUROLOGY | Facility: CLINIC | Age: 33
End: 2023-06-12
Payer: COMMERCIAL

## 2023-06-12 ENCOUNTER — PATIENT MESSAGE (OUTPATIENT)
Dept: INTERNAL MEDICINE | Facility: CLINIC | Age: 33
End: 2023-06-12
Payer: COMMERCIAL

## 2023-06-12 DIAGNOSIS — G44.229 CHRONIC TENSION-TYPE HEADACHE, NOT INTRACTABLE: Primary | ICD-10-CM

## 2023-06-12 DIAGNOSIS — M54.2 CERVICALGIA: ICD-10-CM

## 2023-06-24 ENCOUNTER — PATIENT MESSAGE (OUTPATIENT)
Dept: INTERNAL MEDICINE | Facility: CLINIC | Age: 33
End: 2023-06-24
Payer: COMMERCIAL

## 2023-10-05 ENCOUNTER — PATIENT MESSAGE (OUTPATIENT)
Dept: INTERNAL MEDICINE | Facility: CLINIC | Age: 33
End: 2023-10-05
Payer: COMMERCIAL

## 2023-10-10 ENCOUNTER — LAB VISIT (OUTPATIENT)
Dept: LAB | Facility: OTHER | Age: 33
End: 2023-10-10
Attending: INTERNAL MEDICINE
Payer: COMMERCIAL

## 2023-10-10 ENCOUNTER — PATIENT MESSAGE (OUTPATIENT)
Dept: INTERNAL MEDICINE | Facility: CLINIC | Age: 33
End: 2023-10-10
Payer: COMMERCIAL

## 2023-10-10 DIAGNOSIS — Z00.00 ANNUAL PHYSICAL EXAM: ICD-10-CM

## 2023-10-10 DIAGNOSIS — R17 ELEVATED BILIRUBIN: Primary | ICD-10-CM

## 2023-10-10 LAB
25(OH)D3+25(OH)D2 SERPL-MCNC: 54 NG/ML (ref 30–96)
ALBUMIN SERPL BCP-MCNC: 4.1 G/DL (ref 3.5–5.2)
ALP SERPL-CCNC: 50 U/L (ref 55–135)
ALT SERPL W/O P-5'-P-CCNC: 13 U/L (ref 10–44)
ANION GAP SERPL CALC-SCNC: 9 MMOL/L (ref 8–16)
AST SERPL-CCNC: 18 U/L (ref 10–40)
BASOPHILS # BLD AUTO: 0.02 K/UL (ref 0–0.2)
BASOPHILS NFR BLD: 0.4 % (ref 0–1.9)
BILIRUB SERPL-MCNC: 1.8 MG/DL (ref 0.1–1)
BUN SERPL-MCNC: 11 MG/DL (ref 6–20)
CALCIUM SERPL-MCNC: 9.1 MG/DL (ref 8.7–10.5)
CHLORIDE SERPL-SCNC: 108 MMOL/L (ref 95–110)
CHOLEST SERPL-MCNC: 153 MG/DL (ref 120–199)
CHOLEST/HDLC SERPL: 2.7 {RATIO} (ref 2–5)
CO2 SERPL-SCNC: 23 MMOL/L (ref 23–29)
CREAT SERPL-MCNC: 1 MG/DL (ref 0.5–1.4)
DIFFERENTIAL METHOD: ABNORMAL
EOSINOPHIL # BLD AUTO: 0.1 K/UL (ref 0–0.5)
EOSINOPHIL NFR BLD: 2.1 % (ref 0–8)
ERYTHROCYTE [DISTWIDTH] IN BLOOD BY AUTOMATED COUNT: 12 % (ref 11.5–14.5)
EST. GFR  (NO RACE VARIABLE): >60 ML/MIN/1.73 M^2
ESTIMATED AVG GLUCOSE: 100 MG/DL (ref 68–131)
GLUCOSE SERPL-MCNC: 90 MG/DL (ref 70–110)
HBA1C MFR BLD: 5.1 % (ref 4–5.6)
HCT VFR BLD AUTO: 41.8 % (ref 37–48.5)
HCV AB SERPL QL IA: NORMAL
HDLC SERPL-MCNC: 56 MG/DL (ref 40–75)
HDLC SERPL: 36.6 % (ref 20–50)
HGB BLD-MCNC: 13.8 G/DL (ref 12–16)
HIV 1+2 AB+HIV1 P24 AG SERPL QL IA: NORMAL
IMM GRANULOCYTES # BLD AUTO: 0.01 K/UL (ref 0–0.04)
IMM GRANULOCYTES NFR BLD AUTO: 0.2 % (ref 0–0.5)
LDLC SERPL CALC-MCNC: 79 MG/DL (ref 63–159)
LYMPHOCYTES # BLD AUTO: 2.2 K/UL (ref 1–4.8)
LYMPHOCYTES NFR BLD: 48.1 % (ref 18–48)
MCH RBC QN AUTO: 31.4 PG (ref 27–31)
MCHC RBC AUTO-ENTMCNC: 33 G/DL (ref 32–36)
MCV RBC AUTO: 95 FL (ref 82–98)
MONOCYTES # BLD AUTO: 0.5 K/UL (ref 0.3–1)
MONOCYTES NFR BLD: 9.9 % (ref 4–15)
NEUTROPHILS # BLD AUTO: 1.8 K/UL (ref 1.8–7.7)
NEUTROPHILS NFR BLD: 39.3 % (ref 38–73)
NONHDLC SERPL-MCNC: 97 MG/DL
NRBC BLD-RTO: 0 /100 WBC
PLATELET # BLD AUTO: 276 K/UL (ref 150–450)
PMV BLD AUTO: 10.1 FL (ref 9.2–12.9)
POTASSIUM SERPL-SCNC: 4.1 MMOL/L (ref 3.5–5.1)
PROT SERPL-MCNC: 7.1 G/DL (ref 6–8.4)
RBC # BLD AUTO: 4.4 M/UL (ref 4–5.4)
SODIUM SERPL-SCNC: 140 MMOL/L (ref 136–145)
TRIGL SERPL-MCNC: 90 MG/DL (ref 30–150)
TSH SERPL DL<=0.005 MIU/L-ACNC: 2.39 UIU/ML (ref 0.4–4)
WBC # BLD AUTO: 4.66 K/UL (ref 3.9–12.7)

## 2023-10-10 PROCEDURE — 85025 COMPLETE CBC W/AUTO DIFF WBC: CPT | Performed by: INTERNAL MEDICINE

## 2023-10-10 PROCEDURE — 80061 LIPID PANEL: CPT | Performed by: INTERNAL MEDICINE

## 2023-10-10 PROCEDURE — 80053 COMPREHEN METABOLIC PANEL: CPT | Performed by: INTERNAL MEDICINE

## 2023-10-10 PROCEDURE — 36415 COLL VENOUS BLD VENIPUNCTURE: CPT | Performed by: INTERNAL MEDICINE

## 2023-10-10 PROCEDURE — 87389 HIV-1 AG W/HIV-1&-2 AB AG IA: CPT | Performed by: INTERNAL MEDICINE

## 2023-10-10 PROCEDURE — 82306 VITAMIN D 25 HYDROXY: CPT | Performed by: INTERNAL MEDICINE

## 2023-10-10 PROCEDURE — 83036 HEMOGLOBIN GLYCOSYLATED A1C: CPT | Performed by: INTERNAL MEDICINE

## 2023-10-10 PROCEDURE — 86803 HEPATITIS C AB TEST: CPT | Performed by: INTERNAL MEDICINE

## 2023-10-10 PROCEDURE — 84443 ASSAY THYROID STIM HORMONE: CPT | Performed by: INTERNAL MEDICINE

## 2023-10-10 NOTE — TELEPHONE ENCOUNTER
Please let pt know her bilirubin, a liver enzyme was elevated. We should repeat this. Please schedule a follow up apt to evaluate further.

## 2023-10-11 NOTE — TELEPHONE ENCOUNTER
"Spoke w pt and scheduled labs and fu appt w GREGORY Rahman per Dr. Meneses instruction:    "Please let pt know her bilirubin, a liver enzyme was elevated. We should repeat this. Please schedule a follow up apt to evaluate further."  "

## 2023-10-18 ENCOUNTER — LAB VISIT (OUTPATIENT)
Dept: LAB | Facility: OTHER | Age: 33
End: 2023-10-18
Attending: INTERNAL MEDICINE
Payer: COMMERCIAL

## 2023-10-18 DIAGNOSIS — R17 ELEVATED BILIRUBIN: ICD-10-CM

## 2023-10-18 LAB
ALBUMIN SERPL BCP-MCNC: 4 G/DL (ref 3.5–5.2)
ALP SERPL-CCNC: 48 U/L (ref 55–135)
ALT SERPL W/O P-5'-P-CCNC: 10 U/L (ref 10–44)
AST SERPL-CCNC: 16 U/L (ref 10–40)
BILIRUB DIRECT SERPL-MCNC: 0.5 MG/DL (ref 0.1–0.3)
BILIRUB DIRECT SERPL-MCNC: 0.5 MG/DL (ref 0.1–0.3)
BILIRUB SERPL-MCNC: 1.1 MG/DL (ref 0.1–1)
PROT SERPL-MCNC: 7 G/DL (ref 6–8.4)

## 2023-10-18 PROCEDURE — 80076 HEPATIC FUNCTION PANEL: CPT | Performed by: INTERNAL MEDICINE

## 2023-10-18 PROCEDURE — 36415 COLL VENOUS BLD VENIPUNCTURE: CPT | Performed by: INTERNAL MEDICINE

## 2023-10-19 ENCOUNTER — OFFICE VISIT (OUTPATIENT)
Dept: OBSTETRICS AND GYNECOLOGY | Facility: CLINIC | Age: 33
End: 2023-10-19
Attending: OBSTETRICS & GYNECOLOGY
Payer: COMMERCIAL

## 2023-10-19 VITALS
BODY MASS INDEX: 23.15 KG/M2 | SYSTOLIC BLOOD PRESSURE: 118 MMHG | DIASTOLIC BLOOD PRESSURE: 78 MMHG | WEIGHT: 126.56 LBS

## 2023-10-19 DIAGNOSIS — Z01.419 WELL FEMALE EXAM WITH ROUTINE GYNECOLOGICAL EXAM: Primary | ICD-10-CM

## 2023-10-19 DIAGNOSIS — Z30.8 ENCOUNTER FOR OTHER CONTRACEPTIVE MANAGEMENT: ICD-10-CM

## 2023-10-19 PROCEDURE — 3078F DIAST BP <80 MM HG: CPT | Mod: CPTII,S$GLB,, | Performed by: OBSTETRICS & GYNECOLOGY

## 2023-10-19 PROCEDURE — 88175 CYTOPATH C/V AUTO FLUID REDO: CPT | Performed by: OBSTETRICS & GYNECOLOGY

## 2023-10-19 PROCEDURE — 99999 PR PBB SHADOW E&M-EST. PATIENT-LVL III: CPT | Mod: PBBFAC,,, | Performed by: OBSTETRICS & GYNECOLOGY

## 2023-10-19 PROCEDURE — 1160F PR REVIEW ALL MEDS BY PRESCRIBER/CLIN PHARMACIST DOCUMENTED: ICD-10-PCS | Mod: CPTII,S$GLB,, | Performed by: OBSTETRICS & GYNECOLOGY

## 2023-10-19 PROCEDURE — 1159F PR MEDICATION LIST DOCUMENTED IN MEDICAL RECORD: ICD-10-PCS | Mod: CPTII,S$GLB,, | Performed by: OBSTETRICS & GYNECOLOGY

## 2023-10-19 PROCEDURE — 3078F PR MOST RECENT DIASTOLIC BLOOD PRESSURE < 80 MM HG: ICD-10-PCS | Mod: CPTII,S$GLB,, | Performed by: OBSTETRICS & GYNECOLOGY

## 2023-10-19 PROCEDURE — 1159F MED LIST DOCD IN RCRD: CPT | Mod: CPTII,S$GLB,, | Performed by: OBSTETRICS & GYNECOLOGY

## 2023-10-19 PROCEDURE — 1160F RVW MEDS BY RX/DR IN RCRD: CPT | Mod: CPTII,S$GLB,, | Performed by: OBSTETRICS & GYNECOLOGY

## 2023-10-19 PROCEDURE — 3008F PR BODY MASS INDEX (BMI) DOCUMENTED: ICD-10-PCS | Mod: CPTII,S$GLB,, | Performed by: OBSTETRICS & GYNECOLOGY

## 2023-10-19 PROCEDURE — 3074F SYST BP LT 130 MM HG: CPT | Mod: CPTII,S$GLB,, | Performed by: OBSTETRICS & GYNECOLOGY

## 2023-10-19 PROCEDURE — 99395 PR PREVENTIVE VISIT,EST,18-39: ICD-10-PCS | Mod: S$GLB,,, | Performed by: OBSTETRICS & GYNECOLOGY

## 2023-10-19 PROCEDURE — 3074F PR MOST RECENT SYSTOLIC BLOOD PRESSURE < 130 MM HG: ICD-10-PCS | Mod: CPTII,S$GLB,, | Performed by: OBSTETRICS & GYNECOLOGY

## 2023-10-19 PROCEDURE — 99395 PREV VISIT EST AGE 18-39: CPT | Mod: S$GLB,,, | Performed by: OBSTETRICS & GYNECOLOGY

## 2023-10-19 PROCEDURE — 3044F PR MOST RECENT HEMOGLOBIN A1C LEVEL <7.0%: ICD-10-PCS | Mod: CPTII,S$GLB,, | Performed by: OBSTETRICS & GYNECOLOGY

## 2023-10-19 PROCEDURE — 99999 PR PBB SHADOW E&M-EST. PATIENT-LVL III: ICD-10-PCS | Mod: PBBFAC,,, | Performed by: OBSTETRICS & GYNECOLOGY

## 2023-10-19 PROCEDURE — 3044F HG A1C LEVEL LT 7.0%: CPT | Mod: CPTII,S$GLB,, | Performed by: OBSTETRICS & GYNECOLOGY

## 2023-10-19 PROCEDURE — 3008F BODY MASS INDEX DOCD: CPT | Mod: CPTII,S$GLB,, | Performed by: OBSTETRICS & GYNECOLOGY

## 2023-10-19 RX ORDER — NORETHINDRONE ACETATE AND ETHINYL ESTRADIOL 1MG-20(21)
1 KIT ORAL DAILY
Qty: 84 TABLET | Refills: 3 | Status: SHIPPED | OUTPATIENT
Start: 2023-10-19 | End: 2023-12-02

## 2023-10-20 ENCOUNTER — OFFICE VISIT (OUTPATIENT)
Dept: INTERNAL MEDICINE | Facility: CLINIC | Age: 33
End: 2023-10-20
Payer: COMMERCIAL

## 2023-10-20 ENCOUNTER — HOSPITAL ENCOUNTER (OUTPATIENT)
Dept: RADIOLOGY | Facility: OTHER | Age: 33
Discharge: HOME | End: 2023-10-20
Attending: PHYSICIAN ASSISTANT
Payer: COMMERCIAL

## 2023-10-20 VITALS
HEART RATE: 58 BPM | DIASTOLIC BLOOD PRESSURE: 76 MMHG | SYSTOLIC BLOOD PRESSURE: 122 MMHG | HEIGHT: 62 IN | WEIGHT: 126.75 LBS | BODY MASS INDEX: 23.32 KG/M2 | OXYGEN SATURATION: 92 %

## 2023-10-20 DIAGNOSIS — R17 SERUM TOTAL BILIRUBIN ELEVATED: ICD-10-CM

## 2023-10-20 DIAGNOSIS — G44.229 CHRONIC TENSION-TYPE HEADACHE, NOT INTRACTABLE: ICD-10-CM

## 2023-10-20 DIAGNOSIS — R17 SERUM TOTAL BILIRUBIN ELEVATED: Primary | ICD-10-CM

## 2023-10-20 PROCEDURE — 3008F PR BODY MASS INDEX (BMI) DOCUMENTED: ICD-10-PCS | Mod: CPTII,S$GLB,, | Performed by: PHYSICIAN ASSISTANT

## 2023-10-20 PROCEDURE — 3078F PR MOST RECENT DIASTOLIC BLOOD PRESSURE < 80 MM HG: ICD-10-PCS | Mod: CPTII,S$GLB,, | Performed by: PHYSICIAN ASSISTANT

## 2023-10-20 PROCEDURE — 1159F MED LIST DOCD IN RCRD: CPT | Mod: CPTII,S$GLB,, | Performed by: PHYSICIAN ASSISTANT

## 2023-10-20 PROCEDURE — 3074F PR MOST RECENT SYSTOLIC BLOOD PRESSURE < 130 MM HG: ICD-10-PCS | Mod: CPTII,S$GLB,, | Performed by: PHYSICIAN ASSISTANT

## 2023-10-20 PROCEDURE — 99214 PR OFFICE/OUTPT VISIT, EST, LEVL IV, 30-39 MIN: ICD-10-PCS | Mod: S$GLB,,, | Performed by: PHYSICIAN ASSISTANT

## 2023-10-20 PROCEDURE — 76700 US EXAM ABDOM COMPLETE: CPT | Mod: TC

## 2023-10-20 PROCEDURE — 76700 US ABDOMEN COMPLETE: ICD-10-PCS | Mod: 26,,, | Performed by: RADIOLOGY

## 2023-10-20 PROCEDURE — 3008F BODY MASS INDEX DOCD: CPT | Mod: CPTII,S$GLB,, | Performed by: PHYSICIAN ASSISTANT

## 2023-10-20 PROCEDURE — 99214 OFFICE O/P EST MOD 30 MIN: CPT | Mod: S$GLB,,, | Performed by: PHYSICIAN ASSISTANT

## 2023-10-20 PROCEDURE — 99999 PR PBB SHADOW E&M-EST. PATIENT-LVL III: CPT | Mod: PBBFAC,,, | Performed by: PHYSICIAN ASSISTANT

## 2023-10-20 PROCEDURE — 1159F PR MEDICATION LIST DOCUMENTED IN MEDICAL RECORD: ICD-10-PCS | Mod: CPTII,S$GLB,, | Performed by: PHYSICIAN ASSISTANT

## 2023-10-20 PROCEDURE — 3074F SYST BP LT 130 MM HG: CPT | Mod: CPTII,S$GLB,, | Performed by: PHYSICIAN ASSISTANT

## 2023-10-20 PROCEDURE — 3044F HG A1C LEVEL LT 7.0%: CPT | Mod: CPTII,S$GLB,, | Performed by: PHYSICIAN ASSISTANT

## 2023-10-20 PROCEDURE — 76700 US EXAM ABDOM COMPLETE: CPT | Mod: 26,,, | Performed by: RADIOLOGY

## 2023-10-20 PROCEDURE — 3078F DIAST BP <80 MM HG: CPT | Mod: CPTII,S$GLB,, | Performed by: PHYSICIAN ASSISTANT

## 2023-10-20 PROCEDURE — 3044F PR MOST RECENT HEMOGLOBIN A1C LEVEL <7.0%: ICD-10-PCS | Mod: CPTII,S$GLB,, | Performed by: PHYSICIAN ASSISTANT

## 2023-10-20 PROCEDURE — 99999 PR PBB SHADOW E&M-EST. PATIENT-LVL III: ICD-10-PCS | Mod: PBBFAC,,, | Performed by: PHYSICIAN ASSISTANT

## 2023-10-20 RX ORDER — METHOCARBAMOL 500 MG/1
500 TABLET, FILM COATED ORAL NIGHTLY
Qty: 10 TABLET | Refills: 0 | Status: SHIPPED | OUTPATIENT
Start: 2023-10-20 | End: 2023-10-30

## 2023-10-20 NOTE — Clinical Note
Do you think this bilirubin elevation likely represents Gilbert's? Do we formally dx that or do we refer to hepatology? -acm

## 2023-10-20 NOTE — PROGRESS NOTES
INTERNAL MEDICINE URGENT VISIT NOTE    CHIEF COMPLAINT     Chief Complaint   Patient presents with    Results     Liver       HPI     Daphne Sullivan is a 33 y.o. female who presents for an urgent visit today.    PCP is Indigo Meneses MD, patient is known to me.   She had annual labs drawn and total bilirubin was elevated. Labs were repeated and bilirubin was still elevated. She has no pain or concerning symptoms however she does have persistent headaches for which she takes tylenol 3-4 times weekly. She has had HA evaluations in the past and pt reports that it was determined that HA's are related to tension.   No fever, chills, nausea, vomiting, dizziness, skin color changes, unexpected bleeding.   She hydrates well  No family history of liver disease  No dietary supplements taken   Plays soccer weekly - no limitations to physical exertion.     Past Medical History:  Past Medical History:   Diagnosis Date    Anxiety disorder        Home Medications:  Prior to Admission medications    Medication Sig Start Date End Date Taking? Authorizing Provider   norethindrone-ethinyl estradiol (BLISOVI FE 1/20, 28,) 1 mg-20 mcg (21)/75 mg (7) per tablet Take 1 tablet by mouth once daily. 10/19/23  Yes Jennifer Ruvalcaba MD   gabapentin (NEURONTIN) 300 MG capsule Take 1 capsule (300 mg total) by mouth 3 (three) times daily. Take 1 at night, if tolerating and not excessively drowsy can take 2-3 times a day 1/11/23 5/11/23  Samia Malave MD       Review of Systems:  Review of Systems   Constitutional:  Negative for chills and fever.   HENT:  Negative for sore throat and trouble swallowing.    Eyes:  Negative for visual disturbance.   Respiratory:  Negative for cough and shortness of breath.    Cardiovascular:  Negative for chest pain.   Gastrointestinal:  Negative for abdominal pain, constipation, diarrhea, nausea and vomiting.   Genitourinary:  Negative for dysuria and flank pain.   Musculoskeletal:   "Negative for back pain, neck pain and neck stiffness.   Skin:  Negative for rash.   Neurological:  Negative for dizziness, syncope, weakness and headaches.   Psychiatric/Behavioral:  Negative for confusion.        Health Maintainence:   Immunizations:  Health Maintenance         Date Due Completion Date    Influenza Vaccine (1) Never done ---    COVID-19 Vaccine (3 - 2023-24 season) 09/01/2023 12/15/2021    Cervical Cancer Screening 07/26/2026 7/26/2021    TETANUS VACCINE 05/06/2029 5/6/2019             PHYSICAL EXAM     /76 (BP Location: Left arm, Patient Position: Sitting, BP Method: Medium (Manual))   Pulse (!) 153   Ht 5' 2" (1.575 m)   Wt 57.5 kg (126 lb 12.2 oz)   LMP 09/20/2023   SpO2 (!) 92%   BMI 23.19 kg/m²     Physical Exam  Vitals and nursing note reviewed.   Constitutional:       Appearance: Normal appearance.      Comments: Healthy appearing female in NAD or apparent pain. She makes good eye contact, speaks in clear full sentences and ambulates with ease.        HENT:      Head: Normocephalic and atraumatic.      Nose: Nose normal.      Mouth/Throat:      Pharynx: Oropharynx is clear.   Eyes:      Conjunctiva/sclera: Conjunctivae normal.   Cardiovascular:      Rate and Rhythm: Normal rate and regular rhythm.      Pulses: Normal pulses.   Pulmonary:      Effort: No respiratory distress.   Musculoskeletal:         General: Normal range of motion.      Cervical back: No rigidity.   Skin:     General: Skin is warm and dry.      Capillary Refill: Capillary refill takes less than 2 seconds.      Findings: No rash.   Neurological:      General: No focal deficit present.      Mental Status: She is alert.      Gait: Gait normal.   Psychiatric:         Mood and Affect: Mood normal.         LABS     Lab Results   Component Value Date    HGBA1C 5.1 10/10/2023     CMP  Sodium   Date Value Ref Range Status   10/10/2023 140 136 - 145 mmol/L Final     Potassium   Date Value Ref Range Status   10/10/2023 " 4.1 3.5 - 5.1 mmol/L Final     Chloride   Date Value Ref Range Status   10/10/2023 108 95 - 110 mmol/L Final     CO2   Date Value Ref Range Status   10/10/2023 23 23 - 29 mmol/L Final     Glucose   Date Value Ref Range Status   10/10/2023 90 70 - 110 mg/dL Final     BUN   Date Value Ref Range Status   10/10/2023 11 6 - 20 mg/dL Final     Creatinine   Date Value Ref Range Status   10/10/2023 1.0 0.5 - 1.4 mg/dL Final     Calcium   Date Value Ref Range Status   10/10/2023 9.1 8.7 - 10.5 mg/dL Final     Total Protein   Date Value Ref Range Status   10/18/2023 7.0 6.0 - 8.4 g/dL Final     Albumin   Date Value Ref Range Status   10/18/2023 4.0 3.5 - 5.2 g/dL Final     Total Bilirubin   Date Value Ref Range Status   10/18/2023 1.1 (H) 0.1 - 1.0 mg/dL Final     Comment:     For infants and newborns, interpretation of results should be based  on gestational age, weight and in agreement with clinical  observations.    Premature Infant recommended reference ranges:  Up to 24 hours.............<8.0 mg/dL  Up to 48 hours............<12.0 mg/dL  3-5 days..................<15.0 mg/dL  6-29 days.................<15.0 mg/dL       Alkaline Phosphatase   Date Value Ref Range Status   10/18/2023 48 (L) 55 - 135 U/L Final     AST   Date Value Ref Range Status   10/18/2023 16 10 - 40 U/L Final     ALT   Date Value Ref Range Status   10/18/2023 10 10 - 44 U/L Final     Anion Gap   Date Value Ref Range Status   10/10/2023 9 8 - 16 mmol/L Final     eGFR if    Date Value Ref Range Status   01/06/2011 >60 >60 mL/min Final     Comment:     Estimated glomerular filtration rate (eGFR) is normalized to an  average body surface area of 1.73 square meters.  The calculation  used to obtain the eGFR is the adjusted MDRD equation, which factors  patient sex, age, race, and creatinine result.  Since race is unknown  in our information system, the eGFR values for -American  and Non--American patients are given for each  creatinine  result.     eGFR if non    Date Value Ref Range Status   01/06/2011 >60 >60 mL/min Final     Lab Results   Component Value Date    WBC 4.66 10/10/2023    HGB 13.8 10/10/2023    HCT 41.8 10/10/2023    MCV 95 10/10/2023     10/10/2023     Lab Results   Component Value Date    CHOL 153 10/10/2023    CHOL 160 01/06/2011     Lab Results   Component Value Date    HDL 56 10/10/2023    HDL 64 01/06/2011     Lab Results   Component Value Date    LDLCALC 79.0 10/10/2023    LDLCALC 77.6 01/06/2011     Lab Results   Component Value Date    TRIG 90 10/10/2023    TRIG 92 01/06/2011     Lab Results   Component Value Date    CHOLHDL 36.6 10/10/2023    CHOLHDL 40.0 01/06/2011     Lab Results   Component Value Date    TSH 2.391 10/10/2023       ASSESSMENT/PLAN     Daphne Sullivan is a 33 y.o. female     Daphne was seen today for results.    Diagnoses and all orders for this visit:    Serum total bilirubin elevated  -     US Abdomen Complete; Future    Chronic tension-type headache, not intractable   -start trial of PRN muscle relaxer to help with muscle tension contributing to chronic headaches   -stop tylenol for HA mgmt, switch to OTC NSAIDs    Other orders  -     methocarbamoL (ROBAXIN) 500 MG Tab; Take 1 tablet (500 mg total) by mouth every evening. for 10 days      Patient was counseled on when and how to seek emergent care.       Zena Ernst PA-C   Department of Internal Medicine - [facility]   9:41 AM

## 2023-10-26 LAB
FINAL PATHOLOGIC DIAGNOSIS: NORMAL
Lab: NORMAL

## 2023-12-01 DIAGNOSIS — Z30.8 ENCOUNTER FOR OTHER CONTRACEPTIVE MANAGEMENT: ICD-10-CM

## 2023-12-02 RX ORDER — NORETHINDRONE ACETATE AND ETHINYL ESTRADIOL 1MG-20(21)
1 KIT ORAL
Qty: 84 TABLET | Refills: 3 | Status: SHIPPED | OUTPATIENT
Start: 2023-12-02

## 2023-12-02 NOTE — TELEPHONE ENCOUNTER
Refill Decision Note   Daphne Sullivan  is requesting a refill authorization.  Brief Assessment and Rationale for Refill:  Approve     Medication Therapy Plan:         Comments:     Note composed:9:05 AM 12/02/2023

## 2024-01-05 ENCOUNTER — PATIENT MESSAGE (OUTPATIENT)
Dept: INTERNAL MEDICINE | Facility: CLINIC | Age: 34
End: 2024-01-05
Payer: COMMERCIAL

## 2024-01-05 ENCOUNTER — E-CONSULT (OUTPATIENT)
Dept: UROLOGY | Facility: CLINIC | Age: 34
End: 2024-01-05
Payer: COMMERCIAL

## 2024-01-05 DIAGNOSIS — R17 SERUM TOTAL BILIRUBIN ELEVATED: ICD-10-CM

## 2024-01-05 DIAGNOSIS — N28.1 RENAL CYST: Primary | ICD-10-CM

## 2024-01-05 DIAGNOSIS — N28.1 RENAL CYST, LEFT: Primary | ICD-10-CM

## 2024-01-05 PROCEDURE — 99451 NTRPROF PH1/NTRNET/EHR 5/>: CPT | Mod: S$GLB,,, | Performed by: STUDENT IN AN ORGANIZED HEALTH CARE EDUCATION/TRAINING PROGRAM

## 2024-01-05 NOTE — TELEPHONE ENCOUNTER
4:08 PM discussed labs and US results - will get e-consult from urology for eval of incidental finding of renal cyst. Patient amenable to e-consult. MARBELLA MOODY

## 2024-01-06 NOTE — CONSULTS
[facility] [place] - Urology  Response for E-Consult     Patient Name: Daphne Sullivan  MRN: [id number]  Primary Care Provider: Indigo Meneses MD   Requesting Provider: Zena Ernst PA-C  E-Consult to Urology  Consult performed by: Sirisha Nicole MD  Consult ordered by: Zena Ernst PA-C  Reason for consult: Renal cyst  Assessment/Recommendations: Thank you for your question, it's best practices that all renal cysts require an initial evaluation to ensure no malignant characteristics.  Recommend CT abdomen w/wo contrast renal mass protocol after confirming patient has good renal function. If renal mass concerning for Bosniak > 3 lesion, refrral to urology to review options for management. Bosniak 2f lesions can be followed according to AUA guidelines, due to low risk for malignant potential.           Recommendation: as above    Contingency: as above    Total time of Consultation: 16 minutes    I did not speak to the requesting provider verbally about this.     *This eConsult is based on the clinical data available to me and is furnished without benefit of a physical examination. The eConsult will need to be interpreted in light of any clinical issues or changes in patient status not available to me at the time of filing this eConsults. Significant changes in patient condition or level of acuity should result in immediate formal consultation and reevaluation. Please alert me if you have further questions.    Thank you for this eConsult referral.     Sirisha Nicole MD  [facility] Urology

## 2024-01-08 ENCOUNTER — TELEPHONE (OUTPATIENT)
Dept: INTERNAL MEDICINE | Facility: CLINIC | Age: 34
End: 2024-01-08
Payer: COMMERCIAL

## 2024-01-08 DIAGNOSIS — N28.1 RENAL CYST: Primary | ICD-10-CM

## 2024-01-08 NOTE — TELEPHONE ENCOUNTER
E-consult regarding renal cyst:     Assessment/Recommendations: Thank you for your question, it's best practices that all renal cysts require an initial evaluation to ensure no malignant characteristics.  Recommend CT abdomen w/wo contrast renal mass protocol after confirming patient has good renal function. If renal mass concerning for Bosniak > 3 lesion, refrral to urology to review options for management. Bosniak 2f lesions can be followed according to AUA guidelines, due to low risk for malignant potential.     CT ordered, will ask staff to schedule. MARBELLA MOODY

## 2024-01-10 ENCOUNTER — LAB VISIT (OUTPATIENT)
Dept: LAB | Facility: HOSPITAL | Age: 34
End: 2024-01-10
Payer: COMMERCIAL

## 2024-01-10 DIAGNOSIS — N28.1 RENAL CYST: ICD-10-CM

## 2024-01-10 DIAGNOSIS — R17 SERUM TOTAL BILIRUBIN ELEVATED: ICD-10-CM

## 2024-01-10 LAB
ALBUMIN SERPL BCP-MCNC: 3.9 G/DL (ref 3.5–5.2)
ALBUMIN SERPL BCP-MCNC: 3.9 G/DL (ref 3.5–5.2)
ALP SERPL-CCNC: 50 U/L (ref 55–135)
ALP SERPL-CCNC: 50 U/L (ref 55–135)
ALT SERPL W/O P-5'-P-CCNC: 14 U/L (ref 10–44)
ALT SERPL W/O P-5'-P-CCNC: 14 U/L (ref 10–44)
ANION GAP SERPL CALC-SCNC: 8 MMOL/L (ref 8–16)
AST SERPL-CCNC: 18 U/L (ref 10–40)
AST SERPL-CCNC: 18 U/L (ref 10–40)
BILIRUB DIRECT SERPL-MCNC: 0.5 MG/DL (ref 0.1–0.3)
BILIRUB DIRECT SERPL-MCNC: 0.5 MG/DL (ref 0.1–0.3)
BILIRUB SERPL-MCNC: 1.4 MG/DL (ref 0.1–1)
BILIRUB SERPL-MCNC: 1.4 MG/DL (ref 0.1–1)
BUN SERPL-MCNC: 9 MG/DL (ref 6–20)
CALCIUM SERPL-MCNC: 8.9 MG/DL (ref 8.7–10.5)
CHLORIDE SERPL-SCNC: 107 MMOL/L (ref 95–110)
CO2 SERPL-SCNC: 23 MMOL/L (ref 23–29)
CREAT SERPL-MCNC: 0.9 MG/DL (ref 0.5–1.4)
EST. GFR  (NO RACE VARIABLE): >60 ML/MIN/1.73 M^2
GLUCOSE SERPL-MCNC: 72 MG/DL (ref 70–110)
POTASSIUM SERPL-SCNC: 4.2 MMOL/L (ref 3.5–5.1)
PROT SERPL-MCNC: 6.9 G/DL (ref 6–8.4)
PROT SERPL-MCNC: 6.9 G/DL (ref 6–8.4)
SODIUM SERPL-SCNC: 138 MMOL/L (ref 136–145)

## 2024-01-10 PROCEDURE — 80053 COMPREHEN METABOLIC PANEL: CPT | Performed by: PHYSICIAN ASSISTANT

## 2024-01-10 PROCEDURE — 36415 COLL VENOUS BLD VENIPUNCTURE: CPT | Performed by: PHYSICIAN ASSISTANT

## 2024-02-01 ENCOUNTER — HOSPITAL ENCOUNTER (OUTPATIENT)
Dept: RADIOLOGY | Facility: OTHER | Age: 34
Discharge: HOME | End: 2024-02-01
Attending: PHYSICIAN ASSISTANT
Payer: COMMERCIAL

## 2024-02-01 DIAGNOSIS — N28.1 RENAL CYST: ICD-10-CM

## 2024-02-01 PROCEDURE — 74178 CT ABD&PLV WO CNTR FLWD CNTR: CPT | Mod: TC

## 2024-02-01 PROCEDURE — 25500020 PHARM REV CODE 255: Performed by: PHYSICIAN ASSISTANT

## 2024-02-01 PROCEDURE — 74178 CT ABD&PLV WO CNTR FLWD CNTR: CPT | Mod: 26,,, | Performed by: RADIOLOGY

## 2024-02-01 RX ADMIN — IOHEXOL 75 ML: 350 INJECTION, SOLUTION INTRAVENOUS at 09:02

## 2024-02-06 ENCOUNTER — OFFICE VISIT (OUTPATIENT)
Dept: URGENT CARE | Facility: CLINIC | Age: 34
End: 2024-02-06
Payer: COMMERCIAL

## 2024-02-06 VITALS
TEMPERATURE: 98 F | BODY MASS INDEX: 22.11 KG/M2 | HEART RATE: 54 BPM | OXYGEN SATURATION: 98 % | DIASTOLIC BLOOD PRESSURE: 77 MMHG | SYSTOLIC BLOOD PRESSURE: 120 MMHG | HEIGHT: 62 IN | RESPIRATION RATE: 18 BRPM | WEIGHT: 120.13 LBS

## 2024-02-06 DIAGNOSIS — R05.8 POST-VIRAL COUGH SYNDROME: Primary | ICD-10-CM

## 2024-02-06 PROCEDURE — 99213 OFFICE O/P EST LOW 20 MIN: CPT | Mod: S$GLB,,, | Performed by: FAMILY MEDICINE

## 2024-02-06 RX ORDER — PREDNISONE 20 MG/1
20 TABLET ORAL 2 TIMES DAILY
Qty: 10 TABLET | Refills: 0 | Status: SHIPPED | OUTPATIENT
Start: 2024-02-06 | End: 2024-02-11

## 2024-02-06 NOTE — PATIENT INSTRUCTIONS
Recommend Mucinex DM and fluids and prednisone. If symptoms dont resolve in next week or so then Return for further Evaluation

## 2024-02-06 NOTE — PROGRESS NOTES
"Subjective:      Patient ID: Daphne Sullivan is a 33 y.o. female.    Vitals:  height is 5' 2" (1.575 m) and weight is 54.5 kg (120 lb 2.4 oz). Her oral temperature is 98.4 °F (36.9 °C). Her blood pressure is 120/77 and her pulse is 54 (abnormal). Her respiration is 18 and oxygen saturation is 98%.     Chief Complaint: Chest Congestion    Pt states she is having chest congestion that started 3 days ago, says she is having no other sx and she doesn't have a cough, states that the congestion isn't moving or won't come up. Mucinex was used to help treat but didn't work. Says she feels chest pressure as well with slight pain.    Other  This is a new problem. Episode onset: 3 days. The problem has been unchanged.     ROS   Objective:     Physical Exam   Constitutional: She is oriented to person, place, and time. She appears well-developed. She is cooperative.  Non-toxic appearance. She does not appear ill. No distress.   HENT:   Head: Normocephalic and atraumatic.   Ears:   Right Ear: Hearing, tympanic membrane, external ear and ear canal normal.   Left Ear: Hearing, tympanic membrane, external ear and ear canal normal.   Nose: Nose normal. No mucosal edema, rhinorrhea or nasal deformity. No epistaxis. Right sinus exhibits no maxillary sinus tenderness and no frontal sinus tenderness. Left sinus exhibits no maxillary sinus tenderness and no frontal sinus tenderness.   Mouth/Throat: Uvula is midline, oropharynx is clear and moist and mucous membranes are normal. Mucous membranes are moist. No trismus in the jaw. Normal dentition. No uvula swelling. No oropharyngeal exudate, posterior oropharyngeal edema or posterior oropharyngeal erythema. Oropharynx is clear.   Eyes: Conjunctivae and lids are normal. No scleral icterus.   Neck: Trachea normal and phonation normal. Neck supple. No edema present. No erythema present. No neck rigidity present.   Cardiovascular: Normal rate, regular rhythm, normal heart sounds and " normal pulses.   Pulmonary/Chest: Effort normal. No respiratory distress. She has no decreased breath sounds. She has no rhonchi.         Comments: Mucous plugging noises on left side with wheezing which is relieved with coughing    Abdominal: Normal appearance.   Musculoskeletal: Normal range of motion.         General: No deformity or edema. Normal range of motion.   Neurological: She is alert and oriented to person, place, and time. She exhibits normal muscle tone. Coordination normal.   Skin: Skin is warm, dry, intact, not diaphoretic and not pale.   Psychiatric: Her speech is normal and behavior is normal. Judgment and thought content normal.   Nursing note and vitals reviewed.      Assessment:     1. Post-viral cough syndrome        Plan:       Post-viral cough syndrome  -     predniSONE (DELTASONE) 20 MG tablet; Take 1 tablet (20 mg total) by mouth 2 (two) times daily. for 5 days  Dispense: 10 tablet; Refill: 0

## 2024-02-14 ENCOUNTER — TELEPHONE (OUTPATIENT)
Dept: URGENT CARE | Facility: CLINIC | Age: 34
End: 2024-02-14
Payer: COMMERCIAL

## 2024-02-14 NOTE — TELEPHONE ENCOUNTER
I called and left a message to apologize for the delay in getting back to her about her cough. If she is not improved I recommend she return for further evaluation.

## 2024-04-04 ENCOUNTER — PATIENT MESSAGE (OUTPATIENT)
Dept: OBSTETRICS AND GYNECOLOGY | Facility: CLINIC | Age: 34
End: 2024-04-04

## 2024-04-04 ENCOUNTER — CLINICAL SUPPORT (OUTPATIENT)
Dept: OBSTETRICS AND GYNECOLOGY | Facility: CLINIC | Age: 34
End: 2024-04-04
Payer: COMMERCIAL

## 2024-04-04 DIAGNOSIS — N91.2 AMENORRHEA: Primary | ICD-10-CM

## 2024-04-04 PROCEDURE — 99999 PR PBB SHADOW E&M-EST. PATIENT-LVL II: CPT | Mod: PBBFAC,,,

## 2024-04-04 NOTE — PROGRESS NOTES
Spoke with patient for a total of 30 minutes during virtual visit.  Updated chart to reflect up to date patient demographics.  Allergies, medications, pharmacy, medical/family history and OB history updated.  Patient was guided through expectations of care during pregnancy.  Pregnancy confirmation, dating u/s & first routine OB appts scheduled.  Education provided & questions answered. Encouraged to send message or call office with any questions/concerns. Verbalized understanding.     Discussed with pt:    taking PNV   C/o nausea/no vomiting  C/o mild cramping/no spotting  Precautions discussed  Referred to [email or web address] for Preg A to Z guide & class schedule   Discussed benefits of breastfeeding    Discussed need for pediatrician  MARY JO

## 2024-04-09 ENCOUNTER — OFFICE VISIT (OUTPATIENT)
Dept: OBSTETRICS AND GYNECOLOGY | Facility: CLINIC | Age: 34
End: 2024-04-09
Payer: COMMERCIAL

## 2024-04-09 ENCOUNTER — HOSPITAL ENCOUNTER (OUTPATIENT)
Dept: PERINATAL CARE | Facility: OTHER | Age: 34
Discharge: HOME | End: 2024-04-09
Payer: COMMERCIAL

## 2024-04-09 VITALS
WEIGHT: 117.94 LBS | DIASTOLIC BLOOD PRESSURE: 66 MMHG | HEIGHT: 62 IN | BODY MASS INDEX: 21.7 KG/M2 | SYSTOLIC BLOOD PRESSURE: 110 MMHG

## 2024-04-09 DIAGNOSIS — N91.2 AMENORRHEA: Primary | ICD-10-CM

## 2024-04-09 DIAGNOSIS — Z11.3 SCREENING FOR STD (SEXUALLY TRANSMITTED DISEASE): ICD-10-CM

## 2024-04-09 DIAGNOSIS — Z32.01 POSITIVE PREGNANCY TEST: ICD-10-CM

## 2024-04-09 DIAGNOSIS — N91.2 AMENORRHEA: ICD-10-CM

## 2024-04-09 PROCEDURE — 99999 PR PBB SHADOW E&M-EST. PATIENT-LVL III: CPT | Mod: PBBFAC,,,

## 2024-04-09 PROCEDURE — 1159F MED LIST DOCD IN RCRD: CPT | Mod: CPTII,S$GLB,,

## 2024-04-09 PROCEDURE — 3074F SYST BP LT 130 MM HG: CPT | Mod: CPTII,S$GLB,,

## 2024-04-09 PROCEDURE — 76801 OB US < 14 WKS SINGLE FETUS: CPT

## 2024-04-09 PROCEDURE — 99214 OFFICE O/P EST MOD 30 MIN: CPT | Mod: S$GLB,,,

## 2024-04-09 PROCEDURE — 76801 OB US < 14 WKS SINGLE FETUS: CPT | Mod: 26,,, | Performed by: OBSTETRICS & GYNECOLOGY

## 2024-04-09 PROCEDURE — 76802 OB US < 14 WKS ADDL FETUS: CPT

## 2024-04-09 PROCEDURE — 87086 URINE CULTURE/COLONY COUNT: CPT

## 2024-04-09 PROCEDURE — 3078F DIAST BP <80 MM HG: CPT | Mod: CPTII,S$GLB,,

## 2024-04-09 PROCEDURE — 87491 CHLMYD TRACH DNA AMP PROBE: CPT

## 2024-04-09 PROCEDURE — 3008F BODY MASS INDEX DOCD: CPT | Mod: CPTII,S$GLB,,

## 2024-04-09 PROCEDURE — 1160F RVW MEDS BY RX/DR IN RCRD: CPT | Mod: CPTII,S$GLB,,

## 2024-04-09 NOTE — PATIENT INSTRUCTIONS
1) Eat small frequent meals through the day versus three large meals  2) Try ginger ale or sprite to help settle the stomach   3) Eat crackers or dry toast before getting out of bed in the morning   4) Stay hydrated by drinking plenty of water-do not immediately eat or drink something after vomiting. Give your stomach a rest for 20-30 minutes. Slowly reintroduce ice chips, small sips water, crackers, etc.    5) Try OTC Unisom (use the tablets that have doxylamine not diphenhydramine in them, can use generic brands like Equate) and vitamin B6  (25 mg, can find on [employer]) together at night before bed. This can help with the nausea in the morning and is safe to use during pregnancy. You can also take the vitamin B6 alone, every 8 hours to help with the nausea.     If you are unable to keep anything down and constantly vomiting for more that 24 hours, let the office know so that dehydration can be avoided. We would recommend being seen in the emergency department if this is the case.      Call [phone] to see about coverage and any out of pocket costs regarding the Mtswjlzrn25 (MT21) testing. This is done any day after 11 weeks and is blood work only. It checks for any chromosomal abnormalities like Down Syndrome. You can also find out the sex of the baby if you choose to know. Once you find out coverage and decide to proceed, send either myself or your doctor a message and we can see what date you can do it. It is done at our second floor lab at [facility], [place], operated by [facility].      The sequential screen is a test done around 12-14 weeks that consists of an ultrasound that measures the nuchal fold (neck thickness) of baby and blood work on the same day. You get a second set of labs done a few weeks later after 15 weeks. Based on all three of these results, they are able to tell you if you are considered to be low risk or high risk regarding neural tube defects and chromosomal abnormalities like Down Syndrome. If you are at all interested,  I usually place the order today so we do not miss the window. Someone will give you a phone call in a week or two to schedule this. If you do not want it, just tell them no thank you. This test is typically covered by your insurance and is performed by the Maternal Fetal Medicine (MFM) department here at [facility]. It will not tell you the sex of the baby.     Call clinic [phone] or L & D after hours at [phone]

## 2024-04-09 NOTE — PROGRESS NOTES
CC: Positive Pregnancy Test    HISTORY OF PRESENT ILLNESS:    Daphne Sullivan is a 33 y.o. female, ,  Presents today for a routine exam complaining of amenorrhea and positive home urine pregnancy test.  Patient's last menstrual period was 2024.  Pt reports one normal period after coming off of BC.  She is not currently on any contraception. Reports some nausea. Reports breast tenderness. Denies pelvic pain. This is her first pregnancy.     LMP: 24  EGA: 9w5d (per ultrasound)  EDC: 24 (per ultrasound)    Indication   ========   Indication: Estimation of Gestational Age     Method   ======   Voluson S8, Transabdominal and transvaginal ultrasound examination. View: Good view     Pregnancy   =========   Sierra pregnancy. Number of embryos: 1     Dating   ======   Ultrasound examination on: 2024   GA by U/S based upon: CRL   GA by U/S 9 w + 5 d   ERICA by U/S: 2024   Assigned: based on ultrasound (CRL), selected on 2024   Assigned GA 9 w + 5 d   Assigned ERICA: 2024     Assessment   ==========   Gestational sac: visualized   Location: intrauterine   Yolk sac: visualized   Amniotic sac: visualized   Embryo: visualized   CRL 29.1 mm 9w 5d Hadlock   Cardiac activity: present    bpm     Maternal Structures   ===============   Uterus / Cervix   Uterus: Normal   Cervix: Visualized   Approach: Transvaginal   Ovaries / Tubes / Adnexa   Rt ovary: Normal   Rt ovary D1 22 mm   Rt ovary D2 21 mm   Rt ovary D3 10 mm   Rt ovary Vol 2.4 cmÂ³   Lt ovary: Normal   Lt ovary D1 31 mm   Lt ovary D2 31 mm   Lt ovary D3 21 mm   Lt ovary Vol 10.5 cmÂ³   Lt ovarian corpus luteum: visualized   Lt ovarian corpus luteum D1 21.0 mm   Lt ovarian corpus luteum D2 16.0 mm   Lt ovarian corpus luteum D3 19.0 mm   Cul de Sac / Bladder / Kidneys / Other   Free fluid: No free fluid visualized     Impression   =========   A sierra living IUP is identified. ERICA is assigned based on the CRL from today?s  "study. If other clinical data, such as IVF conception dating or prior ultrasound   assignment of ERICA differs from the ERICA assigned today, please contact the [facility] department so that this report can be revised to reflect the correct ERICA.   The maternal adnexae are normal in appearance. The amount of free fluid seen in the pelvis is within normal physiologic range.     ROS:  GENERAL: No weight changes. No swelling. No fatigue. No fever.  CARDIOVASCULAR: No chest pain. No shortness of breath. No leg cramps.   NEUROLOGICAL: No headaches. No vision changes.  BREASTS: No pain. No lumps. No discharge.  ABDOMEN: No pain. No diarrhea. No constipation.  REPRODUCTIVE: No abnormal bleeding.   VULVA: No pain. No lesions. No itching.  VAGINA: No relaxation. No itching. No odor. No discharge. No lesions.  URINARY: No incontinence. No nocturia. No frequency. No dysuria.    MEDICATIONS AND ALLERGIES:  Reviewed    COMPREHENSIVE GYN HISTORY:  PAP History: Denies abnormal Paps. Last pap 10/19/23- NILM  Infection History: Denies STDs. Denies PID.  Benign History: Denies uterine fibroids. Denies ovarian cysts. Denies endometriosis. Denies other conditions.  Cancer History: Denies cervical cancer. Denies uterine cancer or hyperplasia. Denies ovarian cancer. Denies vulvar cancer or pre-cancer. Denies vaginal cancer or pre-cancer. Denies breast cancer. Denies colon cancer.  Sexual Activity History: Reports currently being sexually active  Menstrual History: None.  Contraception: None    /66   Ht 5' 2" (1.575 m)   Wt 53.5 kg (117 lb 15.1 oz)   LMP 01/27/2024   BMI 21.57 kg/m²     PE:  AFFECT: Calm, alert and oriented X 3. Interactive during exam  GENERAL: Appears well-nourished, well-developed, in no acute distress.  HEAD: Normocephalic, atruamatic  TEETH: Good dentition.  SKIN: Normal for race, warm, & dry. No lesions or rashes.  LUNGS: Easy and unlabored  HEART: Regular rate  EXTREMITIES: No cyanosis, clubbing or edema. No calf " tenderness.    PROCEDURES:  UPT Positive  Genprobe  Pap up to date     ASSESSMENT/PLAN:  Amenorrhea  Positive urine pregnancy test (ERICA: 24, EGA: 9w5d based on LMP)    -  Routine prenatal care    Nausea and vomiting in pregnancy    -  Education regarding lifestyle and dietary modifications    -  Advised use of B6/Unisom. Pt will notify us if no relief/worsening symptoms    1st TRIMESTER COUNSELING: Discussed all, booklet provided:  Common complaints of pregnancy  HIV and other routine prenatal tests including  genetic screening  Risk factors identified by prenatal history  Oriented to practice - discussed anticipated course of prenatal care & indications for Ultrasound  Childbirth classes/Hospital facilities   Nutrition and weight gain counseling  Toxoplasmosis precautions (Cats/Raw Meat)  Sexual activity and exercise  Environmental/Work hazards  Travel  Tobacco (Ask, Advise, Assess, Assist, and Arrange), as well as alcohol and drug use  Use of any medications (Including supplements, Vitamins, Herbs, or OTC Drugs)  Domestic violence  Seat belt use    TERATOLOGY COUNSELING:   Discussed indications and options for aneuploidy screening - pamphlets given    -  Pt desires MT21- will call insurance     PLAN:  - Dating ultrasound today   - Urine culture sent  - GC/CT collected  - 1T labs ordered   - Pap up to date      FOLLOW-UP in 4 weeks with Dr. Jordon Barba, NP    OB/GYN

## 2024-04-10 LAB
BACTERIA UR CULT: NORMAL
C TRACH DNA SPEC QL NAA+PROBE: NOT DETECTED
N GONORRHOEA DNA SPEC QL NAA+PROBE: NOT DETECTED

## 2024-04-29 ENCOUNTER — PATIENT MESSAGE (OUTPATIENT)
Dept: OBSTETRICS AND GYNECOLOGY | Facility: CLINIC | Age: 34
End: 2024-04-29
Payer: COMMERCIAL

## 2024-05-08 ENCOUNTER — TELEPHONE (OUTPATIENT)
Dept: OBSTETRICS AND GYNECOLOGY | Facility: CLINIC | Age: 34
End: 2024-05-08
Payer: COMMERCIAL

## 2024-05-08 NOTE — TELEPHONE ENCOUNTER
I attempt reaching patient by phone to discuss results. Voicemail message left to call the office.

## 2024-05-08 NOTE — TELEPHONE ENCOUNTER
----- Message from Emily Shipman sent at 5/8/2024  4:04 PM CDT -----  Regarding: Test results  Type:  Patient Returning Call    Who Called: p/t     Who Left Message for Patient: Anay     Does the patient know what this is regarding?: Test results     Would the patient rather a call back or a response via [facility]?     Best Call Back Number: [phone] (home)       Additional Information:

## 2024-05-08 NOTE — TELEPHONE ENCOUNTER
Spoke with patient and discuss [id number] results- Patient will received privacy envelope with fetal sex during office visit.

## 2024-05-09 ENCOUNTER — INITIAL PRENATAL (OUTPATIENT)
Dept: OBSTETRICS AND GYNECOLOGY | Facility: CLINIC | Age: 34
End: 2024-05-09
Attending: OBSTETRICS & GYNECOLOGY
Payer: COMMERCIAL

## 2024-05-09 ENCOUNTER — PATIENT MESSAGE (OUTPATIENT)
Dept: MATERNAL FETAL MEDICINE | Facility: CLINIC | Age: 34
End: 2024-05-09
Payer: COMMERCIAL

## 2024-05-09 VITALS
WEIGHT: 117.06 LBS | DIASTOLIC BLOOD PRESSURE: 62 MMHG | BODY MASS INDEX: 21.41 KG/M2 | SYSTOLIC BLOOD PRESSURE: 100 MMHG

## 2024-05-09 DIAGNOSIS — Z34.02 ENCOUNTER FOR SUPERVISION OF NORMAL FIRST PREGNANCY IN SECOND TRIMESTER: Primary | ICD-10-CM

## 2024-05-09 DIAGNOSIS — O09.512 PRIMIGRAVIDA OF ADVANCED MATERNAL AGE IN SECOND TRIMESTER: ICD-10-CM

## 2024-05-09 PROBLEM — N91.2 AMENORRHEA: Status: RESOLVED | Noted: 2024-04-09 | Resolved: 2024-05-09

## 2024-05-09 PROCEDURE — 99999 PR PBB SHADOW E&M-EST. PATIENT-LVL III: CPT | Mod: PBBFAC,,, | Performed by: OBSTETRICS & GYNECOLOGY

## 2024-05-09 PROCEDURE — 0500F INITIAL PRENATAL CARE VISIT: CPT | Mod: CPTII,S$GLB,, | Performed by: OBSTETRICS & GYNECOLOGY

## 2024-05-09 NOTE — PROGRESS NOTES
Reviewed OB labs, u/s, EDC, genetic testing, CM kit, 81 mg ASA daily. JENNIFER HERNANDEZ. Discussed hot yoga and soccer.  
L UE NWB in sling/Right UE ROM was WFL (within functional limits)/bilateral lower extremity ROM was WFL (within functional limits)

## 2024-05-13 ENCOUNTER — PATIENT MESSAGE (OUTPATIENT)
Dept: ADMINISTRATIVE | Facility: OTHER | Age: 34
End: 2024-05-13
Payer: COMMERCIAL

## 2024-05-17 ENCOUNTER — ROUTINE PRENATAL (OUTPATIENT)
Dept: OBSTETRICS AND GYNECOLOGY | Facility: CLINIC | Age: 34
End: 2024-05-17
Payer: COMMERCIAL

## 2024-05-17 ENCOUNTER — TELEPHONE (OUTPATIENT)
Dept: OBSTETRICS AND GYNECOLOGY | Facility: CLINIC | Age: 34
End: 2024-05-17
Payer: COMMERCIAL

## 2024-05-17 VITALS — BODY MASS INDEX: 21.41 KG/M2 | DIASTOLIC BLOOD PRESSURE: 52 MMHG | WEIGHT: 117.06 LBS | SYSTOLIC BLOOD PRESSURE: 90 MMHG

## 2024-05-17 DIAGNOSIS — N39.0 UTI (URINARY TRACT INFECTION), UNCOMPLICATED: Primary | ICD-10-CM

## 2024-05-17 DIAGNOSIS — Z34.02 ENCOUNTER FOR SUPERVISION OF NORMAL FIRST PREGNANCY IN SECOND TRIMESTER: ICD-10-CM

## 2024-05-17 DIAGNOSIS — O09.512 PRIMIGRAVIDA OF ADVANCED MATERNAL AGE IN SECOND TRIMESTER: ICD-10-CM

## 2024-05-17 LAB
BACTERIA #/AREA URNS AUTO: NORMAL /HPF
BILIRUB UR QL STRIP: ABNORMAL
CLARITY UR REFRACT.AUTO: CLEAR
COLOR UR AUTO: ABNORMAL
GLUCOSE UR QL STRIP: NEGATIVE
HGB UR QL STRIP: NEGATIVE
KETONES UR QL STRIP: NEGATIVE
LEUKOCYTE ESTERASE UR QL STRIP: NEGATIVE
MICROSCOPIC COMMENT: NORMAL
NITRITE UR QL STRIP: POSITIVE
PH UR STRIP: 6 [PH] (ref 5–8)
PROT UR QL STRIP: NEGATIVE
RBC #/AREA URNS AUTO: 0 /HPF (ref 0–4)
SP GR UR STRIP: 1.02 (ref 1–1.03)
SQUAMOUS #/AREA URNS AUTO: 1 /HPF
URN SPEC COLLECT METH UR: ABNORMAL
WBC #/AREA URNS AUTO: 0 /HPF (ref 0–5)

## 2024-05-17 PROCEDURE — 99213 OFFICE O/P EST LOW 20 MIN: CPT | Mod: S$GLB,,, | Performed by: OBSTETRICS & GYNECOLOGY

## 2024-05-17 PROCEDURE — 87086 URINE CULTURE/COLONY COUNT: CPT | Performed by: OBSTETRICS & GYNECOLOGY

## 2024-05-17 PROCEDURE — 99999 PR PBB SHADOW E&M-EST. PATIENT-LVL III: CPT | Mod: PBBFAC,,, | Performed by: OBSTETRICS & GYNECOLOGY

## 2024-05-17 PROCEDURE — 81001 URINALYSIS AUTO W/SCOPE: CPT | Performed by: OBSTETRICS & GYNECOLOGY

## 2024-05-17 RX ORDER — NITROFURANTOIN 25; 75 MG/1; MG/1
100 CAPSULE ORAL 2 TIMES DAILY
Qty: 14 CAPSULE | Refills: 0 | Status: SHIPPED | OUTPATIENT
Start: 2024-05-17 | End: 2024-05-24

## 2024-05-17 NOTE — TELEPHONE ENCOUNTER
OB patient reports UTI symptoms- took a cranberry pill and felt a slight relief. I offered her an appointment to evaluate symptoms.Patient scheduled 05/17/2024

## 2024-05-18 NOTE — PROGRESS NOTES
Chief Complaint   Patient presents with    Urinary Frequency       33 y.o., at 15w2d by Estimated Date of Delivery: 24    Complaints today: urinary frequency. Reports 1 day of urinary frequency. Denies prior UTI. No fever, hematuria, dysura    ROS  OBSTETRICS:   Contractions none   Bleeding none   Loss of fluid none   Fetal mvmnt none  GASTRO:   Nausea none   Vomiting none      OB History    Para Term  AB Living   1         0   SAB IAB Ectopic Multiple Live Births           0      # Outcome Date GA Lbr Carlos/2nd Weight Sex Type Anes PTL Lv   1 Current                Dating reviewed  Allergies and problem list reviewed and updated  Medical and surgical history reviewed  Prenatal labs reviewed and updated    PHYSICAL EXAM  BP (!) 90/52   Wt 53.1 kg (117 lb 1 oz)   LMP 2024   BMI 21.41 kg/m²     GENERAL: No acute distress  HEENT: Normocephalic  NEURO: Alert and oriented x3  PSYCH: Normal mood and affect  PULMONARY: Non-labored respiration; no tachypnea  ABD: Soft, gravid, nontender; no hernia or hepatosplenomegaly    ASSESSMENT AND PLAN    Daphne Sullivan Problems (from 24 to present)       No problems associated with this episode.          UTI    urine dip- nitrites      Discussed presumed UTI. Plan oral ABX. Hydrate. Increase oral intake. Discussed prevention of UTI  Follow-up: 4 weeks

## 2024-05-19 LAB — BACTERIA UR CULT: NORMAL

## 2024-05-23 ENCOUNTER — PATIENT MESSAGE (OUTPATIENT)
Dept: OTHER | Facility: OTHER | Age: 34
End: 2024-05-23
Payer: COMMERCIAL

## 2024-05-30 ENCOUNTER — PATIENT MESSAGE (OUTPATIENT)
Dept: OTHER | Facility: OTHER | Age: 34
End: 2024-05-30
Payer: COMMERCIAL

## 2024-06-06 ENCOUNTER — LAB VISIT (OUTPATIENT)
Dept: LAB | Facility: OTHER | Age: 34
End: 2024-06-06
Attending: INTERNAL MEDICINE
Payer: COMMERCIAL

## 2024-06-06 ENCOUNTER — ROUTINE PRENATAL (OUTPATIENT)
Dept: OBSTETRICS AND GYNECOLOGY | Facility: CLINIC | Age: 34
End: 2024-06-06
Payer: COMMERCIAL

## 2024-06-06 VITALS
DIASTOLIC BLOOD PRESSURE: 56 MMHG | BODY MASS INDEX: 22.58 KG/M2 | SYSTOLIC BLOOD PRESSURE: 108 MMHG | WEIGHT: 123.44 LBS

## 2024-06-06 DIAGNOSIS — Z3A.18 18 WEEKS GESTATION OF PREGNANCY: Primary | ICD-10-CM

## 2024-06-06 DIAGNOSIS — Z36.3 ENCOUNTER FOR ANTENATAL SCREENING FOR MALFORMATION: ICD-10-CM

## 2024-06-06 DIAGNOSIS — Z34.02 ENCOUNTER FOR SUPERVISION OF NORMAL FIRST PREGNANCY IN SECOND TRIMESTER: ICD-10-CM

## 2024-06-06 PROCEDURE — 0502F SUBSEQUENT PRENATAL CARE: CPT | Mod: CPTII,S$GLB,, | Performed by: FAMILY MEDICINE

## 2024-06-06 PROCEDURE — 99999 PR PBB SHADOW E&M-EST. PATIENT-LVL III: CPT | Mod: PBBFAC,,, | Performed by: FAMILY MEDICINE

## 2024-06-06 PROCEDURE — 36415 COLL VENOUS BLD VENIPUNCTURE: CPT | Performed by: FAMILY MEDICINE

## 2024-06-06 PROCEDURE — 82105 ALPHA-FETOPROTEIN SERUM: CPT | Performed by: FAMILY MEDICINE

## 2024-06-06 NOTE — PROGRESS NOTES
Here for routine OB appt at 18w0d, mild-mod int HAs, asx with them. BP normal. Discussed s/s to monitor for and home txs. Will improve with fluids.  Denies VB and cramping. No ctx, LOF. No FM yet. Anatomy scheduled. MT21 done, will do AFP today.  Pain, bleeding, and PTL precautions given.  F/U scheduled 4 weeks

## 2024-06-06 NOTE — PATIENT INSTRUCTIONS
LABOR AND DELIVERY PHONE NUMBER, [phone] (OPEN 24/7, LOCATED ON 6TH FLOOR OF HOSPITAL)  [address] PHONE NUMBER, [phone] (OPEN MON-FRI, 8a-5p)

## 2024-06-10 ENCOUNTER — PATIENT MESSAGE (OUTPATIENT)
Dept: INTERNAL MEDICINE | Facility: CLINIC | Age: 34
End: 2024-06-10
Payer: COMMERCIAL

## 2024-06-10 LAB
# FETUSES US: NORMAL
AFP INTERPRETATION: NORMAL
AFP MOM SERPL: 1.8
AFP SERPL-MCNC: 89.3 NG/ML
AFP SERPL-MCNC: NEGATIVE NG/ML
AGE AT DELIVERY: 36
GA (DAYS): 0 D
GA (WEEKS): 18 WK
GESTATIONAL AGE METHOD: NORMAL
IDDM PATIENT QL: NORMAL
SMOKING STATUS FTND: NO

## 2024-06-14 ENCOUNTER — PROCEDURE VISIT (OUTPATIENT)
Dept: MATERNAL FETAL MEDICINE | Facility: CLINIC | Age: 34
End: 2024-06-14
Attending: OBSTETRICS & GYNECOLOGY
Payer: COMMERCIAL

## 2024-06-14 DIAGNOSIS — Z34.02 ENCOUNTER FOR SUPERVISION OF NORMAL FIRST PREGNANCY IN SECOND TRIMESTER: ICD-10-CM

## 2024-06-14 DIAGNOSIS — Z36.2 ENCOUNTER FOR FOLLOW-UP ULTRASOUND OF FETAL ANATOMY: Primary | ICD-10-CM

## 2024-06-14 DIAGNOSIS — O09.512 PRIMIGRAVIDA OF ADVANCED MATERNAL AGE IN SECOND TRIMESTER: ICD-10-CM

## 2024-06-14 PROCEDURE — 76811 OB US DETAILED SNGL FETUS: CPT | Mod: S$GLB,,, | Performed by: OBSTETRICS & GYNECOLOGY

## 2024-06-20 ENCOUNTER — PATIENT MESSAGE (OUTPATIENT)
Dept: OBSTETRICS AND GYNECOLOGY | Facility: CLINIC | Age: 34
End: 2024-06-20
Payer: COMMERCIAL

## 2024-06-20 ENCOUNTER — PATIENT MESSAGE (OUTPATIENT)
Dept: OTHER | Facility: OTHER | Age: 34
End: 2024-06-20
Payer: COMMERCIAL

## 2024-06-20 RX ORDER — HYDROCORTISONE ACETATE PRAMOXINE HCL 2.5; 1 G/100G; G/100G
CREAM TOPICAL 3 TIMES DAILY
Qty: 30 G | Refills: 0 | Status: SHIPPED | OUTPATIENT
Start: 2024-06-20

## 2024-06-25 ENCOUNTER — OFFICE VISIT (OUTPATIENT)
Dept: OBSTETRICS AND GYNECOLOGY | Facility: CLINIC | Age: 34
End: 2024-06-25
Payer: COMMERCIAL

## 2024-06-25 DIAGNOSIS — Z71.9 ENCOUNTER FOR EDUCATION: Primary | ICD-10-CM

## 2024-06-25 PROCEDURE — 99999 PR PBB SHADOW E&M-EST. PATIENT-LVL I: CPT | Mod: PBBFAC,,, | Performed by: ADVANCED PRACTICE MIDWIFE

## 2024-06-25 PROCEDURE — 99499 UNLISTED E&M SERVICE: CPT | Mod: S$GLB,,, | Performed by: ADVANCED PRACTICE MIDWIFE

## 2024-06-25 NOTE — PROGRESS NOTES
Patient presents to [facility] for group Meet and Greet session  Reviewed midwifery care options within collaborative practice setting and tour given of birth center  Card given for patient to call and schedule Initial Prenatal Visit (prior to 34w), if she desires transfer of care.    Delmi Henderson CNM

## 2024-06-27 ENCOUNTER — ROUTINE PRENATAL (OUTPATIENT)
Dept: OBSTETRICS AND GYNECOLOGY | Facility: CLINIC | Age: 34
End: 2024-06-27
Attending: OBSTETRICS & GYNECOLOGY
Payer: COMMERCIAL

## 2024-06-27 VITALS — SYSTOLIC BLOOD PRESSURE: 90 MMHG | BODY MASS INDEX: 22.5 KG/M2 | WEIGHT: 123 LBS | DIASTOLIC BLOOD PRESSURE: 60 MMHG

## 2024-06-27 DIAGNOSIS — Z34.02 ENCOUNTER FOR SUPERVISION OF NORMAL FIRST PREGNANCY IN SECOND TRIMESTER: Primary | ICD-10-CM

## 2024-06-27 DIAGNOSIS — O09.512 PRIMIGRAVIDA OF ADVANCED MATERNAL AGE IN SECOND TRIMESTER: ICD-10-CM

## 2024-06-27 PROCEDURE — 99999 PR PBB SHADOW E&M-EST. PATIENT-LVL III: CPT | Mod: PBBFAC,,, | Performed by: OBSTETRICS & GYNECOLOGY

## 2024-06-27 PROCEDURE — 0502F SUBSEQUENT PRENATAL CARE: CPT | Mod: CPTII,S$GLB,, | Performed by: OBSTETRICS & GYNECOLOGY

## 2024-06-28 ENCOUNTER — TELEPHONE (OUTPATIENT)
Dept: OBSTETRICS AND GYNECOLOGY | Facility: CLINIC | Age: 34
End: 2024-06-28
Payer: COMMERCIAL

## 2024-06-28 NOTE — TELEPHONE ENCOUNTER
Pt called to schedule initial transfer appt. I schedule pt for Tuesday July 16, 2024 @ 2;20pm. Pt agreed with date and time.

## 2024-07-10 ENCOUNTER — E-VISIT (OUTPATIENT)
Dept: OBSTETRICS AND GYNECOLOGY | Facility: CLINIC | Age: 34
End: 2024-07-10
Attending: OBSTETRICS & GYNECOLOGY
Payer: COMMERCIAL

## 2024-07-10 ENCOUNTER — PATIENT MESSAGE (OUTPATIENT)
Dept: OBSTETRICS AND GYNECOLOGY | Facility: CLINIC | Age: 34
End: 2024-07-10

## 2024-07-10 ENCOUNTER — LAB VISIT (OUTPATIENT)
Dept: LAB | Facility: OTHER | Age: 34
End: 2024-07-10
Attending: OBSTETRICS & GYNECOLOGY
Payer: COMMERCIAL

## 2024-07-10 DIAGNOSIS — R30.0 DYSURIA: Primary | ICD-10-CM

## 2024-07-10 DIAGNOSIS — R30.0 DYSURIA: ICD-10-CM

## 2024-07-10 LAB
BILIRUB UR QL STRIP: NEGATIVE
CLARITY UR: CLEAR
COLOR UR: YELLOW
GLUCOSE UR QL STRIP: NEGATIVE
HGB UR QL STRIP: NEGATIVE
KETONES UR QL STRIP: NEGATIVE
LEUKOCYTE ESTERASE UR QL STRIP: NEGATIVE
NITRITE UR QL STRIP: NEGATIVE
PH UR STRIP: 7 [PH] (ref 5–8)
PROT UR QL STRIP: NEGATIVE
SP GR UR STRIP: <1.005 (ref 1–1.03)
URN SPEC COLLECT METH UR: ABNORMAL
UROBILINOGEN UR STRIP-ACNC: NEGATIVE EU/DL

## 2024-07-10 PROCEDURE — 87088 URINE BACTERIA CULTURE: CPT | Performed by: OBSTETRICS & GYNECOLOGY

## 2024-07-10 PROCEDURE — 87086 URINE CULTURE/COLONY COUNT: CPT | Performed by: OBSTETRICS & GYNECOLOGY

## 2024-07-10 PROCEDURE — 81003 URINALYSIS AUTO W/O SCOPE: CPT | Performed by: OBSTETRICS & GYNECOLOGY

## 2024-07-10 RX ORDER — NITROFURANTOIN 25; 75 MG/1; MG/1
100 CAPSULE ORAL 2 TIMES DAILY
Qty: 14 CAPSULE | Refills: 0 | Status: SHIPPED | OUTPATIENT
Start: 2024-07-10 | End: 2024-07-17

## 2024-07-10 NOTE — PROGRESS NOTES
Patient ID: Daphne Sullivan is a 33 y.o. female.    Chief Complaint: Urinary Tract Infection (Entered automatically based on patient selection in [employer].)    The patient initiated a request through [employer] on 7/10/2024 for evaluation and management with a chief complaint of Urinary Tract Infection (Entered automatically based on patient selection in [employer].)     I evaluated the questionnaire submission on 7-10-24.    Ohs Peq Evisit Uti Questionnaire    7/10/2024  2:14 AM CDT - Filed by Patient   Do you agree to participate in an E-Visit? Yes   If you have any of the following symptoms, please present to your local emergency room or call 911:  I acknowledge   Are you pregnant, could you be pregnant, or are you breast feeding? Pregnant   What is the main issue you would like addressed today? Feels like I have a UTI again, urgency to pee all the time   What symptoms do you currently have? Difficulty passing urine;  Change in urine appearance or smell   When did your symptoms first appear? 7/3/2024   List what you have done or taken to help your symptoms. Azo and remaining meds from past visit   Please indicate whether you have had the following symptoms during the past 24 hours     Urgent urination (a sudden and uncontrollable urge to urinate) Severe   Frequent urination of small amounts of urine (going to the toilet very often) Severe   Burning pain when urinating Mild   Incomplete bladder emptying (still feel like you need to urinate again after urination) Severe   Pain not associated with urination in the lower abdomen below the belly button) Mild   What does your urine look like? Cloudy   Blood seen in the urine None   Flank pain (pain in one or both sides of the lower back) None   Abnormal Vaginal Discharge (abnormal amount, color and/or odor) None   Discharge from the urethra (urinary opening) without urination None   High body temperature/fever? None-<99.5   Please rate how much discomfort you have  experience because of the symptoms in the past 24 hours: Severe   Please indicate how the symptoms have interfered with your every day activities/work in the past 24 hours: Severe   Please indicate how these symptoms have interfered with your social activities (visiting people, meeting with friends, etc.) in the past 24 hours? Severe   Are you a diabetic? No   Please indicate whether you have the following at the time of completion of this questionnaire: Pregnancy   Provide any additional information you feel is important.    Please attach any relevant images or files (if you have performed a home test for UTI, please submit a photo of results)    Are you able to take your vital signs? No         Encounter Diagnosis   Name Primary?    Dysuria Yes        Orders Placed This Encounter   Procedures    Urine culture     Standing Status:   Future     Standing Expiration Date:   10/8/2025    Urinalysis     Standing Status:   Future     Standing Expiration Date:   2025     Order Specific Question:   Collection Type     Answer:   Urine, Clean Catch      Medications Ordered This Encounter   Medications    nitrofurantoin, macrocrystal-monohydrate, (MACROBID) 100 MG capsule     Sig: Take 1 capsule (100 mg total) by mouth 2 (two) times daily. for 7 days     Dispense:  14 capsule     Refill:  0        No follow-ups on file.      E-Visit Time Trackin minutes

## 2024-07-11 ENCOUNTER — PROCEDURE VISIT (OUTPATIENT)
Dept: MATERNAL FETAL MEDICINE | Facility: CLINIC | Age: 34
End: 2024-07-11
Payer: COMMERCIAL

## 2024-07-11 DIAGNOSIS — Z36.89 ENCOUNTER FOR ULTRASOUND TO ASSESS FETAL GROWTH: Primary | ICD-10-CM

## 2024-07-11 DIAGNOSIS — Z36.2 ENCOUNTER FOR FOLLOW-UP ULTRASOUND OF FETAL ANATOMY: ICD-10-CM

## 2024-07-11 LAB — BACTERIA UR CULT: ABNORMAL

## 2024-07-11 PROCEDURE — 76816 OB US FOLLOW-UP PER FETUS: CPT | Mod: S$GLB,,, | Performed by: OBSTETRICS & GYNECOLOGY

## 2024-07-16 ENCOUNTER — INITIAL PRENATAL (OUTPATIENT)
Dept: OBSTETRICS AND GYNECOLOGY | Facility: CLINIC | Age: 34
End: 2024-07-16
Payer: COMMERCIAL

## 2024-07-16 VITALS
DIASTOLIC BLOOD PRESSURE: 56 MMHG | BODY MASS INDEX: 23.29 KG/M2 | HEART RATE: 75 BPM | WEIGHT: 127.31 LBS | SYSTOLIC BLOOD PRESSURE: 105 MMHG

## 2024-07-16 DIAGNOSIS — O09.512 PRIMIGRAVIDA OF ADVANCED MATERNAL AGE IN SECOND TRIMESTER: Primary | ICD-10-CM

## 2024-07-16 DIAGNOSIS — F40.10 SOCIAL ANXIETY DISORDER: ICD-10-CM

## 2024-07-16 DIAGNOSIS — O44.40 LOW-LYING PLACENTA: ICD-10-CM

## 2024-07-16 PROCEDURE — 0502F SUBSEQUENT PRENATAL CARE: CPT | Mod: CPTII,S$GLB,, | Performed by: ADVANCED PRACTICE MIDWIFE

## 2024-07-16 PROCEDURE — 99999 PR PBB SHADOW E&M-EST. PATIENT-LVL III: CPT | Mod: PBBFAC,,, | Performed by: ADVANCED PRACTICE MIDWIFE

## 2024-07-16 NOTE — PROGRESS NOTES
Initial Prenatal Visit    33 y.o. female  at 23w5d, by Estimated Date of Delivery: 24    Complaints today/none. Doing well today.   Denies UCs, LOF, or VB. Reports daily FM.    Reviewed TW lbs    PHYSICAL EXAM  BP (!) 105/56   Pulse 75   Wt 57.7 kg (127 lb 5.1 oz)   LMP 2024   BMI 23.29 kg/m²     GENERAL: No acute distress  HEENT: Normocephalic, atraumatic  NEURO: Alert and oriented x3  PSYCH: Normal mood and affect  PULMONARY: Non-labored respiration; no tachypnea  ABD: Soft, gravid, nontender  Fh 2 above u  + fhts 140s-150s    ASSESSMENT AND PLAN  Primigravida of advanced maternal age in second trimester-declines MFM consult.  Neg NIPT and AFP.  Normal anatomy.  Repeat growth at 32 weeks.  Daily ASA encouraged.    -     OB Glucose Screen; Future; Expected date: 2024  -     CBC Auto Differential; Future  -     Treponema Pallidium Antibodies IgG, IgM; Future; Expected date: 2024  -     HIV 1/2 Ag/Ab (4th Gen); Future; Expected date: 2024  -     HEPATITIS C ANTIBODY; Future; Expected date: 2024    Unsure if desires delivery on Lnd  with epidural vs ABC.    Social anxiety disorder- no meds.    Low-lying placenta- discussed in depth. Bleeding precautions. Eval location with growth at 32 weeks.    Reviewed upcoming 28wk labs, (O POS) and orders placed  Education regarding warning signs of PreEclampsia, reviewed normal FM,  labor precautions, and how/when to call.    Follow-up: 4 weeks, call or present sooner PRN    Angelica Mistry CNM, APRN

## 2024-07-18 ENCOUNTER — PATIENT MESSAGE (OUTPATIENT)
Dept: OTHER | Facility: OTHER | Age: 34
End: 2024-07-18
Payer: COMMERCIAL

## 2024-07-25 ENCOUNTER — PATIENT MESSAGE (OUTPATIENT)
Dept: OBSTETRICS AND GYNECOLOGY | Facility: CLINIC | Age: 34
End: 2024-07-25
Payer: COMMERCIAL

## 2024-08-01 ENCOUNTER — PATIENT MESSAGE (OUTPATIENT)
Dept: OTHER | Facility: OTHER | Age: 34
End: 2024-08-01
Payer: COMMERCIAL

## 2024-08-02 ENCOUNTER — LAB VISIT (OUTPATIENT)
Dept: LAB | Facility: OTHER | Age: 34
End: 2024-08-02
Attending: OBSTETRICS & GYNECOLOGY
Payer: COMMERCIAL

## 2024-08-02 DIAGNOSIS — Z34.02 ENCOUNTER FOR SUPERVISION OF NORMAL FIRST PREGNANCY IN SECOND TRIMESTER: ICD-10-CM

## 2024-08-02 LAB — GLUCOSE SERPL-MCNC: 98 MG/DL (ref 70–140)

## 2024-08-02 PROCEDURE — 36415 COLL VENOUS BLD VENIPUNCTURE: CPT | Performed by: OBSTETRICS & GYNECOLOGY

## 2024-08-02 PROCEDURE — 82950 GLUCOSE TEST: CPT | Performed by: OBSTETRICS & GYNECOLOGY

## 2024-08-15 ENCOUNTER — PATIENT MESSAGE (OUTPATIENT)
Dept: OTHER | Facility: OTHER | Age: 34
End: 2024-08-15
Payer: COMMERCIAL

## 2024-08-16 ENCOUNTER — TELEPHONE (OUTPATIENT)
Dept: OBSTETRICS AND GYNECOLOGY | Facility: HOSPITAL | Age: 34
End: 2024-08-16
Payer: COMMERCIAL

## 2024-08-16 NOTE — TELEPHONE ENCOUNTER
Returned patient call. Patient c/o rectal bleeding and concerned that this means her hemorrhoid is infected. Desired antibiotics. Discussed normal symptoms of hemorrhoids and warning signs for infection. Has Preparation H. Discussed no need for abx at this time (denies fever, chills, redness/warmth). Encouraged to go to urgent care or ER if she is concerned for infection. Declined refill for preparation H. Encouraged fiber-rich foods and added hydration. V/U.    ----- Message from Richard Thompson MA sent at 8/16/2024  9:15 AM CDT -----  Regarding: HEMORRHOIDS  Contact: [phone]  Pt called and stated that she has a hemorrhoids and its not going away. She says it bleeding and would like some medication for it. Please advise

## 2024-08-26 ENCOUNTER — LAB VISIT (OUTPATIENT)
Dept: LAB | Facility: OTHER | Age: 34
End: 2024-08-26
Attending: ADVANCED PRACTICE MIDWIFE
Payer: COMMERCIAL

## 2024-08-26 ENCOUNTER — ROUTINE PRENATAL (OUTPATIENT)
Dept: OBSTETRICS AND GYNECOLOGY | Facility: CLINIC | Age: 34
End: 2024-08-26
Payer: COMMERCIAL

## 2024-08-26 ENCOUNTER — E-VISIT (OUTPATIENT)
Dept: OBSTETRICS AND GYNECOLOGY | Facility: CLINIC | Age: 34
End: 2024-08-26
Payer: COMMERCIAL

## 2024-08-26 VITALS
HEART RATE: 80 BPM | DIASTOLIC BLOOD PRESSURE: 60 MMHG | WEIGHT: 134.13 LBS | BODY MASS INDEX: 24.54 KG/M2 | SYSTOLIC BLOOD PRESSURE: 110 MMHG

## 2024-08-26 DIAGNOSIS — O44.40 LOW-LYING PLACENTA: ICD-10-CM

## 2024-08-26 DIAGNOSIS — K64.9 HEMORRHOIDS, UNSPECIFIED HEMORRHOID TYPE: ICD-10-CM

## 2024-08-26 DIAGNOSIS — O22.43 HEMORRHOIDS DURING PREGNANCY IN THIRD TRIMESTER: Primary | ICD-10-CM

## 2024-08-26 DIAGNOSIS — O09.512 PRIMIGRAVIDA OF ADVANCED MATERNAL AGE IN SECOND TRIMESTER: ICD-10-CM

## 2024-08-26 DIAGNOSIS — Z23 NEED FOR DIPHTHERIA-TETANUS-PERTUSSIS (TDAP) VACCINE: ICD-10-CM

## 2024-08-26 DIAGNOSIS — O09.513 PRIMIGRAVIDA OF ADVANCED MATERNAL AGE IN THIRD TRIMESTER: Primary | ICD-10-CM

## 2024-08-26 DIAGNOSIS — Z3A.29 29 WEEKS GESTATION OF PREGNANCY: ICD-10-CM

## 2024-08-26 PROBLEM — Z34.02 ENCOUNTER FOR SUPERVISION OF NORMAL FIRST PREGNANCY IN SECOND TRIMESTER: Status: RESOLVED | Noted: 2024-05-09 | Resolved: 2024-08-26

## 2024-08-26 LAB
BASOPHILS # BLD AUTO: 0.02 K/UL (ref 0–0.2)
BASOPHILS NFR BLD: 0.2 % (ref 0–1.9)
DIFFERENTIAL METHOD BLD: ABNORMAL
EOSINOPHIL # BLD AUTO: 0.1 K/UL (ref 0–0.5)
EOSINOPHIL NFR BLD: 0.5 % (ref 0–8)
ERYTHROCYTE [DISTWIDTH] IN BLOOD BY AUTOMATED COUNT: 12.3 % (ref 11.5–14.5)
HCT VFR BLD AUTO: 37.1 % (ref 37–48.5)
HCV AB SERPL QL IA: NEGATIVE
HGB BLD-MCNC: 12.2 G/DL (ref 12–16)
HIV 1+2 AB+HIV1 P24 AG SERPL QL IA: NEGATIVE
IMM GRANULOCYTES # BLD AUTO: 0.08 K/UL (ref 0–0.04)
IMM GRANULOCYTES NFR BLD AUTO: 0.8 % (ref 0–0.5)
LYMPHOCYTES # BLD AUTO: 1.9 K/UL (ref 1–4.8)
LYMPHOCYTES NFR BLD: 17.8 % (ref 18–48)
MCH RBC QN AUTO: 32.5 PG (ref 27–31)
MCHC RBC AUTO-ENTMCNC: 32.9 G/DL (ref 32–36)
MCV RBC AUTO: 99 FL (ref 82–98)
MONOCYTES # BLD AUTO: 1 K/UL (ref 0.3–1)
MONOCYTES NFR BLD: 9.9 % (ref 4–15)
NEUTROPHILS # BLD AUTO: 7.4 K/UL (ref 1.8–7.7)
NEUTROPHILS NFR BLD: 70.8 % (ref 38–73)
NRBC BLD-RTO: 0 /100 WBC
PLATELET # BLD AUTO: 277 K/UL (ref 150–450)
PMV BLD AUTO: 9 FL (ref 9.2–12.9)
RBC # BLD AUTO: 3.75 M/UL (ref 4–5.4)
TREPONEMA PALLIDUM IGG+IGM AB [PRESENCE] IN SERUM OR PLASMA BY IMMUNOASSAY: NONREACTIVE
WBC # BLD AUTO: 10.44 K/UL (ref 3.9–12.7)

## 2024-08-26 PROCEDURE — 36415 COLL VENOUS BLD VENIPUNCTURE: CPT | Performed by: ADVANCED PRACTICE MIDWIFE

## 2024-08-26 PROCEDURE — 0502F SUBSEQUENT PRENATAL CARE: CPT | Mod: S$GLB,,,

## 2024-08-26 PROCEDURE — 99999 PR PBB SHADOW E&M-EST. PATIENT-LVL III: CPT | Mod: PBBFAC,,,

## 2024-08-26 PROCEDURE — 85025 COMPLETE CBC W/AUTO DIFF WBC: CPT | Performed by: ADVANCED PRACTICE MIDWIFE

## 2024-08-26 PROCEDURE — 86593 SYPHILIS TEST NON-TREP QUANT: CPT | Performed by: ADVANCED PRACTICE MIDWIFE

## 2024-08-26 PROCEDURE — 86803 HEPATITIS C AB TEST: CPT | Performed by: ADVANCED PRACTICE MIDWIFE

## 2024-08-26 PROCEDURE — 87389 HIV-1 AG W/HIV-1&-2 AB AG IA: CPT | Performed by: ADVANCED PRACTICE MIDWIFE

## 2024-08-26 RX ORDER — HYDROCORTISONE 25 MG/G
CREAM TOPICAL 2 TIMES DAILY
Qty: 28 G | Refills: 0 | Status: SHIPPED | OUTPATIENT
Start: 2024-08-26

## 2024-08-26 RX ORDER — HYDROCORTISONE 25 MG/G
CREAM TOPICAL 2 TIMES DAILY
Qty: 28 G | Refills: 0 | Status: SHIPPED | OUTPATIENT
Start: 2024-08-26 | End: 2024-08-26

## 2024-08-26 NOTE — PROGRESS NOTES
34 y.o. female  at 29w4d by Estimated Date of Delivery: 24    Complaints today: none  Denies UCs, LOF, VB. Reports active fetal movement. Round ligament pain. Some hemorrhoids.    Reviewed TWlbs    PHYSICAL EXAM  /60   Pulse 80   Wt 60.9 kg (134 lb 2.4 oz)   LMP 2024   BMI 24.54 kg/m²     GENERAL: No acute distress  HEENT: Normocephalic, atraumatic  NEURO: Alert and oriented x3  PULMONARY: Non-labored respiration; no tachypnea  ABD: Soft, gravid, nontender.    ASSESSMENT AND PLAN  Primigravida of advanced maternal age in third trimester    Low-lying placenta    29 weeks gestation of pregnancy  -     CBC W/ AUTO DIFFERENTIAL; Future; Expected date: 2024  -     HIV 1/2 Ag/Ab (4th Gen); Future; Expected date: 2024  -     Treponema Pallidium Antibodies IgG, IgM; Future; Expected date: 2024  -     Tdap (BOOSTRIX) vaccine injection 0.5 mL    Need for diphtheria-tetanus-pertussis (Tdap) vaccine  -     Tdap (BOOSTRIX) vaccine injection 0.5 mL      Discussed belly band for RL pain.  Anusol for hemorrhoids. Also recommended colace/miralax for constipation.  Completing 28wk labs today. Glucose screen passed previously  Blood type: (O POS). Rhogam not indicated.  Education regarding CDC recommendations for Tdap in pregnancy. Patient desires. Order placed, will schedule with next appt.    Reviewed warning signs, normal FKCs,  labor precautions and how/when to call.    Follow-up: 2-3 weeks, call or present sooner PRN.    Jennifer Dan CNM, APRN

## 2024-08-29 ENCOUNTER — PATIENT MESSAGE (OUTPATIENT)
Dept: OTHER | Facility: OTHER | Age: 34
End: 2024-08-29
Payer: COMMERCIAL

## 2024-08-29 RX ORDER — LIDOCAINE HCL AND HYDROCORTISONE ACETATE 30; 10 MG/G; MG/G
1 CREAM RECTAL 2 TIMES DAILY PRN
Qty: 1 EACH | Refills: 2 | Status: SHIPPED | OUTPATIENT
Start: 2024-08-29

## 2024-09-09 ENCOUNTER — PATIENT MESSAGE (OUTPATIENT)
Dept: RESEARCH | Facility: OTHER | Age: 34
End: 2024-09-09
Payer: COMMERCIAL

## 2024-09-10 ENCOUNTER — PATIENT MESSAGE (OUTPATIENT)
Dept: OBSTETRICS AND GYNECOLOGY | Facility: CLINIC | Age: 34
End: 2024-09-10

## 2024-09-10 ENCOUNTER — ROUTINE PRENATAL (OUTPATIENT)
Dept: OBSTETRICS AND GYNECOLOGY | Facility: CLINIC | Age: 34
End: 2024-09-10
Payer: COMMERCIAL

## 2024-09-10 VITALS
HEART RATE: 62 BPM | WEIGHT: 132.25 LBS | SYSTOLIC BLOOD PRESSURE: 103 MMHG | DIASTOLIC BLOOD PRESSURE: 65 MMHG | BODY MASS INDEX: 24.19 KG/M2

## 2024-09-10 DIAGNOSIS — O99.613 CONSTIPATION DURING PREGNANCY IN THIRD TRIMESTER: ICD-10-CM

## 2024-09-10 DIAGNOSIS — K59.00 CONSTIPATION DURING PREGNANCY IN THIRD TRIMESTER: ICD-10-CM

## 2024-09-10 DIAGNOSIS — Z3A.31 31 WEEKS GESTATION OF PREGNANCY: Primary | ICD-10-CM

## 2024-09-10 DIAGNOSIS — O44.40 LOW-LYING PLACENTA: ICD-10-CM

## 2024-09-10 PROCEDURE — 0502F SUBSEQUENT PRENATAL CARE: CPT | Mod: S$GLB,,, | Performed by: ADVANCED PRACTICE MIDWIFE

## 2024-09-10 PROCEDURE — 99999 PR PBB SHADOW E&M-EST. PATIENT-LVL III: CPT | Mod: PBBFAC,,, | Performed by: ADVANCED PRACTICE MIDWIFE

## 2024-09-10 NOTE — PATIENT INSTRUCTIONS
Increase fiber  COLACE (stool softener) twice daily  No iron in prenatal vitamin    AVOID SENNA-CONTAINING MEDS    Hemorrhoids:   - Tucks  - Hydrocortisone/lidocaine.   - - -

## 2024-09-10 NOTE — PROGRESS NOTES
34 y.o. female  at 31w5d, by Estimated Date of Delivery: 24    Reports + FM, denies VB, LOF or regular CTX    Doing well. Reports extreme constipation, has tried Metamucil, Dulcolax, increase in fiber, pelvic floor PT, chiropractor, and saline enemas. Also reports hemorrhoids, although they are not as bothersome as the constipation.     TW lbs     PHYSICAL EXAM  /65   Pulse 62   Wt 60 kg (132 lb 4.4 oz)   LMP 2024   BMI 24.19 kg/m²   GENERAL: No acute distress  HEENT: Normocephalic, atraumatic  NEURO: Alert and oriented x3  PULMONARY: Non-labored respiration; no tachypnea  ABD: Soft, gravid, nontender.      ASSESSMENT AND PLAN  31 weeks gestation of pregnancy  -     BREAST PUMP FOR HOME USE    Constipation during pregnancy in third trimester  Discussed increase in fluids, increase Metamucil. Do not take saline enemas weekly. Start Colace stool softener BID. Continue with pelvic floor physical therapy.     Low-lying placenta  Repeat ultrasound @ 32 weeks (scheduled 24)    Delivery and blood consents reviewed  signed  Patient plan to breast feed - reviewed breast feeding class here. Patient does request Breast Pump Rx today.  It's a boy! They do desire circumcision.  Pediatrician: Uncertain  Traveling to [place] next week. Travel precautions given.     Reviewed warning signs, normal FM,  labor precautions, and how/when to call.  Confirmed pt has after-hours number.  Follow-up: 2 weeks, call or present sooner KALIEN    Geovanna Srinivasan CNM/KUMAR Henderson CNM

## 2024-09-12 ENCOUNTER — PATIENT MESSAGE (OUTPATIENT)
Dept: OTHER | Facility: OTHER | Age: 34
End: 2024-09-12
Payer: COMMERCIAL

## 2024-09-12 ENCOUNTER — PROCEDURE VISIT (OUTPATIENT)
Dept: MATERNAL FETAL MEDICINE | Facility: CLINIC | Age: 34
End: 2024-09-12
Payer: COMMERCIAL

## 2024-09-12 DIAGNOSIS — Z36.89 ENCOUNTER FOR ULTRASOUND TO ASSESS FETAL GROWTH: ICD-10-CM

## 2024-09-12 PROCEDURE — 76816 OB US FOLLOW-UP PER FETUS: CPT | Mod: S$GLB,,, | Performed by: OBSTETRICS & GYNECOLOGY

## 2024-09-23 NOTE — PROGRESS NOTES
"34 y.o.,  at 33w4d, presents for routine OB appt.  Denies LOF, VB, or UCs. Reports good FM.  Doing well today.   TWG: 15 lbs     PHYSICAL EXAM  GENERAL: No acute distress  HEENT: Normocephalic, atraumatic  NEURO: Alert and oriented x3  PULMONARY: Non-labored respiration; no tachypnea  ABD: Soft, gravid, nontender.    ASSESSMENT AND PLAN  Low-lying placenta RESOLVED  24 Growth US: EFW 23%, 1793 grams, AC 47%, VTX, Normal FRANK  "Placental site is anterior and high"    33 weeks gestation of pregnancy      Drastic improvement with constipation. Has stopped prenatal vitamins, incorporated dates, started Colace daily, taking Magnesium. Now having bowel movement twice daily.   Undecided on LD vs ABC. Considering epidural and delivery in LD.   Reviewed upcoming 36wk labs. GBS next visit.   Getting TDAP shot tomorrow.   Reviewed patient does NOT have a history of genital HSV.   Will bring birth plan next visit for review.     Reviewed warning signs, normal FM,  labor precautions, and how/when to call. Confirmed pt has after-hours number.    Follow-up: 2 weeks, call or present sooner PRN    Geovanna Srinivasan CNM      "

## 2024-09-24 ENCOUNTER — ROUTINE PRENATAL (OUTPATIENT)
Dept: OBSTETRICS AND GYNECOLOGY | Facility: CLINIC | Age: 34
End: 2024-09-24
Payer: COMMERCIAL

## 2024-09-24 VITALS
SYSTOLIC BLOOD PRESSURE: 121 MMHG | BODY MASS INDEX: 24.46 KG/M2 | HEART RATE: 80 BPM | WEIGHT: 133.69 LBS | DIASTOLIC BLOOD PRESSURE: 62 MMHG

## 2024-09-24 DIAGNOSIS — Z3A.33 33 WEEKS GESTATION OF PREGNANCY: Primary | ICD-10-CM

## 2024-09-24 DIAGNOSIS — O44.40 LOW-LYING PLACENTA: ICD-10-CM

## 2024-09-24 PROCEDURE — 0502F SUBSEQUENT PRENATAL CARE: CPT | Mod: S$GLB,,,

## 2024-09-24 PROCEDURE — 99999 PR PBB SHADOW E&M-EST. PATIENT-LVL III: CPT | Mod: PBBFAC,,,

## 2024-09-25 ENCOUNTER — CLINICAL SUPPORT (OUTPATIENT)
Dept: OBSTETRICS AND GYNECOLOGY | Facility: CLINIC | Age: 34
End: 2024-09-25
Payer: COMMERCIAL

## 2024-09-25 DIAGNOSIS — Z23 NEED FOR TDAP VACCINATION: Primary | ICD-10-CM

## 2024-09-25 PROCEDURE — 99999 PR PBB SHADOW E&M-EST. PATIENT-LVL I: CPT | Mod: PBBFAC,,,

## 2024-09-25 PROCEDURE — 90471 IMMUNIZATION ADMIN: CPT | Mod: S$GLB,,,

## 2024-09-25 PROCEDURE — 90715 TDAP VACCINE 7 YRS/> IM: CPT | Mod: S$GLB,,,

## 2024-10-03 ENCOUNTER — PATIENT MESSAGE (OUTPATIENT)
Dept: OTHER | Facility: OTHER | Age: 34
End: 2024-10-03
Payer: COMMERCIAL

## 2024-10-15 ENCOUNTER — ROUTINE PRENATAL (OUTPATIENT)
Dept: OBSTETRICS AND GYNECOLOGY | Facility: CLINIC | Age: 34
End: 2024-10-15
Payer: COMMERCIAL

## 2024-10-15 VITALS
BODY MASS INDEX: 24.58 KG/M2 | WEIGHT: 134.38 LBS | DIASTOLIC BLOOD PRESSURE: 75 MMHG | SYSTOLIC BLOOD PRESSURE: 110 MMHG

## 2024-10-15 DIAGNOSIS — Z34.03 ENCOUNTER FOR SUPERVISION OF NORMAL FIRST PREGNANCY IN THIRD TRIMESTER: Primary | ICD-10-CM

## 2024-10-15 PROCEDURE — 99999 PR PBB SHADOW E&M-EST. PATIENT-LVL II: CPT | Mod: PBBFAC,,, | Performed by: ADVANCED PRACTICE MIDWIFE

## 2024-10-15 PROCEDURE — 87653 STREP B DNA AMP PROBE: CPT | Performed by: ADVANCED PRACTICE MIDWIFE

## 2024-10-15 NOTE — PROGRESS NOTES
34 y.o.,  at 36w5d here today for routine ob appt.    Complaints today: no.  Here with Refugio. Doing well without concerns. Denies LOF, VB, and cramping or regular contractions. Denies dysuria and any other s/s UTI. Denies HA, visual disturbances, and upper abdominal pain. Reports good fetal movement.    Verified No history of genital HSV.  TW lbs     PHYSICAL EXAM  GENERAL: No acute distress  HEENT: Normocephalic, atraumatic  NEURO: Alert and oriented x3  PULMONARY: Non-labored respiration; no tachypnea  ABD: Soft, gravid, nontender. Vertex per Leopold's.    ASSESSMENT AND PLAN  Encounter for supervision of normal first pregnancy in third trimester        GBS collected today. Reviewed Allergies: Patient has no known allergies.  Reviewed [place] department of Health recommendation for repeat HIV/RPR today; patient already done   Reviewed warning signs, normal FM,  labor precautions, and how/when to call.      Follow-up: 1 week, call or present sooner CLAIRE Shine CNM, MS

## 2024-10-17 LAB — GROUP B STREPTOCOCCUS, PCR: NEGATIVE

## 2024-10-22 ENCOUNTER — HOSPITAL ENCOUNTER (OUTPATIENT)
Dept: PERINATAL CARE | Facility: OTHER | Age: 34
Discharge: HOME | End: 2024-10-22
Attending: ADVANCED PRACTICE MIDWIFE
Payer: COMMERCIAL

## 2024-10-22 ENCOUNTER — ROUTINE PRENATAL (OUTPATIENT)
Dept: OBSTETRICS AND GYNECOLOGY | Facility: CLINIC | Age: 34
End: 2024-10-22
Payer: COMMERCIAL

## 2024-10-22 VITALS
SYSTOLIC BLOOD PRESSURE: 122 MMHG | BODY MASS INDEX: 25.38 KG/M2 | HEART RATE: 58 BPM | WEIGHT: 138.75 LBS | DIASTOLIC BLOOD PRESSURE: 79 MMHG

## 2024-10-22 DIAGNOSIS — O36.8390 ABNORMAL FETAL HEART RATE AFFECTING PREGNANCY: ICD-10-CM

## 2024-10-22 DIAGNOSIS — O36.8130 DECREASED FETAL MOVEMENTS IN THIRD TRIMESTER, SINGLE OR UNSPECIFIED FETUS: ICD-10-CM

## 2024-10-22 DIAGNOSIS — Z3A.37 37 WEEKS GESTATION OF PREGNANCY: Primary | ICD-10-CM

## 2024-10-22 PROCEDURE — 59025 FETAL NON-STRESS TEST: CPT

## 2024-10-22 PROCEDURE — 99999 PR PBB SHADOW E&M-EST. PATIENT-LVL II: CPT | Mod: PBBFAC,,, | Performed by: ADVANCED PRACTICE MIDWIFE

## 2024-10-22 PROCEDURE — 59025 FETAL NON-STRESS TEST: CPT | Mod: 26,,, | Performed by: STUDENT IN AN ORGANIZED HEALTH CARE EDUCATION/TRAINING PROGRAM

## 2024-10-22 PROCEDURE — 76815 OB US LIMITED FETUS(S): CPT

## 2024-10-22 PROCEDURE — 76815 OB US LIMITED FETUS(S): CPT | Mod: 26,,, | Performed by: STUDENT IN AN ORGANIZED HEALTH CARE EDUCATION/TRAINING PROGRAM

## 2024-10-22 NOTE — PROGRESS NOTES
34 y.o.,  at 37w5d presents today for routine OB appt.  Denies regular UCs, LOF or VB. Reports good FM. Doing well with reoccurrence of some constipation this week - eating increased fiber and has OTC meds as previously discussed.  Also reports possibly some decreased FM this week - reports she thinks normal today? But seemed decreased .     TW lbs     PHYSICAL EXAM  GENERAL: No acute distress  HEENT: Normocephalic, atraumatic  NEURO: Alert and oriented x3  PULMONARY: Non-labored respiration; no tachypnea  ABD: Soft, gravid, nontender.    ASSESSMENT AND PLAN  37 weeks gestation of pregnancy    Abnormal fetal heart rate affecting pregnancy  -     Prenatal Testing - NST/FRANK; Standing    Decreased fetal movements in third trimester, single or unspecified fetus  -     Prenatal Testing - NST/FRANK; Standing    FHT's ranging from 145's to 112 with doppler - unable to determine baseline vs accels/decels. Pt to PNT immediately following visit for NST/FRANK.  Reviewed GBS negative.    Reports she has a : Donna Miles ([employer] [facility])  Education regarding labor precautions.  Reviewed warning signs, normal FM, and how/when to call.      Follow-up: 1 week, call or present sooner CLAIRE Henderson CNM/KUMAR

## 2024-10-29 ENCOUNTER — ROUTINE PRENATAL (OUTPATIENT)
Dept: OBSTETRICS AND GYNECOLOGY | Facility: CLINIC | Age: 34
End: 2024-10-29
Payer: COMMERCIAL

## 2024-10-29 VITALS — DIASTOLIC BLOOD PRESSURE: 76 MMHG | HEART RATE: 51 BPM | SYSTOLIC BLOOD PRESSURE: 128 MMHG

## 2024-10-29 DIAGNOSIS — Z3A.38 38 WEEKS GESTATION OF PREGNANCY: Primary | ICD-10-CM

## 2024-10-29 PROBLEM — O09.513 PRIMIGRAVIDA OF ADVANCED MATERNAL AGE IN THIRD TRIMESTER: Status: RESOLVED | Noted: 2024-05-09 | Resolved: 2024-10-29

## 2024-10-29 PROCEDURE — 99999 PR PBB SHADOW E&M-EST. PATIENT-LVL II: CPT | Mod: PBBFAC,,, | Performed by: ADVANCED PRACTICE MIDWIFE

## 2024-10-29 PROCEDURE — 0502F SUBSEQUENT PRENATAL CARE: CPT | Mod: S$GLB,,, | Performed by: ADVANCED PRACTICE MIDWIFE

## 2024-11-02 ENCOUNTER — HOSPITAL ENCOUNTER (INPATIENT)
Facility: OTHER | Age: 34
LOS: 3 days | Discharge: HOME | End: 2024-11-05
Attending: STUDENT IN AN ORGANIZED HEALTH CARE EDUCATION/TRAINING PROGRAM | Admitting: STUDENT IN AN ORGANIZED HEALTH CARE EDUCATION/TRAINING PROGRAM
Payer: COMMERCIAL

## 2024-11-02 ENCOUNTER — TELEPHONE (OUTPATIENT)
Dept: OBSTETRICS AND GYNECOLOGY | Facility: HOSPITAL | Age: 34
End: 2024-11-02
Payer: COMMERCIAL

## 2024-11-02 ENCOUNTER — TELEPHONE (OUTPATIENT)
Dept: OBSTETRICS AND GYNECOLOGY | Facility: OTHER | Age: 34
End: 2024-11-02
Payer: COMMERCIAL

## 2024-11-02 DIAGNOSIS — Z37.9 NORMAL LABOR: Primary | ICD-10-CM

## 2024-11-02 DIAGNOSIS — O47.9 THREATENED LABOR AT TERM: ICD-10-CM

## 2024-11-02 DIAGNOSIS — Z37.9 NORMAL LABOR: ICD-10-CM

## 2024-11-02 PROCEDURE — 86593 SYPHILIS TEST NON-TREP QUANT: CPT | Performed by: ADVANCED PRACTICE MIDWIFE

## 2024-11-02 PROCEDURE — 86900 BLOOD TYPING SEROLOGIC ABO: CPT | Performed by: ADVANCED PRACTICE MIDWIFE

## 2024-11-02 PROCEDURE — 85025 COMPLETE CBC W/AUTO DIFF WBC: CPT | Performed by: ADVANCED PRACTICE MIDWIFE

## 2024-11-02 PROCEDURE — 11000001 HC ACUTE MED/SURG PRIVATE ROOM

## 2024-11-02 PROCEDURE — 4A0HXCZ MEASUREMENT OF PRODUCTS OF CONCEPTION, CARDIAC RATE, EXTERNAL APPROACH: ICD-10-PCS | Performed by: STUDENT IN AN ORGANIZED HEALTH CARE EDUCATION/TRAINING PROGRAM

## 2024-11-02 RX ORDER — DIPHENOXYLATE HYDROCHLORIDE AND ATROPINE SULFATE 2.5; .025 MG/1; MG/1
2 TABLET ORAL EVERY 6 HOURS PRN
Status: DISCONTINUED | OUTPATIENT
Start: 2024-11-02 | End: 2024-11-03

## 2024-11-02 RX ORDER — MISOPROSTOL 200 UG/1
800 TABLET ORAL ONCE AS NEEDED
Status: DISCONTINUED | OUTPATIENT
Start: 2024-11-02 | End: 2024-11-03

## 2024-11-02 RX ORDER — DIPHENOXYLATE HYDROCHLORIDE AND ATROPINE SULFATE 2.5; .025 MG/1; MG/1
2 TABLET ORAL EVERY 6 HOURS PRN
Status: CANCELLED | OUTPATIENT
Start: 2024-11-02

## 2024-11-02 RX ORDER — METHYLERGONOVINE MALEATE 0.2 MG/ML
200 INJECTION INTRAVENOUS ONCE AS NEEDED
Status: DISCONTINUED | OUTPATIENT
Start: 2024-11-02 | End: 2024-11-03

## 2024-11-02 RX ORDER — CARBOPROST TROMETHAMINE 250 UG/ML
250 INJECTION, SOLUTION INTRAMUSCULAR
Status: DISCONTINUED | OUTPATIENT
Start: 2024-11-02 | End: 2024-11-03

## 2024-11-02 RX ORDER — SODIUM CHLORIDE, SODIUM LACTATE, POTASSIUM CHLORIDE, CALCIUM CHLORIDE 600; 310; 30; 20 MG/100ML; MG/100ML; MG/100ML; MG/100ML
INJECTION, SOLUTION INTRAVENOUS CONTINUOUS
Status: CANCELLED | OUTPATIENT
Start: 2024-11-02

## 2024-11-02 RX ORDER — OXYTOCIN-SODIUM CHLORIDE 0.9% IV SOLN 30 UNIT/500ML 30-0.9/5 UT/ML-%
95 SOLUTION INTRAVENOUS ONCE AS NEEDED
Status: DISCONTINUED | OUTPATIENT
Start: 2024-11-02 | End: 2024-11-03

## 2024-11-02 RX ORDER — LIDOCAINE HYDROCHLORIDE 10 MG/ML
10 INJECTION, SOLUTION INFILTRATION; PERINEURAL ONCE AS NEEDED
Status: CANCELLED | OUTPATIENT
Start: 2024-11-02 | End: 2036-03-31

## 2024-11-02 RX ORDER — CALCIUM CARBONATE 200(500)MG
500 TABLET,CHEWABLE ORAL 3 TIMES DAILY PRN
Status: DISCONTINUED | OUTPATIENT
Start: 2024-11-02 | End: 2024-11-03

## 2024-11-02 RX ORDER — CARBOPROST TROMETHAMINE 250 UG/ML
250 INJECTION, SOLUTION INTRAMUSCULAR
Status: CANCELLED | OUTPATIENT
Start: 2024-11-02

## 2024-11-02 RX ORDER — ONDANSETRON 8 MG/1
8 TABLET, ORALLY DISINTEGRATING ORAL EVERY 8 HOURS PRN
Status: DISCONTINUED | OUTPATIENT
Start: 2024-11-02 | End: 2024-11-03

## 2024-11-02 RX ORDER — MUPIROCIN 20 MG/G
OINTMENT TOPICAL
Status: DISCONTINUED | OUTPATIENT
Start: 2024-11-02 | End: 2024-11-03

## 2024-11-02 RX ORDER — OXYTOCIN 10 [USP'U]/ML
10 INJECTION, SOLUTION INTRAMUSCULAR; INTRAVENOUS ONCE AS NEEDED
Status: CANCELLED | OUTPATIENT
Start: 2024-11-02 | End: 2036-03-31

## 2024-11-02 RX ORDER — OXYTOCIN-SODIUM CHLORIDE 0.9% IV SOLN 30 UNIT/500ML 30-0.9/5 UT/ML-%
10 SOLUTION INTRAVENOUS ONCE AS NEEDED
Status: DISCONTINUED | OUTPATIENT
Start: 2024-11-02 | End: 2024-11-03

## 2024-11-02 RX ORDER — SODIUM CHLORIDE 9 MG/ML
INJECTION, SOLUTION INTRAVENOUS
Status: CANCELLED | OUTPATIENT
Start: 2024-11-02

## 2024-11-02 RX ORDER — LIDOCAINE HYDROCHLORIDE 10 MG/ML
10 INJECTION, SOLUTION INFILTRATION; PERINEURAL ONCE AS NEEDED
Status: DISCONTINUED | OUTPATIENT
Start: 2024-11-02 | End: 2024-11-03

## 2024-11-02 RX ORDER — SIMETHICONE 80 MG
1 TABLET,CHEWABLE ORAL 4 TIMES DAILY PRN
Status: DISCONTINUED | OUTPATIENT
Start: 2024-11-02 | End: 2024-11-03

## 2024-11-02 RX ORDER — ONDANSETRON 4 MG/1
8 TABLET, ORALLY DISINTEGRATING ORAL EVERY 8 HOURS PRN
Status: CANCELLED | OUTPATIENT
Start: 2024-11-02

## 2024-11-02 RX ORDER — MISOPROSTOL 100 UG/1
800 TABLET ORAL ONCE AS NEEDED
Status: CANCELLED | OUTPATIENT
Start: 2024-11-02 | End: 2036-03-31

## 2024-11-02 RX ORDER — CALCIUM CARBONATE 200(500)MG
500 TABLET,CHEWABLE ORAL 3 TIMES DAILY PRN
Status: CANCELLED | OUTPATIENT
Start: 2024-11-02

## 2024-11-02 RX ORDER — SODIUM CHLORIDE 9 MG/ML
INJECTION, SOLUTION INTRAVENOUS
Status: DISCONTINUED | OUTPATIENT
Start: 2024-11-02 | End: 2024-11-03

## 2024-11-02 RX ORDER — OXYTOCIN 10 [USP'U]/ML
10 INJECTION, SOLUTION INTRAMUSCULAR; INTRAVENOUS ONCE AS NEEDED
Status: DISCONTINUED | OUTPATIENT
Start: 2024-11-02 | End: 2024-11-03

## 2024-11-02 RX ORDER — SIMETHICONE 80 MG
1 TABLET,CHEWABLE ORAL 4 TIMES DAILY PRN
Status: CANCELLED | OUTPATIENT
Start: 2024-11-02

## 2024-11-02 RX ORDER — OXYTOCIN-SODIUM CHLORIDE 0.9% IV SOLN 30 UNIT/500ML 30-0.9/5 UT/ML-%
10 SOLUTION INTRAVENOUS ONCE AS NEEDED
Status: CANCELLED | OUTPATIENT
Start: 2024-11-02 | End: 2036-03-31

## 2024-11-02 RX ORDER — OXYTOCIN-SODIUM CHLORIDE 0.9% IV SOLN 30 UNIT/500ML 30-0.9/5 UT/ML-%
95 SOLUTION INTRAVENOUS CONTINUOUS PRN
Status: DISCONTINUED | OUTPATIENT
Start: 2024-11-02 | End: 2024-11-03

## 2024-11-02 RX ORDER — MISOPROSTOL 100 UG/1
800 TABLET ORAL ONCE AS NEEDED
Status: CANCELLED | OUTPATIENT
Start: 2024-11-02

## 2024-11-02 RX ORDER — SODIUM CHLORIDE, SODIUM LACTATE, POTASSIUM CHLORIDE, CALCIUM CHLORIDE 600; 310; 30; 20 MG/100ML; MG/100ML; MG/100ML; MG/100ML
INJECTION, SOLUTION INTRAVENOUS CONTINUOUS
Status: DISCONTINUED | OUTPATIENT
Start: 2024-11-02 | End: 2024-11-03

## 2024-11-02 RX ORDER — MUPIROCIN 20 MG/G
OINTMENT TOPICAL
Status: CANCELLED | OUTPATIENT
Start: 2024-11-02

## 2024-11-02 RX ORDER — METHYLERGONOVINE MALEATE 0.2 MG/ML
200 INJECTION INTRAVENOUS ONCE AS NEEDED
Status: CANCELLED | OUTPATIENT
Start: 2024-11-02 | End: 2036-03-31

## 2024-11-02 RX ORDER — TRANEXAMIC ACID 10 MG/ML
1000 INJECTION, SOLUTION INTRAVENOUS EVERY 30 MIN PRN
Status: DISCONTINUED | OUTPATIENT
Start: 2024-11-02 | End: 2024-11-03

## 2024-11-03 ENCOUNTER — ANESTHESIA EVENT (OUTPATIENT)
Dept: OBSTETRICS AND GYNECOLOGY | Facility: OTHER | Age: 34
End: 2024-11-03
Payer: COMMERCIAL

## 2024-11-03 ENCOUNTER — ANESTHESIA (OUTPATIENT)
Dept: OBSTETRICS AND GYNECOLOGY | Facility: OTHER | Age: 34
End: 2024-11-03
Payer: COMMERCIAL

## 2024-11-03 PROBLEM — O13.3 GESTATIONAL HYPERTENSION, THIRD TRIMESTER: Status: ACTIVE | Noted: 2024-11-03

## 2024-11-03 LAB
ABO + RH BLD: NORMAL
ALBUMIN SERPL BCP-MCNC: 2.4 G/DL (ref 3.5–5.2)
ALLENS TEST: ABNORMAL
ALLENS TEST: ABNORMAL
ALP SERPL-CCNC: 246 U/L (ref 40–150)
ALT SERPL W/O P-5'-P-CCNC: 37 U/L (ref 10–44)
ANION GAP SERPL CALC-SCNC: 9 MMOL/L (ref 8–16)
AST SERPL-CCNC: 65 U/L (ref 10–40)
BASOPHILS # BLD AUTO: 0.02 K/UL (ref 0–0.2)
BASOPHILS NFR BLD: 0.1 % (ref 0–1.9)
BILIRUB SERPL-MCNC: 0.9 MG/DL (ref 0.1–1)
BLD GP AB SCN CELLS X3 SERPL QL: NORMAL
BUN SERPL-MCNC: 11 MG/DL (ref 6–20)
CALCIUM SERPL-MCNC: 8.6 MG/DL (ref 8.7–10.5)
CHLORIDE SERPL-SCNC: 102 MMOL/L (ref 95–110)
CO2 SERPL-SCNC: 16 MMOL/L (ref 23–29)
CREAT SERPL-MCNC: 1 MG/DL (ref 0.5–1.4)
DIFFERENTIAL METHOD BLD: ABNORMAL
EOSINOPHIL # BLD AUTO: 0 K/UL (ref 0–0.5)
EOSINOPHIL NFR BLD: 0 % (ref 0–8)
ERYTHROCYTE [DISTWIDTH] IN BLOOD BY AUTOMATED COUNT: 12.7 % (ref 11.5–14.5)
EST. GFR  (NO RACE VARIABLE): >60 ML/MIN/1.73 M^2
GLUCOSE SERPL-MCNC: 100 MG/DL (ref 70–110)
HCO3 UR-SCNC: 17.1 MMOL/L (ref 17–27)
HCO3 UR-SCNC: 18.3 MMOL/L (ref 12–29)
HCT VFR BLD AUTO: 37.7 % (ref 37–48.5)
HCT VFR BLD CALC: 49 %PCV (ref 36–54)
HCT VFR BLD CALC: 50 %PCV (ref 36–54)
HGB BLD-MCNC: 13.3 G/DL (ref 12–16)
HGB BLD-MCNC: 17 G/DL
HGB BLD-MCNC: 17 G/DL
IMM GRANULOCYTES # BLD AUTO: 0.05 K/UL (ref 0–0.04)
IMM GRANULOCYTES NFR BLD AUTO: 0.4 % (ref 0–0.5)
LYMPHOCYTES # BLD AUTO: 2.7 K/UL (ref 1–4.8)
LYMPHOCYTES NFR BLD: 19.6 % (ref 18–48)
MCH RBC QN AUTO: 31.9 PG (ref 27–31)
MCHC RBC AUTO-ENTMCNC: 35.3 G/DL (ref 32–36)
MCV RBC AUTO: 90 FL (ref 82–98)
MONOCYTES # BLD AUTO: 1 K/UL (ref 0.3–1)
MONOCYTES NFR BLD: 7.3 % (ref 4–15)
NEUTROPHILS # BLD AUTO: 9.8 K/UL (ref 1.8–7.7)
NEUTROPHILS NFR BLD: 72.6 % (ref 38–73)
NRBC BLD-RTO: 0 /100 WBC
PCO2 BLDA: 32.8 MMHG (ref 32–66)
PCO2 BLDA: 35.7 MMHG (ref 27–49)
PH SMN: 7.32 [PH] (ref 7.25–7.45)
PH SMN: 7.33 [PH] (ref 7.18–7.38)
PLATELET # BLD AUTO: 287 K/UL (ref 150–450)
PMV BLD AUTO: 10.5 FL (ref 9.2–12.9)
PO2 BLDA: 29 MMHG (ref 17–41)
PO2 BLDA: 30 MMHG (ref 6–31)
POC BE: -8 MMOL/L
POC BE: -9 MMOL/L
POC SATURATED O2: 49 %
POC SATURATED O2: 53 %
POC TCO2: 18 MMOL/L (ref 23–27)
POC TCO2: 19 MMOL/L (ref 23–27)
POTASSIUM SERPL-SCNC: 3.9 MMOL/L (ref 3.5–5.1)
PROT SERPL-MCNC: 5.5 G/DL (ref 6–8.4)
RBC # BLD AUTO: 4.17 M/UL (ref 4–5.4)
SAMPLE: ABNORMAL
SAMPLE: ABNORMAL
SITE: ABNORMAL
SITE: ABNORMAL
SODIUM SERPL-SCNC: 127 MMOL/L (ref 136–145)
TREPONEMA PALLIDUM IGG+IGM AB [PRESENCE] IN SERUM OR PLASMA BY IMMUNOASSAY: NONREACTIVE
WBC # BLD AUTO: 13.49 K/UL (ref 3.9–12.7)

## 2024-11-03 PROCEDURE — 62326 NJX INTERLAMINAR LMBR/SAC: CPT | Performed by: ANESTHESIOLOGY

## 2024-11-03 PROCEDURE — 99285 EMERGENCY DEPT VISIT HI MDM: CPT | Mod: 25,,, | Performed by: STUDENT IN AN ORGANIZED HEALTH CARE EDUCATION/TRAINING PROGRAM

## 2024-11-03 PROCEDURE — 25000003 PHARM REV CODE 250: Performed by: ANESTHESIOLOGY

## 2024-11-03 PROCEDURE — 25000003 PHARM REV CODE 250: Performed by: ADVANCED PRACTICE MIDWIFE

## 2024-11-03 PROCEDURE — 63600175 PHARM REV CODE 636 W HCPCS: Performed by: ANESTHESIOLOGY

## 2024-11-03 PROCEDURE — 63600175 PHARM REV CODE 636 W HCPCS: Performed by: ADVANCED PRACTICE MIDWIFE

## 2024-11-03 PROCEDURE — 72100003 HC LABOR CARE, EA. ADDL. 8 HRS

## 2024-11-03 PROCEDURE — 59400 OBSTETRICAL CARE: CPT | Mod: ,,, | Performed by: ADVANCED PRACTICE MIDWIFE

## 2024-11-03 PROCEDURE — 51702 INSERT TEMP BLADDER CATH: CPT

## 2024-11-03 PROCEDURE — 0KQM0ZZ REPAIR PERINEUM MUSCLE, OPEN APPROACH: ICD-10-PCS | Performed by: ADVANCED PRACTICE MIDWIFE

## 2024-11-03 PROCEDURE — C1751 CATH, INF, PER/CENT/MIDLINE: HCPCS | Performed by: ANESTHESIOLOGY

## 2024-11-03 PROCEDURE — 11000001 HC ACUTE MED/SURG PRIVATE ROOM

## 2024-11-03 PROCEDURE — 82803 BLOOD GASES ANY COMBINATION: CPT

## 2024-11-03 PROCEDURE — 59025 FETAL NON-STRESS TEST: CPT | Mod: 26,,, | Performed by: STUDENT IN AN ORGANIZED HEALTH CARE EDUCATION/TRAINING PROGRAM

## 2024-11-03 PROCEDURE — 72100002 HC LABOR CARE, 1ST 8 HOURS

## 2024-11-03 PROCEDURE — 27200710 HC EPIDURAL INFUSION PUMP SET: Performed by: ANESTHESIOLOGY

## 2024-11-03 PROCEDURE — 80053 COMPREHEN METABOLIC PANEL: CPT | Performed by: ADVANCED PRACTICE MIDWIFE

## 2024-11-03 PROCEDURE — 99900035 HC TECH TIME PER 15 MIN (STAT)

## 2024-11-03 PROCEDURE — 36416 COLLJ CAPILLARY BLOOD SPEC: CPT

## 2024-11-03 PROCEDURE — 72200005 HC VAGINAL DELIVERY LEVEL II

## 2024-11-03 RX ORDER — HYDROCORTISONE 25 MG/G
CREAM TOPICAL 3 TIMES DAILY PRN
Status: DISCONTINUED | OUTPATIENT
Start: 2024-11-03 | End: 2024-11-05 | Stop reason: HOSPADM

## 2024-11-03 RX ORDER — DOCUSATE SODIUM 100 MG/1
200 CAPSULE, LIQUID FILLED ORAL 2 TIMES DAILY PRN
Status: DISCONTINUED | OUTPATIENT
Start: 2024-11-03 | End: 2024-11-05 | Stop reason: HOSPADM

## 2024-11-03 RX ORDER — OXYTOCIN-SODIUM CHLORIDE 0.9% IV SOLN 30 UNIT/500ML 30-0.9/5 UT/ML-%
30 SOLUTION INTRAVENOUS ONCE AS NEEDED
Status: DISCONTINUED | OUTPATIENT
Start: 2024-11-03 | End: 2024-11-03

## 2024-11-03 RX ORDER — SIMETHICONE 80 MG
1 TABLET,CHEWABLE ORAL EVERY 6 HOURS PRN
Status: DISCONTINUED | OUTPATIENT
Start: 2024-11-03 | End: 2024-11-05 | Stop reason: HOSPADM

## 2024-11-03 RX ORDER — SODIUM CITRATE AND CITRIC ACID MONOHYDRATE 334; 500 MG/5ML; MG/5ML
30 SOLUTION ORAL ONCE
Status: DISCONTINUED | OUTPATIENT
Start: 2024-11-03 | End: 2024-11-03

## 2024-11-03 RX ORDER — LIDOCAINE HYDROCHLORIDE AND EPINEPHRINE 15; 5 MG/ML; UG/ML
INJECTION, SOLUTION EPIDURAL
Status: DISCONTINUED | OUTPATIENT
Start: 2024-11-03 | End: 2024-11-03

## 2024-11-03 RX ORDER — FENTANYL/BUPIVACAINE/NS/PF 2MCG/ML-.1
PLASTIC BAG, INJECTION (ML) INJECTION CONTINUOUS
Status: DISCONTINUED | OUTPATIENT
Start: 2024-11-03 | End: 2024-11-03

## 2024-11-03 RX ORDER — CARBOPROST TROMETHAMINE 250 UG/ML
250 INJECTION, SOLUTION INTRAMUSCULAR
Status: DISCONTINUED | OUTPATIENT
Start: 2024-11-03 | End: 2024-11-05 | Stop reason: HOSPADM

## 2024-11-03 RX ORDER — DIPHENHYDRAMINE HYDROCHLORIDE 50 MG/ML
12.5 INJECTION INTRAMUSCULAR; INTRAVENOUS ONCE
Status: COMPLETED | OUTPATIENT
Start: 2024-11-03 | End: 2024-11-03

## 2024-11-03 RX ORDER — TRANEXAMIC ACID 10 MG/ML
1000 INJECTION, SOLUTION INTRAVENOUS EVERY 30 MIN PRN
Status: DISCONTINUED | OUTPATIENT
Start: 2024-11-03 | End: 2024-11-05 | Stop reason: HOSPADM

## 2024-11-03 RX ORDER — DIPHENHYDRAMINE HCL 25 MG
25 CAPSULE ORAL EVERY 4 HOURS PRN
Status: DISCONTINUED | OUTPATIENT
Start: 2024-11-03 | End: 2024-11-05 | Stop reason: HOSPADM

## 2024-11-03 RX ORDER — FENTANYL/BUPIVACAINE/NS/PF 2MCG/ML-.1
PLASTIC BAG, INJECTION (ML) INJECTION
Status: DISCONTINUED | OUTPATIENT
Start: 2024-11-03 | End: 2024-11-03

## 2024-11-03 RX ORDER — OXYTOCIN 10 [USP'U]/ML
10 INJECTION, SOLUTION INTRAMUSCULAR; INTRAVENOUS ONCE AS NEEDED
Status: DISCONTINUED | OUTPATIENT
Start: 2024-11-03 | End: 2024-11-05 | Stop reason: HOSPADM

## 2024-11-03 RX ORDER — FAMOTIDINE 10 MG/ML
20 INJECTION INTRAVENOUS ONCE
Status: DISCONTINUED | OUTPATIENT
Start: 2024-11-03 | End: 2024-11-03

## 2024-11-03 RX ORDER — PRENATAL WITH FERROUS FUM AND FOLIC ACID 3080; 920; 120; 400; 22; 1.84; 3; 20; 10; 1; 12; 200; 27; 25; 2 [IU]/1; [IU]/1; MG/1; [IU]/1; MG/1; MG/1; MG/1; MG/1; MG/1; MG/1; UG/1; MG/1; MG/1; MG/1; MG/1
1 TABLET ORAL DAILY
Status: DISCONTINUED | OUTPATIENT
Start: 2024-11-04 | End: 2024-11-05 | Stop reason: HOSPADM

## 2024-11-03 RX ORDER — DIPHENHYDRAMINE HYDROCHLORIDE 50 MG/ML
25 INJECTION INTRAMUSCULAR; INTRAVENOUS EVERY 4 HOURS PRN
Status: DISCONTINUED | OUTPATIENT
Start: 2024-11-03 | End: 2024-11-03

## 2024-11-03 RX ORDER — ACETAMINOPHEN 325 MG/1
650 TABLET ORAL EVERY 6 HOURS PRN
Status: DISCONTINUED | OUTPATIENT
Start: 2024-11-03 | End: 2024-11-05 | Stop reason: HOSPADM

## 2024-11-03 RX ORDER — CALCIUM CARBONATE 200(500)MG
500 TABLET,CHEWABLE ORAL ONCE
Status: COMPLETED | OUTPATIENT
Start: 2024-11-03 | End: 2024-11-03

## 2024-11-03 RX ORDER — METHYLERGONOVINE MALEATE 0.2 MG/ML
200 INJECTION INTRAVENOUS ONCE AS NEEDED
Status: DISCONTINUED | OUTPATIENT
Start: 2024-11-03 | End: 2024-11-05 | Stop reason: HOSPADM

## 2024-11-03 RX ORDER — ONDANSETRON 8 MG/1
8 TABLET, ORALLY DISINTEGRATING ORAL EVERY 8 HOURS PRN
Status: DISCONTINUED | OUTPATIENT
Start: 2024-11-03 | End: 2024-11-05 | Stop reason: HOSPADM

## 2024-11-03 RX ORDER — FENTANYL/BUPIVACAINE/NS/PF 2MCG/ML-.1
PLASTIC BAG, INJECTION (ML) INJECTION
Status: COMPLETED
Start: 2024-11-03 | End: 2024-11-03

## 2024-11-03 RX ORDER — HYDROCODONE BITARTRATE AND ACETAMINOPHEN 5; 325 MG/1; MG/1
1 TABLET ORAL EVERY 4 HOURS PRN
Status: DISCONTINUED | OUTPATIENT
Start: 2024-11-03 | End: 2024-11-05 | Stop reason: HOSPADM

## 2024-11-03 RX ORDER — DIPHENOXYLATE HYDROCHLORIDE AND ATROPINE SULFATE 2.5; .025 MG/1; MG/1
2 TABLET ORAL EVERY 6 HOURS PRN
Status: DISCONTINUED | OUTPATIENT
Start: 2024-11-03 | End: 2024-11-05 | Stop reason: HOSPADM

## 2024-11-03 RX ORDER — SODIUM CHLORIDE 0.9 % (FLUSH) 0.9 %
10 SYRINGE (ML) INJECTION EVERY 8 HOURS
Status: DISCONTINUED | OUTPATIENT
Start: 2024-11-03 | End: 2024-11-04

## 2024-11-03 RX ORDER — OXYTOCIN-SODIUM CHLORIDE 0.9% IV SOLN 30 UNIT/500ML 30-0.9/5 UT/ML-%
95 SOLUTION INTRAVENOUS ONCE AS NEEDED
Status: DISCONTINUED | OUTPATIENT
Start: 2024-11-03 | End: 2024-11-03

## 2024-11-03 RX ORDER — OXYTOCIN-SODIUM CHLORIDE 0.9% IV SOLN 30 UNIT/500ML 30-0.9/5 UT/ML-%
0-32 SOLUTION INTRAVENOUS CONTINUOUS
Status: DISCONTINUED | OUTPATIENT
Start: 2024-11-03 | End: 2024-11-03

## 2024-11-03 RX ORDER — IBUPROFEN 600 MG/1
600 TABLET ORAL EVERY 6 HOURS
Status: DISCONTINUED | OUTPATIENT
Start: 2024-11-03 | End: 2024-11-05 | Stop reason: HOSPADM

## 2024-11-03 RX ADMIN — DIPHENHYDRAMINE HYDROCHLORIDE 12.5 MG: 50 INJECTION INTRAMUSCULAR; INTRAVENOUS at 04:11

## 2024-11-03 RX ADMIN — Medication 4 MILLI-UNITS/MIN: at 07:11

## 2024-11-03 RX ADMIN — CALCIUM CARBONATE (ANTACID) CHEW TAB 500 MG 500 MG: 500 CHEW TAB at 10:11

## 2024-11-03 RX ADMIN — IBUPROFEN 600 MG: 600 TABLET, FILM COATED ORAL at 06:11

## 2024-11-03 RX ADMIN — LIDOCAINE HYDROCHLORIDE,EPINEPHRINE BITARTRATE 2.5 ML: 15; .005 INJECTION, SOLUTION EPIDURAL; INFILTRATION; INTRACAUDAL; PERINEURAL at 05:11

## 2024-11-03 RX ADMIN — SODIUM CHLORIDE, POTASSIUM CHLORIDE, SODIUM LACTATE AND CALCIUM CHLORIDE 1000 ML: 600; 310; 30; 20 INJECTION, SOLUTION INTRAVENOUS at 05:11

## 2024-11-03 RX ADMIN — FENTANYL CITRATE 2.5 ML: 50 INJECTION INTRAMUSCULAR; INTRAVENOUS at 05:11

## 2024-11-03 RX ADMIN — FENTANYL CITRATE 8 ML/HR: 50 INJECTION INTRAMUSCULAR; INTRAVENOUS at 05:11

## 2024-11-03 RX ADMIN — SODIUM CHLORIDE, POTASSIUM CHLORIDE, SODIUM LACTATE AND CALCIUM CHLORIDE: 600; 310; 30; 20 INJECTION, SOLUTION INTRAVENOUS at 07:11

## 2024-11-03 RX ADMIN — CALCIUM CARBONATE (ANTACID) CHEW TAB 500 MG 500 MG: 500 CHEW TAB at 05:11

## 2024-11-03 RX ADMIN — DIPHENHYDRAMINE HYDROCHLORIDE 25 MG: 25 CAPSULE ORAL at 10:11

## 2024-11-03 NOTE — PROGRESS NOTES
"LABOR NOTE    S:  Pt resting comfortably with epidural, no complaints.  Family at bedside and supportive.     O: /86   Pulse 99   Temp 98.5 °F (36.9 °C) (Oral)   Resp 18   Ht 5' 2" (1.575 m)   Wt 62.9 kg (138 lb 10.7 oz)   LMP 2024   SpO2 97%   Breastfeeding No   BMI 25.36 kg/m²     GENERAL: Calm and appropriate affect  NEURO: Alert, oriented, normal speech  ABDOMEN: Nontender, Fundus palpates soft between UC's.  FHT: Baseline 132, moderate variability, positive accels,  Cat 1, reassuring.  CTX: q 3-4  SVE: anterior lip, 100/+1. Very thin anterior lip, not quite reducible.     ASSESSMENT:   34 y.o.  IUP at 39w3d, FHT reassuring/ Cat 1    Patient Active Problem List   Diagnosis    Anal fissure    Fissure in ano    Social anxiety disorder    Chronic right-sided low back pain without sciatica    Chronic tension-type headache, not intractable    Episodic tension-type headache, not intractable    Chronic neck and back pain    Low-lying placenta RESOLVED    Normal labor         PLAN:  Continue close Maternal/Fetal monitoring  Pitocin Augmentation per protocol - currently infusing at 8 mu  SVE in 1-2 hours or prn. Frequent position changes encouraged. Dr. Dorsey ob attending and resident team updated on status/plan. Anticipate .  "

## 2024-11-03 NOTE — HOSPITAL COURSE
24 Daphne Sullivan is a 34 y.o. female  at 39w2d with Estimated Date of Delivery: 24 based on t1 US who presents to Labor and Delivery accompanied by .   Reports regular uterine contractions since 0700. They are now 2-3 minutes apart and increasing in intensity and duration.  strong contractions, active fetal movement, No vaginal bleeding , No loss of fluid. Gbs NEG and desires NCB.      24: PPD1, normal involution, doing well, baby is in NICU. Considering d/c home today to be with family.     24- PP Day #2, 34 year old  s/p  of live infant male at 39 + 5/7 weeks. 2nd degree perineal laceration. Infant to NICU after delivery for questionable seizure activity. Remains in NICU. Doing well.   Daphne is tolerating a regular diet without nausea or vomiting. VSS. Diagnosed with gestational HTN intrapartum. Normotensive for > 24 hours. No S&S of pre eclampsia. AST mildly elevated. Has CONNECTED MOM at home and will monitor blood pressure as well as f/u for 1 week b/p check after discharge.  She is voiding spontaneously. She is ambulating without feeling dizzy or lightheaded. No Anemia. She has passed flatus, and has not a BM. Vaginal bleeding is mild. She denies fever or chills. Abdominal pain is mild and controlled with oral medications. She Is breastfeeding without S&S of mastitis or engorgement.  Known hx of Anxiety, no current meds. Feels she is doing well. Has great family support at home and will be able to visit infant in NICU frequently. Discussed PP depression/anxiety and plan 2 week mood check after discharge. D/C home today.  
54.4

## 2024-11-03 NOTE — ANESTHESIA PROCEDURE NOTES
CSE    Patient location during procedure: OB  Start time: 11/3/2024 5:46 AM  Timeout: 11/3/2024 5:42 AM  End time: 11/3/2024 5:52 AM    Reason for block: labor analgesia requested by patient and obstetrician    Staffing  Authorizing Provider: Yuliana Cosby MD  Performing Provider: Yuliana Cosby MD    Staffing  Performed by: Yuliana Cosby MD  Authorized by: Yuliana Cosby MD    Preanesthetic Checklist  Completed: patient identified, IV checked, site marked, risks and benefits discussed, surgical consent, monitors and equipment checked, pre-op evaluation and timeout performed  CSE  Patient position: sitting  Prep: ChloraPrep  Patient monitoring: continuous pulse ox and frequent blood pressure checks  Approach: midline  Spinal Needle  Needle type: pencil-tip   Needle gauge: 25 G  Needle length: 5 in  Epidural Needle  Injection technique: YURI saline  Needle type: Tuohy   Needle gauge: 17 G  Needle length: 3.5 in  Needle insertion depth: 4.5 cm  Location: L3-4  Needle localization: anatomical landmarks   Catheter  Catheter type: [place]  Catheter size: 19 G  Catheter at skin depth: 9 cm  Test dose: lidocaine 1.5% with Epi 1-to-200,000  Test dose: 3 mL  Additional Documentation: incremental injection, negative aspiration for CSF, negative aspiration for heme and negative test dose  Additional Notes  Easy, no complications

## 2024-11-03 NOTE — SUBJECTIVE & OBJECTIVE
Obstetric HPI:  Daphne Sullivan is a 34 y.o. female  at 39w2d with Estimated Date of Delivery: 24 based on t1 US who presents to Labor and Delivery accompanied by .   Reports regular uterine contractions since 0700. They are now 2-3 minutes apart and increasing in intensity and duration.  strong contractions, active fetal movement, No vaginal bleeding , No loss of fluid. Gbs NEG and desires NCB.      Last ate 1000pm a banana, last drank water at 1130 pm.      This pregnancy has been uncomplicated.  Patient Active Problem List:     Anal fissure     Fissure in ano     Social anxiety disorder     Chronic right-sided low back pain without sciatica     Chronic tension-type headache, not intractable     Episodic tension-type headache, not intractable     Chronic neck and back pain     Low-lying placenta RESOLVED     Normal labor  This pregnancy has been uncomplicated    OB History    Para Term  AB Living   1 0 0 0 0 0   SAB IAB Ectopic Multiple Live Births   0 0 0 0 0      # Outcome Date GA Lbr Carlos/2nd Weight Sex Type Anes PTL Lv   1 Current              Past Medical History:   Diagnosis Date    Anxiety disorder      Past Surgical History:   Procedure Laterality Date    NO PAST SURGERIES         PTA Medications   Medication Sig    hydrocortisone 2.5 % cream Apply topically 2 (two) times daily.    LIDOcaine HCl-hydrocortison ac 3-1 % (7 gram) Kit Place 1 applicator rectally 2 (two) times daily as needed (pain). (Patient not taking: Reported on 10/22/2024)    prenatal vit no.124/iron/folic (PRENATAL VITAMIN ORAL) Take 1 Dose by mouth Daily.       Review of patient's allergies indicates:  No Known Allergies     Family History       Problem Relation (Age of Onset)    Heart disease Father (67)          Tobacco Use    Smoking status: Never    Smokeless tobacco: Never   Substance and Sexual Activity    Alcohol use: Not Currently     Alcohol/week: 2.0 standard drinks of alcohol     Types: 2  Standard drinks or equivalent per week     Comment: socially    Drug use: No    Sexual activity: Yes     Partners: Male     Review of Systems   Gastrointestinal:         Gravid   All other systems reviewed and are negative.     Objective:     Vital Signs (Most Recent):  Temp: 98 °F (36.7 °C) (11/02/24 2355)  Pulse: 88 (11/02/24 2355)  Resp: 18 (11/02/24 2305)  BP: 120/60 (11/02/24 2300)  SpO2: 99 % (11/02/24 2355) Vital Signs (24h Range):  Temp:  [98 °F (36.7 °C)] 98 °F (36.7 °C)  Pulse:  [] 88  Resp:  [18] 18  SpO2:  [99 %] 99 %  BP: (120)/(60) 120/60        There is no height or weight on file to calculate BMI.    FHT: 140Cat 1 (reassuring)  TOCO:  Q 1-3 minutes     Physical Exam:   Constitutional: She is oriented to person, place, and time. She appears well-developed and well-nourished.    HENT:   Head: Normocephalic.    Eyes: Pupils are equal, round, and reactive to light.     Cardiovascular:  Normal rate.             Pulmonary/Chest: Effort normal.        Abdominal: Soft.     Genitourinary:    Vagina and uterus normal.             Musculoskeletal: Normal range of motion.       Neurological: She is alert and oriented to person, place, and time.    Skin: Skin is warm and dry.    Psychiatric: She has a normal mood and affect. Her behavior is normal. Judgment and thought content normal.        Cervix:  Dilation:  5  Effacement:  100%  Station: -2  Presentation: Vertex verified by BS US SEE MEDIA      Significant Labs:  Lab Results   Component Value Date    GROUPTRH O POS 04/09/2024    HEPBSAG Non-reactive 04/09/2024       I have personallly reviewed all pertinent lab results from the last 24 hours.

## 2024-11-03 NOTE — PROGRESS NOTES
"LABOR NOTE    S: Comfortable, epidural in place. Spouse and  supporting.    O: BP (!) 147/74   Pulse 87   Temp 98.5 °F (36.9 °C) (Oral)   Resp 18   Ht 5' 2" (1.575 m)   Wt 62.9 kg (138 lb 10.7 oz)   LMP 2024   SpO2 100%   Breastfeeding No   BMI 25.36 kg/m²     GENERAL: Calm and appropriate affect  NEURO: Alert, oriented, normal speech  ABDOMEN: Nontender, Fundus palpates soft between UC's.  FHT: Baseline 130, moderate BTBV, positive accels, intermittent mild variables.   CTX: q 3-4 minutes  SVE: 100/100/+2      ASSESSMENT:   34 y.o.  IUP at 39w3d, FHT reassuring/ Cat 1    Patient Active Problem List   Diagnosis    Anal fissure    Fissure in ano    Social anxiety disorder    Chronic right-sided low back pain without sciatica    Chronic tension-type headache, not intractable    Episodic tension-type headache, not intractable    Chronic neck and back pain    Low-lying placenta RESOLVED    Normal labor       PLAN:  Continue close Maternal/Fetal monitoring  Pitocin Augmentation per protocol - currently infusing at 10mu  Begin to push.   "

## 2024-11-03 NOTE — ASSESSMENT & PLAN NOTE
Few mild range blood pressures in labor 140s/90s. No S&S of pre eclampsia. CMP repeat ordered this morning.  1 week b/p check after delivery planned.

## 2024-11-03 NOTE — PROGRESS NOTES
S: PT still feeling urge to push. Breathing through ctx. Doulas and  rem at bs.   O:  VS reviewed, afebrile   Vitals:    11/03/24 0606 11/03/24 0608 11/03/24 0611 11/03/24 0612   BP: 134/79      Pulse: 72 73 75 71   Resp:       Temp:       TempSrc:       SpO2: 99%  99%        FHTs:128 cat 1 reassuring   UC: q1-2 moderate   SVE:Cervix unchanged. ANT cervix remains edematous from 9  o'clock to 2 o'clock. 9/100/-1. Scant amount of pink tinged blood noted to her pad, no active trickling noted. PT and  aware of exam findings. Pt is requesting an epidural.     A: IUP @ 39w3d ;     Patient Active Problem List   Diagnosis    Anal fissure    Fissure in ano    Social anxiety disorder    Chronic right-sided low back pain without sciatica    Chronic tension-type headache, not intractable    Episodic tension-type headache, not intractable    Chronic neck and back pain    Low-lying placenta RESOLVED    Normal labor       P: Anesthesia notified of epidural request  Cont close maternal fetal observation  Staff aware of clinical findings

## 2024-11-03 NOTE — ANESTHESIA PREPROCEDURE EVALUATION
[facility]  Anesthesia Pre-Operative Evaluation         Patient Name: Daphne Sullivan  YOB: 1990  MRN: [id number]    2024      Daphne Sullivan is a 34 y.o. female  @ 39w3d who presents to [facility] with painful contractions.     Denies previous issues with neuraxial anesthesia.    Denies previous issues with general anesthesia.    Denies family history of issues with anesthesia.     Denies hx of seizures, strokes, heart failure, smoking, asthma, COPD, acid reflux, liver disease, kidney disease, bleeding disorders, taking blood thinners, back surgery      OB History    Para Term  AB Living   1         0   SAB IAB Ectopic Multiple Live Births           0      # Outcome Date GA Lbr Carlos/2nd Weight Sex Type Anes PTL Lv   1 Current                Review of patient's allergies indicates:  No Known Allergies    Wt Readings from Last 1 Encounters:   10/22/24 1427 62.9 kg (138 lb 12.5 oz)       BP Readings from Last 3 Encounters:   24 128/78   10/29/24 128/76   10/22/24 122/79       Patient Active Problem List   Diagnosis    Anal fissure    Fissure in ano    Social anxiety disorder    Chronic right-sided low back pain without sciatica    Chronic tension-type headache, not intractable    Episodic tension-type headache, not intractable    Chronic neck and back pain    Low-lying placenta RESOLVED    Normal labor       Past Surgical History:   Procedure Laterality Date    NO PAST SURGERIES         Social History     Socioeconomic History    Marital status: Significant Other   Occupational History    Occupation:    Tobacco Use    Smoking status: Never    Smokeless tobacco: Never   Substance and Sexual Activity    Alcohol use: Not Currently     Alcohol/week: 2.0 standard drinks of alcohol     Types: 2 Standard drinks or equivalent per week     Comment: socially    Drug use: No    Sexual activity: Yes     Partners: Male         Chemistry       "  Component Value Date/Time     01/10/2024 0917    K 4.2 01/10/2024 0917     01/10/2024 0917    CO2 23 01/10/2024 0917    BUN 9 01/10/2024 0917    CREATININE 0.9 01/10/2024 0917    GLU 72 01/10/2024 0917        Component Value Date/Time    CALCIUM 8.9 01/10/2024 0917    ALKPHOS 50 (L) 01/10/2024 0917    ALKPHOS 50 (L) 01/10/2024 0917    AST 18 01/10/2024 0917    AST 18 01/10/2024 0917    ALT 14 01/10/2024 0917    ALT 14 01/10/2024 0917    BILITOT 1.4 (H) 01/10/2024 0917    BILITOT 1.4 (H) 01/10/2024 0917    ESTGFRAFRICA >60 01/06/2011 0900    EGFRNONAA >60 01/06/2011 0900            Lab Results   Component Value Date    WBC 13.49 (H) 11/02/2024    HGB 13.3 11/02/2024    HCT 37.7 11/02/2024    MCV 90 11/02/2024     11/02/2024       No results for input(s): "PT", "INR", "PROTIME", "APTT" in the last 72 hours.                                                                                                                   11/03/2024  Daphne Sullivan is a 34 y.o., female.      Pre-op Assessment    I have reviewed the Patient Summary Reports.     I have reviewed the Nursing Notes. I have reviewed the NPO Status.   I have reviewed the Medications.     Review of Systems  Anesthesia Hx:  No problems with previous Anesthesia   History of prior surgery of interest to airway management or planning:          Denies Family Hx of Anesthesia complications.    Denies Personal Hx of Anesthesia complications.                    Cardiovascular:      Denies Hypertension.   Denies MI.  Denies CAD.                                          Hepatic/GI:  Hepatic/GI Normal                    Neurological:      Headaches                                 Endocrine:  Endocrine Normal            Psych:  Psychiatric History anxiety                 Physical Exam  General: Well nourished, Cooperative, Alert and Oriented    Airway:  Mallampati: I   Mouth Opening: Normal  TM Distance: Normal  Tongue: Normal  Neck ROM: Normal " ROM    Dental:  Intact    Chest/Lungs:  Clear to auscultation, Normal Respiratory Rate    Heart:  Rate: Normal  Rhythm: Regular Rhythm        Anesthesia Plan  Type of Anesthesia, risks & benefits discussed:    Anesthesia Type: Epidural, CSE  Intra-op Monitoring Plan: Standard ASA Monitors  Post Op Pain Control Plan: multimodal analgesia and IV/PO Opioids PRN  Informed Consent: Informed consent signed with the Patient and all parties understand the risks and agree with anesthesia plan.  All questions answered.   ASA Score: 2  Day of Surgery Review of History & Physical: H&P Update referred to the surgeon/provider.    Ready For Surgery From Anesthesia Perspective.     .

## 2024-11-03 NOTE — PROGRESS NOTES
LABOR NOTE    S:  Pt resting comfortably with epidural, no complaints. Supportive spouse and  at beside.    O: /61   Pulse 70   Temp 98 °F (36.7 °C) (Axillary)   Resp 18   LMP 2024   SpO2 97%   Breastfeeding No     GENERAL: Calm and appropriate affect  NEURO: Alert, oriented, normal speech  ABDOMEN: Nontender, Fundus palpates soft between UC's.  FHT: Baseline 135, moderate, positive accels, intermittent mild variable decels. Reassuring maternal fetal status.  CTX: q 6-8 minutes  SVE: 9/100/-1, vtx. Cervix edematous upper/anterior 50%.      ASSESSMENT:   34 y.o.  IUP at 39w3d, FHT reassuring/ Cat 1    Patient Active Problem List   Diagnosis    Anal fissure    Fissure in ano    Social anxiety disorder    Chronic right-sided low back pain without sciatica    Chronic tension-type headache, not intractable    Episodic tension-type headache, not intractable    Chronic neck and back pain    Low-lying placenta RESOLVED    Normal labor         PLAN:  Continue close Maternal/Fetal monitoring  Begin Pitocin Augmentation per protocol per high risk protocol. Risks, benefits/discussed. All questions answered.  Recheck 2-4 hours or PRN  Epidural per anesthesia at pt's request      Angelica Mistry CNM

## 2024-11-03 NOTE — PROGRESS NOTES
S:PT laboring in the tub with 2 Doulas and her  present , reports the urge to push.   O:  VS reviewed, afebrile   Vitals:    11/02/24 2355 11/03/24 0021 11/03/24 0022 11/03/24 0126   BP:  128/78  (!) 110/56   Pulse: 88 109 90 77   Resp:  18  17   Temp: 98 °F (36.7 °C) 98.2 °F (36.8 °C)  98 °F (36.7 °C)   TempSrc: Oral Oral  Axillary   SpO2: 99%          FHTs:  with audible accelerations   UC: q1 -2, moderate by palpation  SVE: deferred at pt request  While monitoring pt, noted small amount of vaginal bleeding, not mucous tinged. DW pt and  that I will suggest her to return to the bed for cont efm and close monitoring with a VE. On exam cervix noted with edematous ant lip and 9 cm/100/-1. PT srom 330 am clear fluid while in the tub per the RN.   Suggested to stop pushing DT edema and risk for cervical tear/laceration. FHR with CEFM: Cat 1 , reassuring. CTX Q 1 MODERATE  New orders for Benadryl 12.5 mg IVP x 1 dose now for cervical edema.   A: IUP @ 39w3d ;     Patient Active Problem List   Diagnosis    Anal fissure    Fissure in ano    Social anxiety disorder    Chronic right-sided low back pain without sciatica    Chronic tension-type headache, not intractable    Episodic tension-type headache, not intractable    Chronic neck and back pain    Low-lying placenta RESOLVED    Normal labor       P: Cont close fetal maternal observation  Staff updated on exam and findings  REM AT BS for monitoring

## 2024-11-03 NOTE — H&P
[facility] Labor & Delivery  Obstetrics  History & Physical    Patient Name: Daphne Sullivan  MRN: [id number]  Admission Date: 2024  Primary Care Provider: Indigo Meneses MD    Subjective:     Principal Problem:Normal labor    History of Present Illness:  No notes on file    Obstetric HPI:  Daphne Sullivan is a 34 y.o. female  at 39w2d with Estimated Date of Delivery: 24 based on t1 US who presents to Labor and Delivery accompanied by .   Reports regular uterine contractions since 0700. They are now 2-3 minutes apart and increasing in intensity and duration.  strong contractions, active fetal movement, No vaginal bleeding , No loss of fluid. Gbs NEG and desires NCB.      Last ate 1000pm a banana, last drank water at 1130 pm.      This pregnancy has been uncomplicated.  Patient Active Problem List:     Anal fissure     Fissure in ano     Social anxiety disorder     Chronic right-sided low back pain without sciatica     Chronic tension-type headache, not intractable     Episodic tension-type headache, not intractable     Chronic neck and back pain     Low-lying placenta RESOLVED     Normal labor  This pregnancy has been uncomplicated    OB History    Para Term  AB Living   1 0 0 0 0 0   SAB IAB Ectopic Multiple Live Births   0 0 0 0 0      # Outcome Date GA Lbr Carlos/2nd Weight Sex Type Anes PTL Lv   1 Current              Past Medical History:   Diagnosis Date    Anxiety disorder      Past Surgical History:   Procedure Laterality Date    NO PAST SURGERIES         PTA Medications   Medication Sig    hydrocortisone 2.5 % cream Apply topically 2 (two) times daily.    LIDOcaine HCl-hydrocortison ac 3-1 % (7 gram) Kit Place 1 applicator rectally 2 (two) times daily as needed (pain). (Patient not taking: Reported on 10/22/2024)    prenatal vit no.124/iron/folic (PRENATAL VITAMIN ORAL) Take 1 Dose by mouth Daily.       Review of patient's allergies indicates:  No Known  Allergies     Family History       Problem Relation (Age of Onset)    Heart disease Father (67)          Tobacco Use    Smoking status: Never    Smokeless tobacco: Never   Substance and Sexual Activity    Alcohol use: Not Currently     Alcohol/week: 2.0 standard drinks of alcohol     Types: 2 Standard drinks or equivalent per week     Comment: socially    Drug use: No    Sexual activity: Yes     Partners: Male     Review of Systems   Gastrointestinal:         Gravid   All other systems reviewed and are negative.     Objective:     Vital Signs (Most Recent):  Temp: 98 °F (36.7 °C) (11/02/24 2355)  Pulse: 88 (11/02/24 2355)  Resp: 18 (11/02/24 2305)  BP: 120/60 (11/02/24 2300)  SpO2: 99 % (11/02/24 2355) Vital Signs (24h Range):  Temp:  [98 °F (36.7 °C)] 98 °F (36.7 °C)  Pulse:  [] 88  Resp:  [18] 18  SpO2:  [99 %] 99 %  BP: (120)/(60) 120/60        There is no height or weight on file to calculate BMI.    FHT: 140Cat 1 (reassuring)  TOCO:  Q 1-3 minutes     Physical Exam:   Constitutional: She is oriented to person, place, and time. She appears well-developed and well-nourished.    HENT:   Head: Normocephalic.    Eyes: Pupils are equal, round, and reactive to light.     Cardiovascular:  Normal rate.             Pulmonary/Chest: Effort normal.        Abdominal: Soft.     Genitourinary:    Vagina and uterus normal.             Musculoskeletal: Normal range of motion.       Neurological: She is alert and oriented to person, place, and time.    Skin: Skin is warm and dry.    Psychiatric: She has a normal mood and affect. Her behavior is normal. Judgment and thought content normal.        Cervix:  Dilation:  5  Effacement:  100%  Station: -2  Presentation: Vertex verified by BS US SEE MEDIA      Significant Labs:  Lab Results   Component Value Date    GROUPTRH O POS 04/09/2024    HEPBSAG Non-reactive 04/09/2024       I have personallly reviewed all pertinent lab results from the last 24 hours.  Assessment/Plan:      34 y.o. female  at 39w3d for:    * Normal labor  EXPECTANT MANAGEMENT PT DESIRES NCB        Luz Marina Guerra, [email or web address]  Obstetrics  [facility] - Labor & Delivery

## 2024-11-03 NOTE — ED PROVIDER NOTES
Encounter Date: 2024       History     Chief Complaint   Patient presents with    Contractions    Rupture of Membranes     Daphne Sullivan is a 34 y.o. female  at 39w2d with Estimated Date of Delivery: 24 based on t1 US who presents to Labor and Delivery accompanied by .   Reports regular uterine contractions since 0700. They are now 2-3 minutes apart and increasing in intensity and duration.  strong contractions, active fetal movement, No vaginal bleeding , No loss of fluid. Gbs NEG and desires NCB.     Last ate 1000pm a banana, last drank water at 1130 pm.     This pregnancy has been uncomplicated.  Patient Active Problem List:     Anal fissure     Fissure in ano     Social anxiety disorder     Chronic right-sided low back pain without sciatica     Chronic tension-type headache, not intractable     Episodic tension-type headache, not intractable     Chronic neck and back pain     Low-lying placenta RESOLVED     Normal labor       Presentation: Vtx verified by BS US, SEE MEDIA   Estimated Fetal Weight: 6.5 #    Birth Center Risk Assessment: 1-management on labor and Delivery    CNM management in ABC  CNM management on L&D  Consultation with OB to develop  plan of care  Collaborative CNM/OB management with delivery on L&D   4-   Permanent referral of care to MD       Review of patient's allergies indicates:  No Known Allergies  Past Medical History:   Diagnosis Date    Anxiety disorder      Past Surgical History:   Procedure Laterality Date    NO PAST SURGERIES       Family History   Problem Relation Name Age of Onset    Heart disease Father  67        CAD--MI    Breast cancer Neg Hx      Cancer Neg Hx      Colon cancer Neg Hx      Diabetes Neg Hx      Eclampsia Neg Hx      Ovarian cancer Neg Hx       labor Neg Hx      Hypertension Neg Hx      Miscarriages / Stillbirths Neg Hx      Stroke Neg Hx       Social History     Tobacco Use    Smoking status: Never    Smokeless tobacco:  Never   Substance Use Topics    Alcohol use: Not Currently     Alcohol/week: 2.0 standard drinks of alcohol     Types: 2 Standard drinks or equivalent per week     Comment: socially    Drug use: No     Review of Systems   All other systems reviewed and are negative.      Physical Exam     Initial Vitals   BP Pulse Resp Temp SpO2   11/02/24 2300 11/02/24 2305 11/02/24 2305 -- 11/02/24 2305   120/60 108 18  99 %      MAP       --                Physical Exam    Vitals reviewed.  Constitutional: She appears well-developed and well-nourished.   HENT:   Head: Normocephalic and atraumatic.   Eyes: EOM are normal. Pupils are equal, round, and reactive to light.   Neck:   Normal range of motion.  Cardiovascular:  Normal rate.           Pulmonary/Chest: Breath sounds normal.   Abdominal: Abdomen is soft.   Genitourinary:    Vagina and uterus normal.     Musculoskeletal:         General: Normal range of motion.      Cervical back: Normal range of motion.     Neurological: She is alert and oriented to person, place, and time.   Skin: Skin is warm and dry.   Psychiatric: She has a normal mood and affect. Her behavior is normal. Judgment and thought content normal.         ED Course   Obtain Fetal nonstress test (NST)    Date/Time: 11/2/2024 11:47 PM    Performed by: Luz Marina Guerra CNM  Authorized by: Luz Marina Guerra CNM    Nonstress Test:     Variability:  6-25 BPM    Decelerations:  None    Accelerations:  15 bpm    Acoustic Stimulator: No      Baseline:  140    Uterine Irritability: No      Contractions:  Regular    Contraction Frequency:  1-3  Biophysical Profile:     Nonstress Test Interpretation: reactive      Overall Impression:  Reassuring  Post-procedure:     Patient tolerance:  Patient tolerated the procedure well with no immediate complications    Labs Reviewed   CBC W/ AUTO DIFFERENTIAL   TREPONEMA PALLIDIUM ANTIBODIES IGG, IGM   TYPE AND SCREEN LABOR & DELIVERY          Imaging Results    None           Medications   lactated ringers bolus 1,000 mL (has no administration in time range)   lactated ringers bolus 500 mL (has no administration in time range)   lactated ringers infusion (has no administration in time range)   0.9%  NaCl infusion (has no administration in time range)   mupirocin 2 % ointment (has no administration in time range)   oxytocin 30 units/500 mL (60 milliunits/mL) in 0.9% NaCl IV bolus from bag (has no administration in time range)   oxytocin 30 units/500 mL (60 milliunits/mL) in 0.9% NaCl (non-titrating) (has no administration in time range)   ondansetron disintegrating tablet 8 mg (has no administration in time range)   calcium carbonate 200 mg calcium (500 mg) chewable tablet 500 mg (has no administration in time range)   simethicone chewable tablet 80 mg (has no administration in time range)   LIDOcaine HCL 10 mg/ml (1%) injection 10 mL (has no administration in time range)   oxytocin 30 units/500 mL (60 milliunits/mL) in 0.9% NaCl IV bolus from bag (has no administration in time range)   oxytocin 30 units/500 mL (60 milliunits/mL) in 0.9% NaCl (non-titrating) (has no administration in time range)   oxytocin injection 10 Units (has no administration in time range)   miSOPROStoL tablet 800 mcg (has no administration in time range)   miSOPROStoL tablet 800 mcg (has no administration in time range)   methylergonovine injection 200 mcg (has no administration in time range)   carboprost injection 250 mcg (has no administration in time range)   tranexamic acid in NaCl,iso-os IVPB 1,000 mg (has no administration in time range)   diphenoxylate-atropine 2.5-0.025 mg per tablet 2 tablet (has no administration in time range)     Medical Decision Making  33 yo  presents to [facility] with c/o ctx Q1-3 min. GBS neg, NST reactive. Desires NCB. Cervix: 5/100/-2, VTX verified by BS US. DW Dr Arevalo, will admit for labor.     Amount and/or Complexity of Data Reviewed  Labs: ordered.  Discussion of management  or test interpretation with external provider(s): MAURICE Arevalo     Risk  OTC drugs.  Prescription drug management.  Decision regarding hospitalization.              Attending Attestation:   Physician Attestation Statement for Resident:  As the supervising MD   Physician Attestation Statement: I have personally seen and examined this patient.   I agree with the above history.  -:   As the supervising MD I agree with the above PE.     As the supervising MD I agree with the above treatment, course, plan, and disposition.   I was personally present during the critical portions of the procedure(s) performed by the resident and was immediately available in the ED to provide services and assistance as needed during the entire procedure.   I have reviewed the following: old records at this facility.                                       Clinical Impression:  Final diagnoses:  [O47.9] Threatened labor at term (Primary)          ED Disposition Condition    Admit                 Marycarmen Arevalo MD  11/03/24 0733

## 2024-11-04 LAB
ALBUMIN SERPL BCP-MCNC: 2.1 G/DL (ref 3.5–5.2)
ALP SERPL-CCNC: 212 U/L (ref 40–150)
ALT SERPL W/O P-5'-P-CCNC: 33 U/L (ref 10–44)
ANION GAP SERPL CALC-SCNC: 7 MMOL/L (ref 8–16)
AST SERPL-CCNC: 66 U/L (ref 10–40)
BASOPHILS # BLD AUTO: ABNORMAL K/UL (ref 0–0.2)
BASOPHILS NFR BLD: 0 % (ref 0–1.9)
BILIRUB SERPL-MCNC: 0.6 MG/DL (ref 0.1–1)
BUN SERPL-MCNC: 12 MG/DL (ref 6–20)
CALCIUM SERPL-MCNC: 8.1 MG/DL (ref 8.7–10.5)
CHLORIDE SERPL-SCNC: 112 MMOL/L (ref 95–110)
CO2 SERPL-SCNC: 18 MMOL/L (ref 23–29)
CREAT SERPL-MCNC: 1 MG/DL (ref 0.5–1.4)
DIFFERENTIAL METHOD BLD: ABNORMAL
EOSINOPHIL # BLD AUTO: ABNORMAL K/UL (ref 0–0.5)
EOSINOPHIL NFR BLD: 0 % (ref 0–8)
ERYTHROCYTE [DISTWIDTH] IN BLOOD BY AUTOMATED COUNT: 13.2 % (ref 11.5–14.5)
EST. GFR  (NO RACE VARIABLE): >60 ML/MIN/1.73 M^2
GLUCOSE SERPL-MCNC: 75 MG/DL (ref 70–110)
HCT VFR BLD AUTO: 33 % (ref 37–48.5)
HGB BLD-MCNC: 11.2 G/DL (ref 12–16)
IMM GRANULOCYTES # BLD AUTO: ABNORMAL K/UL (ref 0–0.04)
IMM GRANULOCYTES NFR BLD AUTO: ABNORMAL % (ref 0–0.5)
LYMPHOCYTES # BLD AUTO: ABNORMAL K/UL (ref 1–4.8)
LYMPHOCYTES NFR BLD: 7 % (ref 18–48)
MCH RBC QN AUTO: 31.6 PG (ref 27–31)
MCHC RBC AUTO-ENTMCNC: 33.9 G/DL (ref 32–36)
MCV RBC AUTO: 93 FL (ref 82–98)
MONOCYTES # BLD AUTO: ABNORMAL K/UL (ref 0.3–1)
MONOCYTES NFR BLD: 2 % (ref 4–15)
NEUTROPHILS NFR BLD: 91 % (ref 38–73)
NRBC BLD-RTO: 0 /100 WBC
PLATELET # BLD AUTO: 214 K/UL (ref 150–450)
PLATELET BLD QL SMEAR: ABNORMAL
PMV BLD AUTO: 10.6 FL (ref 9.2–12.9)
POTASSIUM SERPL-SCNC: 4.3 MMOL/L (ref 3.5–5.1)
PROT SERPL-MCNC: 5.1 G/DL (ref 6–8.4)
RBC # BLD AUTO: 3.54 M/UL (ref 4–5.4)
SODIUM SERPL-SCNC: 137 MMOL/L (ref 136–145)
WBC # BLD AUTO: 26.83 K/UL (ref 3.9–12.7)

## 2024-11-04 PROCEDURE — 80053 COMPREHEN METABOLIC PANEL: CPT

## 2024-11-04 PROCEDURE — 25000003 PHARM REV CODE 250: Performed by: ADVANCED PRACTICE MIDWIFE

## 2024-11-04 PROCEDURE — 11000001 HC ACUTE MED/SURG PRIVATE ROOM

## 2024-11-04 PROCEDURE — 36415 COLL VENOUS BLD VENIPUNCTURE: CPT

## 2024-11-04 PROCEDURE — 36415 COLL VENOUS BLD VENIPUNCTURE: CPT | Performed by: ADVANCED PRACTICE MIDWIFE

## 2024-11-04 PROCEDURE — 85007 BL SMEAR W/DIFF WBC COUNT: CPT | Performed by: ADVANCED PRACTICE MIDWIFE

## 2024-11-04 PROCEDURE — 85027 COMPLETE CBC AUTOMATED: CPT | Performed by: ADVANCED PRACTICE MIDWIFE

## 2024-11-04 RX ADMIN — IBUPROFEN 600 MG: 600 TABLET, FILM COATED ORAL at 06:11

## 2024-11-04 RX ADMIN — HYDROCODONE BITARTRATE AND ACETAMINOPHEN 1 TABLET: 5; 325 TABLET ORAL at 11:11

## 2024-11-04 RX ADMIN — DOCUSATE SODIUM 200 MG: 100 CAPSULE, LIQUID FILLED ORAL at 07:11

## 2024-11-04 RX ADMIN — ACETAMINOPHEN 650 MG: 325 TABLET, FILM COATED ORAL at 06:11

## 2024-11-04 RX ADMIN — IBUPROFEN 600 MG: 600 TABLET, FILM COATED ORAL at 05:11

## 2024-11-04 RX ADMIN — IBUPROFEN 600 MG: 600 TABLET, FILM COATED ORAL at 12:11

## 2024-11-04 RX ADMIN — ACETAMINOPHEN 650 MG: 325 TABLET, FILM COATED ORAL at 11:11

## 2024-11-04 RX ADMIN — PRENATAL VIT W/ FE FUMARATE-FA TAB 27-0.8 MG 1 TABLET: 27-0.8 TAB at 07:11

## 2024-11-04 RX ADMIN — IBUPROFEN 600 MG: 600 TABLET, FILM COATED ORAL at 11:11

## 2024-11-04 RX ADMIN — ACETAMINOPHEN 650 MG: 325 TABLET, FILM COATED ORAL at 05:11

## 2024-11-04 RX ADMIN — DOCUSATE SODIUM 200 MG: 100 CAPSULE, LIQUID FILLED ORAL at 12:11

## 2024-11-04 NOTE — PLAN OF CARE
Copied from baby's chart    SOCIAL WORK DISCHARGE PLANNING ASSESSMENT    SW completed discharge planning assessment with pt's parents at pt's bedside.  Pt's parents were easily engaged. Education on the role of  was provided. Emotional support provided throughout assessment.      Legal Name: Moody Hou         :  11/3/2024  Address: [address] [place] [id number]  Parent's Phone Numbers: Daphne [phone]    Refugio [phone]    Pediatrician: None Selected. [facility] list provided.    Education: Information given on NICU Education Classes; Physician/NNP daily rounds; and Postpartum Depression signs.   Potential Eligibility for SSI Benefits: No      Patient Active Problem List   Diagnosis    Term  delivered vaginally, current hospitalization    Moderate hypoxic ischemic encephalopathy (HIE)    Need for observation and evaluation of  for sepsis    Respiratory distress in     Alteration in nutrition in infant    Healthcare maintenance    History of vascular access device         Birth Hospital:[facility]           ERICA: 24    Birth Weight:   3.25 kg (7 lb 2.6 oz)              Birth Length: 48.9 cm                      Gestational Age: 39w3d          Apgars    Living status: Living  Apgar Component Scores:  1 min.:  5 min.:  10 min.:  15 min.:  20 min.:    Skin color:   1  2      Heart rate:  1  2  2      Reflex irritability:   0  0      Muscle tone:   1  1      Respiratory effort:   1  2      Total:   5  7                24 1201   NICU Assessment   Assessment Type Discharge Planning Assessment   Source of Information family   Verified Demographic and Insurance Information Yes   Insurance Commercial   Commercial [employer]    Contact Status none needed   Lives With mother;father   Number people in home 3 including pt   Relationship Status of Parents    Primary Source of Support/Comfort parent   Mother Employed Full  Time   Father's Involvement Fully Involved   Is Father signing the birth certificate Yes   Father Name and  Alphonso Ameya    85   Father's Employer employed   Family Involvement High   Other Contacts Names and Numbers Argenis Sullivan (maternal aunt) [phone]   Stephanie Bowers Recalt [phone]   Infant Feeding Plan breastfeeding;expressed breast milk   Previous Breastfeeding Experience no   Breast Pump Needed no   Does baby have crib or safe sleep space? Yes   Do you have a car seat? Yes   Resource/Environmental Concerns none   Environment Concerns none   Resources/Education Provided [facility];Support Resources for NICU Families;My NICU Baby Jessica;Early Intervention Program;Post Partum Depression;Preparing for Your Baby's Discharge Home;Glossary of Commonly Used Terms   DME Needed Upon Discharge  none   DCFS No indications (Indicators for Report)   Discharge Plan A Home with family;Early Steps

## 2024-11-04 NOTE — PLAN OF CARE
VSS. Fundus midline and firm. Light lochia rubra noted.  Pain well controlled with pain medications. Able to ambulate to bathroom, unable to pee. States will try again once moved over to [facility]. In and out catheter performed at 1715 as per coreen RN. Patient safety maintained during transport. Handover report to Mora DESAI.    Problem:  Fall Injury Risk  Goal: Absence of Fall, Infant Drop and Related Injury  11/3/2024 2126 by Jeannine Meier RN  Outcome: Progressing  11/3/2024 2125 by Jeannine Meier RN  Outcome: Progressing     Problem: Infection  Goal: Absence of Infection Signs and Symptoms  11/3/2024 2126 by Jeannine Meier RN  Outcome: Progressing  11/3/2024 2125 by Jeannine Meier RN  Outcome: Progressing     Problem: Adult Inpatient Plan of Care  Goal: Plan of Care Review  11/3/2024 2126 by Jeannine Meier RN  Outcome: Progressing  11/3/2024 2125 by Jeannine Meier RN  Outcome: Progressing  Goal: Patient-Specific Goal (Individualized)  11/3/2024 2126 by Jeannine Meier RN  Outcome: Progressing  11/3/2024 2125 by Jeannine Meier RN  Outcome: Progressing  Goal: Absence of Hospital-Acquired Illness or Injury  Outcome: Progressing  Goal: Optimal Comfort and Wellbeing  Outcome: Progressing  Goal: Readiness for Transition of Care  Outcome: Progressing     Problem: Skin Injury Risk Increased  Goal: Skin Health and Integrity  Outcome: Progressing     Problem: Anesthesia/Analgesia, Neuraxial  Goal: Effective Urinary Elimination  Outcome: Progressing     Problem: Fall Injury Risk  Goal: Absence of Fall and Fall-Related Injury  Outcome: Progressing     Problem: Postpartum (Vaginal Delivery)  Goal: Successful Parent Role Transition  Outcome: Progressing  Goal: Hemostasis  Outcome: Progressing  Goal: Absence of Infection Signs and Symptoms  Outcome: Progressing  Goal: Anesthesia/Sedation Recovery  Outcome: Progressing  Goal: Optimal Pain Control and Function  Outcome: Progressing  Goal: Effective Urinary Elimination  Outcome: Progressing

## 2024-11-04 NOTE — LACTATION NOTE
Pt initiated on Medela breast pump on L&D. [facility]BU RN encouraged pt to call for help with the pump if needed.

## 2024-11-04 NOTE — CARE UPDATE
Continuously following external Fetal monitoring during course of pushing.  OVerall category II due to variables with contractions though good variability with each variable and return to baseline.  Noted to have deceleration followed by tachycardia with loss of variability, I went to room to discuss vacuum extraction, fetal head noted to be at +3, I instructed patient to push, pressure was applied to posterior vagina and head and baby delivered spontaneously with that push.  Cord clamped and cut and baby handed to the waiting nursery staff.  NICU present after handoff for resuscitation.  Cord gases obtained with results as follows:    POC PH 7.25 - 7.45 7.318 7.325 R   POC PCO2 27 - 49 mmHg 35.7 32.8 R   POC PO2 17 - 41 mmHg 29 30 R   POC HCO3 12 - 29 mmol/L 18.3 17.1 R   POC BE <=-9 mmol/L -8 -9 R   POC SATURATED O2 % 49 53   POC TCO2 23 - 27 mmol/L 19 Low  18 Low    POC Hematocrit 36 - 54 %PCV 50 49   POC HEMOGLOBIN g/dL 17 17   Sample  CORD BONIFACIO CORD ART   Site  Other Other   Allens Test  N/A N/A

## 2024-11-04 NOTE — LACTATION NOTE
This note was copied from a baby's chart.  Lactation Note: Met mother and father at bedside; Introduced self. Mother reports pumping and desire to breastfeed. Discussed the importance of frequent pumping in first two weeks to establish a full breast milk supply. Encouraged pumping 8 or more times in 24 hours and skin to skin care when baby able. Discussed pumping every 2-3 hours with only one 5-hour break without pumping for sleep. Encouraged hand expression with pumping in first 3-5 days. Recommended pumping schedule discussed. Benefits of breast milk for baby and benefits of providing breast milk for mother discussed. Encouraged oral immune therapy with fresh colostrum. Mother plans to rent Symphony breast pump. Encouragement and support offered to mom. Ghazala Thomas, BSN, RNC, CLC, IBCLC

## 2024-11-04 NOTE — L&D DELIVERY NOTE
[facility] - Labor & Delivery  Vaginal Delivery   Operative Note    SUMMARY   C/C/+2 at 1319. Good maternal expulsive pushing efforts. Continuous external fetal monitoring during course of pushing. Category II due to variables with contractions though good variability with each variable and return to baseline. Utilized numerous positions during pushing to facilitate rotation (OP). Dr. Dorsey, OB attending in house and updated periodically as was resident team. Patient desired to continue pushing. Dr. Dorsey presented to room after a fetal deceleration followed by tachycardia x approximately 3 minutes. Fetal head at +3 station. Pressure was applied to posterior vagina and head. Baby delivered spontaneously with that push at 1643. Cord clamped and cut and baby handed to the waiting nursery staff. NICU present after handoff for resuscitation. Cord gases obtained. Boy/male infant, Apgar Score of 5/7. Infant taken to NICU after mother allowed to hold for further evaluation. NICU NP discussed infant care and need for further evaluation with mother and father and all questions answered prior to leaving room.  1705-  of karla placenta, intact with 3 vessel cord, spontaneously. Hemostasis achieved easily with gentle fundal massage and Pitocin to infusing IV. A second degree perineal laceration was repaired using Chromic/CT in layers, the usual fashion while epidural continued to infusing. Tolerated very well. cc.  Sponge needle and lap counted correctly x2. Routine postpartum care initiated. Blood pressures reviewed. No S&S of pre eclampsia. Met criteria for gestational hypertension. A CMP was ordered will monitor accordingly as well as have follow up for b/p check after discharge.    Indications:  (normal spontaneous vaginal delivery)  Pregnancy complicated by:   Patient Active Problem List   Diagnosis    Anal fissure    Fissure in ano    Social anxiety disorder    Chronic right-sided low back pain without sciatica     Chronic tension-type headache, not intractable    Episodic tension-type headache, not intractable    Chronic neck and back pain    Low-lying placenta RESOLVED     (normal spontaneous vaginal delivery)    Obstetrical laceration    Gestational hypertension, third trimester     Admitting GA: 39w3d    Delivery Information for Jose Sullivan    Birth information:  YOB: 2026   Time of birth: 4:43 PM   Sex: male   Head Delivery Date/Time: 11/3/2024  4:43 PM   Delivery type: Vaginal, Spontaneous   Gestational Age: 39w3d       Delivery Providers    Delivering clinician: Angelica Mistry CNM   Provider Role    Shelly Manzo, RN Registered Nurse    Katerine Lewis RN Registered Nurse    Romy Maurer RN Registered Nurse    Macy Dorsey,                Measurements    Weight:   Length:          Apgars    Living status: Living  Apgar Component Scores:  1 min.:  5 min.:  10 min.:  15 min.:  20 min.:    Skin color:   1  2      Heart rate:  1  2  2      Reflex irritability:   0  0      Muscle tone:   1  1      Respiratory effort:   1  2      Total:   5  7               Operative Delivery    Forceps attempted?: No  Vacuum extractor attempted?: No         Shoulder Dystocia    Shoulder dystocia present?: No           Presentation    Presentation: Vertex  Position: Right Occiput Transverse           Interventions/Resuscitation    Method: NICU Attended       Cord    Vessels: 3 vessels  Complications: None  Cord Blood Disposition: Sent with Baby  Gases Sent?: Yes  Stem Cell Collection (by MD): No       Placenta    Placenta delivery date/time: 11/3/2024 1705  Placenta removal: Expressed  Placenta appearance: Intact  Placenta disposition: Family           Labor Events:       labor: No     Labor Onset Date/Time: 2024 01:40     Dilation Complete Date/Time: 2024 13:19     Start Pushing Date/Time: 2024 13:47       Start Pushing Date/Time: 2024 13:47     Rupture  Date/Time: 24 004        Rupture type: SRM (Spontaneous Rupture)        Fluid Amount:       Fluid Color: Clear              steroids: None     Antibiotics given for GBS: No     Induction:       Indications for induction:        Augmentation: oxytocin     Indications for augmentation:       Labor complications: None     Additional complications:          Cervical ripening:                     Delivery:      Episiotomy: None     Indication for Episiotomy:       Perineal Lacerations: 2nd Repaired:  Yes   Periurethral Laceration:   Repaired:     Labial Laceration:   Repaired:     Sulcus Laceration:   Repaired:     Vaginal Laceration:   Repaired:     Cervical Laceration:   Repaired:     Repair suture:       Repair # of packets: 1     Last Value - EBL - Nursing (mL):       Sum - EBL - Nursing (mL): 0     Last Value - EBL - Anesthesia (mL):      Calculated QBL (mL): 305     Running total QBL (mL): 305     Vaginal Sweep Performed: Yes     Surgicount Correct: Yes     Vaginal Packing: No Quantity:       Other providers:       Anesthesia    Method: Epidural          Details (if applicable):  Trial of Labor      Categorization:      Priority:     Indications for :     Incision Type:       Additional  information:  Forceps:    Vacuum:    Breech:    Observed anomalies    Other (Comments):

## 2024-11-04 NOTE — SUBJECTIVE & OBJECTIVE
Interval History: PPD1    She is doing well this morning. She is tolerating a regular diet without nausea or vomiting. She is voiding spontaneously. She is ambulating. She has passed flatus, and has not a BM. Vaginal bleeding is mild. She denies fever or chills. Abdominal pain is mild and controlled with oral medications. She Is breastfeeding (pumping d/t baby in NICU). Considering discharge today because family is at her house. Lives nearby and will come back to visit with baby in NICU if she decides to go home.       Objective:     Vital Signs (Most Recent):  Temp: 97.9 °F (36.6 °C) (11/04/24 0829)  Pulse: 76 (11/04/24 0829)  Resp: 17 (11/04/24 0829)  BP: (!) 101/55 (patient asymptomatic) (11/04/24 0829)  SpO2: 97 % (11/04/24 0829) Vital Signs (24h Range):  Temp:  [97.8 °F (36.6 °C)-98.7 °F (37.1 °C)] 97.9 °F (36.6 °C)  Pulse:  [] 76  Resp:  [16-20] 17  SpO2:  [96 %-100 %] 97 %  BP: (101-147)/() 101/55     Weight: 62.9 kg (138 lb 10.7 oz)  Body mass index is 25.36 kg/m².      Intake/Output Summary (Last 24 hours) at 11/4/2024 0945  Last data filed at 11/4/2024 0600  Gross per 24 hour   Intake 494.74 ml   Output 2105 ml   Net -1610.26 ml         Significant Labs:  Lab Results   Component Value Date    GROUPTRH O POS 11/02/2024    HEPBSAG Non-reactive 04/09/2024     Recent Labs   Lab 11/04/24 0629   HGB 11.2*   HCT 33.0*       I have personallly reviewed all pertinent lab results from the last 24 hours.    Physical Exam:   Constitutional: She is oriented to person, place, and time. She appears well-developed and well-nourished.    HENT:   Head: Normocephalic.    Eyes: Conjunctivae are normal.     Cardiovascular:  Normal rate.             Pulmonary/Chest: No respiratory distress.        Abdominal: There is no abdominal tenderness.     Genitourinary:    Vagina and uterus normal.             Musculoskeletal: Normal range of motion.       Neurological: She is alert and oriented to person, place, and time.     Skin: Skin is warm and dry.    Psychiatric: She has a normal mood and affect. Her behavior is normal. Judgment and thought content normal.       Review of Systems   Constitutional:  Negative for chills and fever.   Eyes:  Negative for visual disturbance.   Respiratory:  Negative for cough.    Cardiovascular:  Negative for chest pain.   Gastrointestinal:  Negative for nausea and vomiting.   Genitourinary:  Positive for vaginal bleeding.   Neurological:  Negative for seizures and headaches.   All other systems reviewed and are negative.

## 2024-11-04 NOTE — HPI
Daphne Sullivan is a 34 y.o. female  at 39w2d with Estimated Date of Delivery: 24 based on t1 US who presents to Labor and Delivery accompanied by .   Reports regular uterine contractions since 0700. They are now 2-3 minutes apart and increasing in intensity and duration.  strong contractions, active fetal movement, No vaginal bleeding , No loss of fluid. Gbs NEG and desires NCB.      Last ate 1000pm a banana, last drank water at 1130 pm.      This pregnancy has been uncomplicated.

## 2024-11-04 NOTE — PLAN OF CARE
Pt ambulating and voiding without difficulty. Patient safety maintained, side rails up x2, bed low and locked position. Pain well controlled with scheduled pain medication. Fundus midline, firm, with light lochia. VSS. Pumping for infant in NICU. Repeat CBC pending. Admit papers and s&s of Pre-E and PPH reviewed over with pt. Pt states understanding.

## 2024-11-04 NOTE — PLAN OF CARE
VSS . Patient denies HA, dizziness, vision changes, and RUQ pain. Pt ambulating independently and voiding without difficulty. Patient safety maintained, side rails up, bed low and locked position. Pain  controlled with  pain medication. Fundus midline and firm with minimal lochia. Significant other at bedside and attentive to patient's needs; parents responding to infant cues and bonding appropriately. No additional needs at this time.

## 2024-11-04 NOTE — ANESTHESIA POSTPROCEDURE EVALUATION
Anesthesia Post Evaluation    Patient: Daphne Sullivan    Procedure(s) Performed: * No procedures listed *    Final Anesthesia Type: CSE      Patient location during evaluation: floor  Patient participation: Yes- Able to Participate  Level of consciousness: awake and alert  Post-procedure vital signs: reviewed and stable  Pain management: adequate  Airway patency: patent    PONV status at discharge: No PONV  Anesthetic complications: no      Cardiovascular status: stable  Respiratory status: unassisted, spontaneous ventilation and room air  Hydration status: euvolemic  Follow-up not needed.              Vitals Value Taken Time   /55 11/04/24 0011   Temp 36.6 °C (97.8 °F) 11/04/24 0011   Pulse 89 11/04/24 0011   Resp 18 11/04/24 0011   SpO2 96 % 11/04/24 0011         No case tracking events are documented in the log.      Pain/Jovi Score: Pain Rating Prior to Med Admin: 8 (11/4/2024  6:02 AM)  Pain Rating Post Med Admin: 1 (11/4/2024  1:08 AM)

## 2024-11-04 NOTE — PLAN OF CARE
Plan of care:  Parents will hold baby skin-to-skin as soon as possible; patient will double pump breasts using the Initiation pumping pattern c the most suction that is comfortable; will store, label, and transport EBM as instructed;  will care for the collection kit as instructed and demonstrated; will pump at the baby's bedside; will increase calories, fluids, and rest; will call for assistance prn.

## 2024-11-04 NOTE — LACTATION NOTE
11/04/24 1015   Maternal Assessment   Breast Shape Bilateral:;round   Breast Density Bilateral:;soft   Areola Bilateral:;elastic   Nipples Bilateral:;everted   Maternal Infant Feeding   Maternal Emotional State assist needed;relaxed   Equipment Type   Breast Pump Type double electric, hospital grade   Breast Pump Flange Type hard   Breast Pump Flange Size other (see comments)  (18 mm)   Breast Pumping   Breast Pumping Interventions frequent pumping encouraged   Breast Pumping double electric breast pump utilized     Visited patient in room, patient stated her last pumping session was at 2000.  Assisted patient to use the Symphony pump c the double collection kit using the initiation pumping pattern c the most suction that was comfortable, obtained a total of 4 ml colostrum.  Father correctly labeled the EBM and will transport it to NICU.  Demonstrated and provided education to father care of the collection kit, washed and air drying.  Provided education for use and care of the Symphony pump and double collection kit.   NICU Guide reviewed, father now tracking the pumping sessions and volume obtained.  Discouraged use of her friends' pumps. Patient to call insurance company to obtain her own personal use pump.  Encouraged to call for assistance prn.

## 2024-11-04 NOTE — PROGRESS NOTES
[facility]  Obstetrics  Postpartum Progress Note    Patient Name: Daphne Sullivan  MRN: [id number]  Admission Date: 2024  Hospital Length of Stay: 2 days  Attending Physician: Reji Rayo MD  Primary Care Provider: Indigo Meneses MD    Subjective:     Principal Problem: (normal spontaneous vaginal delivery)    Hospital Course:  24 Daphne Sullivan is a 34 y.o. female  at 39w2d with Estimated Date of Delivery: 24 based on t1 US who presents to Labor and Delivery accompanied by .   Reports regular uterine contractions since 0700. They are now 2-3 minutes apart and increasing in intensity and duration.  strong contractions, active fetal movement, No vaginal bleeding , No loss of fluid. Gbs NEG and desires NCB.      24: PPD1, normal involution, doing well, baby is in NICU. Considering d/c home today to be with family.     Interval History: PPD1    She is doing well this morning. She is tolerating a regular diet without nausea or vomiting. She is voiding spontaneously. She is ambulating. She has passed flatus, and has not a BM. Vaginal bleeding is mild. She denies fever or chills. Abdominal pain is mild and controlled with oral medications. She Is breastfeeding (pumping d/t baby in NICU). Considering discharge today because family is at her house. Lives nearby and will come back to visit with baby in NICU if she decides to go home.       Objective:     Vital Signs (Most Recent):  Temp: 97.9 °F (36.6 °C) (24)  Pulse: 76 (24)  Resp: 17 (24)  BP: (!) 101/55 (patient asymptomatic) (24)  SpO2: 97 % (24) Vital Signs (24h Range):  Temp:  [97.8 °F (36.6 °C)-98.7 °F (37.1 °C)] 97.9 °F (36.6 °C)  Pulse:  [] 76  Resp:  [16-20] 17  SpO2:  [96 %-100 %] 97 %  BP: (101-147)/() 101/55     Weight: 62.9 kg (138 lb 10.7 oz)  Body mass index is 25.36 kg/m².      Intake/Output Summary (Last 24 hours) at  2024 0945  Last data filed at 2024 0600  Gross per 24 hour   Intake 494.74 ml   Output 2105 ml   Net -1610.26 ml         Significant Labs:  Lab Results   Component Value Date    GROUPTRH O POS 2024    HEPBSAG Non-reactive 2024     Recent Labs   Lab 24  0629   HGB 11.2*   HCT 33.0*       I have personallly reviewed all pertinent lab results from the last 24 hours.    Physical Exam:   Constitutional: She is oriented to person, place, and time. She appears well-developed and well-nourished.    HENT:   Head: Normocephalic.    Eyes: Conjunctivae are normal.     Cardiovascular:  Normal rate.             Pulmonary/Chest: No respiratory distress.        Abdominal: There is no abdominal tenderness.     Genitourinary:    Vagina and uterus normal.             Musculoskeletal: Normal range of motion.       Neurological: She is alert and oriented to person, place, and time.    Skin: Skin is warm and dry.    Psychiatric: She has a normal mood and affect. Her behavior is normal. Judgment and thought content normal.       Review of Systems   Constitutional:  Negative for chills and fever.   Eyes:  Negative for visual disturbance.   Respiratory:  Negative for cough.    Cardiovascular:  Negative for chest pain.   Gastrointestinal:  Negative for nausea and vomiting.   Genitourinary:  Positive for vaginal bleeding.   Neurological:  Negative for seizures and headaches.   All other systems reviewed and are negative.    Assessment/Plan:     34 y.o. female  for:    *  (normal spontaneous vaginal delivery)  , routine PP care.    Gestational hypertension, third trimester  Few mild range blood pressures in labor 140s/90s. No S&S of pre eclampsia. CMP repeat ordered this morning.  1 week b/p check after delivery planned.    Obstetrical laceration  2nd degree laceration repaired. Luna med, ice pack discussed. Routine Luna-care.        Disposition: As patient meets milestones, will plan to discharge  tomorrow.    Geovanna Srinivasan CNM  Obstetrics  [facility]

## 2024-11-05 ENCOUNTER — PATIENT MESSAGE (OUTPATIENT)
Dept: LACTATION | Facility: CLINIC | Age: 34
End: 2024-11-05
Payer: COMMERCIAL

## 2024-11-05 VITALS
TEMPERATURE: 98 F | BODY MASS INDEX: 25.52 KG/M2 | HEART RATE: 111 BPM | DIASTOLIC BLOOD PRESSURE: 77 MMHG | HEIGHT: 62 IN | SYSTOLIC BLOOD PRESSURE: 116 MMHG | OXYGEN SATURATION: 100 % | WEIGHT: 138.69 LBS | RESPIRATION RATE: 18 BRPM

## 2024-11-05 PROBLEM — Z86.59 HISTORY OF ANXIETY: Status: ACTIVE | Noted: 2024-11-05

## 2024-11-05 PROCEDURE — 25000003 PHARM REV CODE 250: Performed by: ADVANCED PRACTICE MIDWIFE

## 2024-11-05 RX ORDER — ACETAMINOPHEN 325 MG/1
650 TABLET ORAL EVERY 6 HOURS PRN
Qty: 30 TABLET | Refills: 0 | Status: SHIPPED | OUTPATIENT
Start: 2024-11-05

## 2024-11-05 RX ORDER — IBUPROFEN 600 MG/1
600 TABLET ORAL EVERY 8 HOURS PRN
Qty: 30 TABLET | Refills: 0 | Status: SHIPPED | OUTPATIENT
Start: 2024-11-05

## 2024-11-05 RX ADMIN — PRENATAL VIT W/ FE FUMARATE-FA TAB 27-0.8 MG 1 TABLET: 27-0.8 TAB at 07:11

## 2024-11-05 RX ADMIN — DIPHENHYDRAMINE HYDROCHLORIDE 25 MG: 25 CAPSULE ORAL at 03:11

## 2024-11-05 RX ADMIN — ACETAMINOPHEN 650 MG: 325 TABLET, FILM COATED ORAL at 03:11

## 2024-11-05 RX ADMIN — ACETAMINOPHEN 650 MG: 325 TABLET, FILM COATED ORAL at 10:11

## 2024-11-05 RX ADMIN — DOCUSATE SODIUM 200 MG: 100 CAPSULE, LIQUID FILLED ORAL at 07:11

## 2024-11-05 RX ADMIN — IBUPROFEN 600 MG: 600 TABLET, FILM COATED ORAL at 05:11

## 2024-11-05 RX ADMIN — IBUPROFEN 600 MG: 600 TABLET, FILM COATED ORAL at 10:11

## 2024-11-05 NOTE — DISCHARGE SUMMARY
[facility]  Obstetrics  Discharge Summary      Patient Name: Daphne Sullivan  MRN: [id number]  Admission Date: 2024  Hospital Length of Stay: 3 days  Discharge Date and Time:  2024 1:38 PM  Attending Physician: Reji Rayo MD   Discharging Provider: Angelica Mistry CNM   Primary Care Provider: Indigo Meneses MD    HPI: Daphne Sullivan is a 34 y.o. female  at 39w2d with Estimated Date of Delivery: 24 based on t1 US who presents to Labor and Delivery accompanied by .   Reports regular uterine contractions since 0700. They are now 2-3 minutes apart and increasing in intensity and duration.  strong contractions, active fetal movement, No vaginal bleeding , No loss of fluid. Gbs NEG and desires NCB.      Last ate 1000pm a banana, last drank water at 1130 pm.      This pregnancy has been uncomplicated.    * No surgery found *     Hospital Course:   24 Daphne Sullivan is a 34 y.o. female  at 39w2d with Estimated Date of Delivery: 24 based on t1 US who presents to Labor and Delivery accompanied by .   Reports regular uterine contractions since 0700. They are now 2-3 minutes apart and increasing in intensity and duration.  strong contractions, active fetal movement, No vaginal bleeding , No loss of fluid. Gbs NEG and desires NCB.      24: PPD1, normal involution, doing well, baby is in NICU. Considering d/c home today to be with family.     24- PP Day #2, 34 year old  s/p  of live infant male at 39 + 5/7 weeks. 2nd degree perineal laceration. Infant to NICU after delivery for questionable seizure activity. Remains in NICU. Doing well.   Daphne is tolerating a regular diet without nausea or vomiting. VSS. Diagnosed with gestational HTN intrapartum. Normotensive for > 24 hours. No S&S of pre eclampsia. AST mildly elevated. Has CONNECTED MOM at home and will monitor blood pressure as well as f/u for 1 week b/p  check after discharge.  She is voiding spontaneously. She is ambulating without feeling dizzy or lightheaded. No Anemia. She has passed flatus, and has not a BM. Vaginal bleeding is mild. She denies fever or chills. Abdominal pain is mild and controlled with oral medications. She Is breastfeeding/pumping breast without S&S of mastitis or engorgement.  Known hx of Anxiety, no current meds. Feels she is doing well. Has great family support at home and will be able to visit infant in NICU frequently. Discussed PP depression/anxiety and plan 2 week mood check after discharge. D/C home today. Will discuss contraception at 6 weeks PP visit.     Gen: A&O x 4, NAD  CV: normal HR  Lungs: normal resp effort  Breasts: bilaterally soft, non-tender, nipples intact   Abdomen: soft, non-tender, uterus firm at U - 2 fb  Perineum: approximated, no edema.  Lochia: minimal rubra  Ext: bilaterally NO pedal edema, without signs of DVT      Final Active Diagnoses:    Diagnosis Date Noted POA    PRINCIPAL PROBLEM:   (normal spontaneous vaginal delivery) [O80] 2024 Not Applicable    History of anxiety [Z86.59] 2024 Not Applicable    Obstetrical laceration [O71.9] 2024 No    Gestational hypertension, third trimester [O13.3] 2024 No      Problems Resolved During this Admission:        Significant Diagnostic Studies: Labs: CBC   Recent Labs   Lab 24  0629   WBC 26.83*   HGB 11.2*   HCT 33.0*            Feeding Method: breast    Immunizations       Date Immunization Status Dose Route/Site Given by    24 184 MMR Incomplete 0.5 mL Subcutaneous/     24 184 Tdap Incomplete 0.5 mL Intramuscular/             Delivery:    Episiotomy: None   Lacerations: 2nd   Repair suture:     Repair # of packets: 1   Blood loss (ml):       Birth information:  YOB: 2026   Time of birth: 4:43 PM   Sex: male   Delivery type: Vaginal, Spontaneous   Gestational Age: 39w3d     Measurements    Weight:    Length:          Delivery Clinician: Delivery Providers    Delivering clinician: Angelica Mistry CNM   Provider Role    Shelly Manzo, RN Registered Nurse    Katerine Lewis, RN Registered Nurse    Romy Maurer RN Registered Nurse    Macy Dorsey,               Additional  information:  Forceps:    Vacuum:    Breech:    Observed anomalies      Living?:     Apgars    Living status: Living  Apgar Component Scores:  1 min.:  5 min.:  10 min.:  15 min.:  20 min.:    Skin color:   1  2      Heart rate:  1  2  2      Reflex irritability:   0  0      Muscle tone:   1  1      Respiratory effort:   1  2      Total:   5  7               Placenta: Delivered:       appearance  Pending Diagnostic Studies:       None            Discharged Condition: stable    Disposition: Home or Self Care    Follow Up:   Follow-up Information       [facility]. Schedule an appointment as soon as possible for a visit in 1 week(s).    Specialty: Obstetrics and Gynecology  Why: blood pressure check  Contact information:  [address]  [facility] [address] [phone]  Additional information:  Turn at Entrance 1 on [facility] and take elevators to Floor 2. Follow signs to Hospital. Take Hospital Elevators to Floor 4 for [address].             [facility]. Schedule an appointment as soon as possible for a visit in 2 week(s).    Specialty: Obstetrics and Gynecology  Why: for mood check  Contact information:  [address]  [facility] [id number] [phone]  Additional information:  Turn at Entrance 1 on [facility] in [facility] and take elevators to Floor 2. Follow signs to Hospital. Take Hospital Elevators to Floor 4 for [address].             [facility] Follow up in 6 week(s).     Specialty: Obstetrics and Gynecology  Why: for routine postpartum visit  Contact information:  [address]  [facility] [address] [phone]  Additional information:  Turn at Entrance 1 on [address] in [facility] and take elevators to Floor 2. Follow signs to Hospital. Take Hospital Elevators to Floor 4 for [address].                         Patient Instructions:      BREAST PUMP FOR HOME USE     Order Specific Question Answer Comments   Type of pump: Electric    Weight: 62.9 kg (138 lb 10.7 oz)    Length of need (1-99 months): 99      Diet Adult Regular     Lifting restrictions     Pelvic Rest     Notify your health care provider if you experience any of the following:  temperature >100.4     Notify your health care provider if you experience any of the following:  persistent nausea and vomiting or diarrhea     Notify your health care provider if you experience any of the following:  severe uncontrolled pain     Notify your health care provider if you experience any of the following:  redness, tenderness, or signs of infection (pain, swelling, redness, odor or green/yellow discharge around incision site)     Notify your health care provider if you experience any of the following:  difficulty breathing or increased cough     Notify your health care provider if you experience any of the following:  severe persistent headache     Notify your health care provider if you experience any of the following:  worsening rash     Notify your health care provider if you experience any of the following:  persistent dizziness, light-headedness, or visual disturbances     Notify your health care provider if you experience any of the following:  increased confusion or weakness     Medications:  Current Discharge Medication List        START taking these medications    Details   acetaminophen (TYLENOL) 325 MG tablet Take 2 tablets (650 mg total) by  mouth every 6 (six) hours as needed for Pain.  Qty: 30 tablet, Refills: 0      ibuprofen (ADVIL,MOTRIN) 600 MG tablet Take 1 tablet (600 mg total) by mouth every 8 (eight) hours as needed for Pain.  Qty: 30 tablet, Refills: 0           CONTINUE these medications which have NOT CHANGED    Details   hydrocortisone 2.5 % cream Apply topically 2 (two) times daily.  Qty: 28 g, Refills: 0    Associated Diagnoses: Hemorrhoids, unspecified hemorrhoid type      prenatal vit no.124/iron/folic (PRENATAL VITAMIN ORAL) Take 1 Dose by mouth Daily.           STOP taking these medications       LIDOcaine HCl-hydrocortison ac 3-1 % (7 gram) Kit Comments:   Reason for Stopping:             Follow Up/Patient Instructions:   1. Reviewed postpartum recommendations and precautions ~ encouraged to call for fever, severe or increased pain in head, chest, abdomen, perineum or legs; heavy bleeding, foul smelling lochia, signs of depression, or any other concern. Reviewed postpartum precautions r/t high blood pressure ~ encouraged to call for HA, vision changes, epigastric discomfort, or abnormal swelling.  2. Rx provided: Motrin and Tylenol.  3. Contraception: Undecidedwill f/u at postpartum visit. Encouraged abstinence and pelvic rest for healing until after postpartum check-up.   4. Encouraged to continue PNV while breastfeeding or at least for 6 weeks postpartum.   5. Car seat law will be reviewed with patient at discharge per protocol  6. RTO in 1 week for b/p check, 2 weeks for mood check then 6 weeks or sooner prn.  7. Discharge home today with written and verbal postpartum instructions and precautions.       Angelica Mistry CNM  Obstetrics  [facility]

## 2024-11-05 NOTE — PLAN OF CARE
Pt ambulating and voiding without difficulty. Patient safety maintained, side rails up x2, bed low and locked position.  Pain well controlled with PRN and scheduled pain medication. Fundus midline, firm, with light lochia. VSS. Pumping for infant in NICU.

## 2024-11-05 NOTE — PROGRESS NOTES
How to Care for Yourself After Delivery booklet reviewed. Pt verbalized understanding that she will check her BP at least once a day for the three days following discharge via Connected Mom. Pt stated that she will call/come in through the [facility] if she experiences any SS of HTN or for any severe range BP. Pt also verbalized understanding that she will follow up with the midwives in six weeks.

## 2024-11-05 NOTE — PLAN OF CARE
Pt to follow breast pumping discharge education. Pt encourage to pump 8 times in 24hrs. Discussed when to switch to maintain phase on the pump.Reviewed cleaning and sterilizing pump parts. Pt given resources to contact after discharge for breastfeeding support. Pt and fob verbalized understanding of education.

## 2024-11-06 ENCOUNTER — TELEPHONE (OUTPATIENT)
Dept: OBSTETRICS AND GYNECOLOGY | Facility: CLINIC | Age: 34
End: 2024-11-06
Payer: COMMERCIAL

## 2024-11-06 ENCOUNTER — PATIENT MESSAGE (OUTPATIENT)
Dept: OBSTETRICS AND GYNECOLOGY | Facility: OTHER | Age: 34
End: 2024-11-06
Payer: COMMERCIAL

## 2024-11-06 ENCOUNTER — LACTATION ENCOUNTER (OUTPATIENT)
Dept: INTENSIVE CARE | Facility: OTHER | Age: 34
End: 2024-11-06

## 2024-11-06 NOTE — LACTATION NOTE
This note was copied from a baby's chart.  Bedside contact with parents:  Mom reports pumping regularly~every 3hrs with increasing supply daily. She pumped at bedside and LC educated mom on hand expression after pump session, increasing this expressed milk volume to 9ml-praised mom. Dad present,supportive and helpful. Plan:   Latch assist tomorrow@0800 for First Oral Feeding, as mom does desire to Breastfeed.  Encouragement and support provided.

## 2024-11-06 NOTE — TELEPHONE ENCOUNTER
Spoke with Daphne (came by clinic after visiting infant in NICU) who had a question regarding right hand swelling.   CNM inspected hands and legs/ ankles, no swelling noted. No facial swelling noted.     Does report mild headache, she states is is likely a tension headache due to lack of sleep (baby in NICU) and history of chronic headaches.  Denies any changes in vision, RUQ pain lower extremity or facial swelling.    Recommended going home and resting, adequate hydration and Ibuprofen.   If headache does not resolve or develop any other symptoms (changes in vision, RUQ pain, swelling lower extremities or face) then reach back out immediately and/or report to [facility] for labs and evaluation.

## 2024-11-07 ENCOUNTER — LACTATION ENCOUNTER (OUTPATIENT)
Dept: INTENSIVE CARE | Facility: OTHER | Age: 34
End: 2024-11-07

## 2024-11-07 NOTE — LACTATION NOTE
"This note was copied from a baby's chart.  Bedside contact for Latch support:  1st latch attempt (cleared by RAAD Tidwell, provider today):  Using blanket roll, LC assisted with cross cradle positioning skin to skin. Mom expressed drops of ebm and Moody was awake with feeding cues/rooting at the breast. With minimal assistance he was able to latch deeply and effectively breast feed with rhythmic sucking/swallowing/breathing-self pacing nicely with frequent audible swallowing and milk in/around mouth-Wonderful breast feeding session~20min. LC praised mom and Moody; dad present, very supportive/helpful. Encouraged parents to be here as much as possible for more "on-demand breast feeding" as discussed with provider after this session. Encouragement and support provided; mom with notable increase in supply over night and this morning (day 4-discussed comfort/treatment measures for primary engorgement). Mom to call lactation for any needs.   1800 Bedside f/u with parents:  Parents report successful breast feeding. Mom to pump some, but plans to mostly directly breast feed on demand. Reviewed   Signs of Adequate Infant Intake: (from Day 6 onward)          You should feel long, rhythmic, pulling sucks and hear swallows throughout feeding          Your baby's lips should look wet at the end of the feeding          Your breasts should feel softer at the end of the feeding          Your baby should have 5-7 pale yellow/clear, heavy, urine diapers          Your baby should have 3-4 medium-large sized, yellow, seedy, watery stools          Your baby should be gaining weight at peds follow up.   Back to sleep, swaddling and how to know your baby is getting enough discussed. Parents to call with any needs.     "

## 2024-11-08 ENCOUNTER — LACTATION ENCOUNTER (OUTPATIENT)
Dept: INTENSIVE CARE | Facility: OTHER | Age: 34
End: 2024-11-08

## 2024-11-08 NOTE — LACTATION NOTE
This note was copied from a baby's chart.  NICU Lactation Discharge Note:    Latch assist: mom is now completely independent with breast feeding. Moody is able to latch deeply, have good sustained, rhythmic breastfeeding with audible swallowing and no signs of distress. Praised mom and infant.   Discussed importance of a deep latch, signs of a good latch, signs of milk transfer, and how to know if baby is getting enough.     Feeding plan for home: Under the guidance of the Pediatrician mother to continue transition to exclusive breast feeding as desires; encouraged mother to put baby to breast on demand when early hunger cues are observed 8 or more times in 24-hour period; if signs of an effective latch and active milk transfer are noted, mother to allow baby to nurse until content; mother to continue supplement of expressed breast milk (or formula) as needed until exclusive breast feeding is well established; mother to closely monitor for signs that baby is getting enough (hydration, calories) at breast AEB at least 5-6 heavy, wet diapers/day, 3-4 loose, yellow seedy stools/day, and once birth weight is regained by day 10-14, a continued weight gain of 5-7 ounces/week; mother to follow-up with the Pediatrician for weight checks and as scheduled/needed.    Completed NICU lactation discharge teaching with good understanding verbalized by mother.  Provided mother with written handouts to reinforce verbal instructions.  Encouraged mother to participate in a breast feeding support group to facilitate meeting her breast feeding goals, as well as outpatient lactation services.  Provided mother with list of lactation community resources as well as NICU lactation contact numbers.

## 2024-11-09 ENCOUNTER — LACTATION ENCOUNTER (OUTPATIENT)
Dept: INTENSIVE CARE | Facility: OTHER | Age: 34
End: 2024-11-09

## 2024-11-09 NOTE — LACTATION NOTE
This note was copied from a baby's chart.  Mother/Baby being followed by lactation.  Bedside contact prior to discharge. Parents had questions regarding breastfeeding and pumping. Discussed. Reviewed sings of adequate intake. Encouraged to keep track of intake and output for first few weeks until infant shows consistent weight gain of 5-7 oz/week.  Signs of Adequate Infant Intake:           You should feel long, rhythmic, pulling sucks and hear swallows throughout feeding          Your baby's lips should look wet at the end of the feeding          Your breasts should feel softer at the end of the feeding          Your baby should have 5-7 pale yellow/clear, heavy, urine diapers          Your baby should have 3-4 medium-large sized, yellow, seedy, watery stools          Your baby should be gaining weight   Ghazala Thomas, BSN, RNC, IBCLC

## 2024-11-10 ENCOUNTER — TELEPHONE (OUTPATIENT)
Dept: OBSTETRICS AND GYNECOLOGY | Facility: HOSPITAL | Age: 34
End: 2024-11-10
Payer: COMMERCIAL

## 2024-11-11 ENCOUNTER — OFFICE VISIT (OUTPATIENT)
Dept: OBSTETRICS AND GYNECOLOGY | Facility: CLINIC | Age: 34
End: 2024-11-11
Payer: COMMERCIAL

## 2024-11-11 DIAGNOSIS — R31.9 URINARY TRACT INFECTION WITH HEMATURIA, SITE UNSPECIFIED: ICD-10-CM

## 2024-11-11 DIAGNOSIS — R30.0 DYSURIA: Primary | ICD-10-CM

## 2024-11-11 DIAGNOSIS — N39.0 URINARY TRACT INFECTION WITH HEMATURIA, SITE UNSPECIFIED: ICD-10-CM

## 2024-11-11 PROCEDURE — 87086 URINE CULTURE/COLONY COUNT: CPT | Performed by: ADVANCED PRACTICE MIDWIFE

## 2024-11-11 PROCEDURE — 99999 PR PBB SHADOW E&M-EST. PATIENT-LVL I: CPT | Mod: PBBFAC,,, | Performed by: ADVANCED PRACTICE MIDWIFE

## 2024-11-11 RX ORDER — AMOXICILLIN AND CLAVULANATE POTASSIUM 875; 125 MG/1; MG/1
1 TABLET, FILM COATED ORAL EVERY 12 HOURS
Qty: 10 TABLET | Refills: 0 | Status: SHIPPED | OUTPATIENT
Start: 2024-11-11 | End: 2024-11-16

## 2024-11-11 NOTE — TELEPHONE ENCOUNTER
"Pt gave birth 7 days ago and had elevated Bps during labor so has been monitoring Bps at home since discharge. She had an elevated BP today at 1237 of 147/83. The repeat was 118/81. She states that she had drunk a caffeine beverage prior to the elevated BP. She denies any s/s of pre-e, states that she is "doing well". Advised she  can repeat one BP tomorrow as her BP check w/in 7 days and to report any of the warning s/s of pre-eclampsia.   "

## 2024-11-11 NOTE — PROGRESS NOTES
Subjective   33 yo now  s/p  one week ago complaining of dysuria and pelvic pressure. She reports that she has been visiting baby in NICU. She also reports that she feels some urethral discomfort. She denies fever, myalgia or other concerns. She is pumping at home and latching baby in NICU.    Objective   UA: + large blood  Nitrites equivocal  +WBC      UC pending    Assessment   Presume UTI    Plan   Empiric treatment for UTI  Reviewed warning signs with patient   F/U one week for virtual visit f/u

## 2024-11-13 ENCOUNTER — PATIENT MESSAGE (OUTPATIENT)
Facility: CLINIC | Age: 34
End: 2024-11-13
Payer: COMMERCIAL

## 2024-11-13 LAB — BACTERIA UR CULT: NO GROWTH

## 2024-11-14 ENCOUNTER — PATIENT MESSAGE (OUTPATIENT)
Dept: OBSTETRICS AND GYNECOLOGY | Facility: CLINIC | Age: 34
End: 2024-11-14
Payer: COMMERCIAL

## 2024-11-20 ENCOUNTER — OFFICE VISIT (OUTPATIENT)
Dept: OBSTETRICS AND GYNECOLOGY | Facility: CLINIC | Age: 34
End: 2024-11-20
Payer: COMMERCIAL

## 2024-11-20 DIAGNOSIS — Z86.59 HISTORY OF ANXIETY: ICD-10-CM

## 2024-11-20 NOTE — PROGRESS NOTES
Postpartum MOOD evaluation:    Daphne Sullivan is a 34 y.o. female . Mood check. Location is [place]. Agrees to virtual visit.  She is 2 weeks postpartum following a spontaneous vaginal delivery, of a male infant.  Apgar of 5/7. Appointment is for mood check today. Infant to NICU after delivery for questionable seizure activity . Home now with parent's and doing well. Some issues with weight gain. She has been breastfeeding and is now pumping as well. No issues with mastitis or discomfort. Spouse, Refugio, very supportive. She has a history of anxiety in past. Feels she is doing well.    Significant Risk Factors for PPD:    OB History    Para Term  AB Living   1         0   SAB IAB Ectopic Multiple Live Births           0      # Outcome Date GA Lbr Carlos/2nd Weight Sex Type Anes PTL Lv   1                 Postpartum course has been uncomplicated.    Bleeding thin lochia. Bowel/ bladder function is normal. Mild urethral twinging/discomfort. Recent normal urine cx. Not acute discomfort. No fever/chills or body aches. No vaginal odor/discomfort. Daphne did have an epidural and bay cath.    ROS:  GENERAL: No fever, chills, fatigability.  VULVAR: No pain, no lesions and no itching.   VAGINAL: No relaxation, no itching, no discharge, no abnormal bleeding and no lesions. + urethral discomfort/mild. No bowel/bladder issues.  ABDOMEN: No abdominal pain. Denies nausea. Denies vomiting. No diarrhea. No constipation  BREAST: Denies pain. No lumps. No discharge.  URINARY: No incontinence, no nocturia, no frequency and no dysuria.  CARDIOVASCULAR: No chest pain. No shortness of breath. No leg cramps.  NEUROLOGICAL: No headaches. No vision changes.    Past Medical History:   Diagnosis Date    Anxiety disorder      Past Surgical History:   Procedure Laterality Date    NO PAST SURGERIES       Review of patient's allergies indicates:  No Known Allergies    Current Outpatient Medications:      acetaminophen (TYLENOL) 325 MG tablet, Take 2 tablets (650 mg total) by mouth every 6 (six) hours as needed for Pain., Disp: 30 tablet, Rfl: 0    hydrocortisone 2.5 % cream, Apply topically 2 (two) times daily., Disp: 28 g, Rfl: 0    ibuprofen (ADVIL,MOTRIN) 600 MG tablet, Take 1 tablet (600 mg total) by mouth every 8 (eight) hours as needed for Pain., Disp: 30 tablet, Rfl: 0    prenatal vit no.124/iron/folic (PRENATAL VITAMIN ORAL), Take 1 Dose by mouth Daily., Disp: , Rfl:       There were no vitals filed for this visit.    PHYSICAL EXAM:  Constitutional/Gen: NAD, appears stated age, well groomed  Neurologic: A&O x 4, non-focal, cranial nerves 2-12 grossly intact  Psych: affect appropriate and without signs of mood, thought or memory difficulty appreciated    Diagnoses and all orders for this visit:    Postpartum care and examination    Lactating mother    History of anxiety        At risk for PPD:   Patient denies homicidal or suicidal ideations. Counseling encouraged and references offered.   Educated about adjunct treatments including but not limited to avoidance of stimulants including caffeine, participation in regular exercise, yoga, meditation, and breathing exercises and daily exposure to sun/light therapy. Vitamin B6 daily may be helpful in some cases and safe to take on regular basis, and/or B-complex.  Also discussed setting aside at least 1hr/week for which she has to herself (without childcare or other responsibilities) for which she can participate in self-care.     PP Community resources/ counseling references discussed.   [facility] support groups Encouraged:    - Snuggles and Struggles @ [facility]   - Beyond the Blues @ [employer]   - Cafe Au Lait [facility]     Reinforced good po hydration/flushing of system. Continue PRN Motrin/Tylenol.   Discussed use deyvi spray, Dermoplast for urethral discomfort and monitoring for S&S of infection.   Routine follow  up in 6 for routine postpartum appt, sooner if needed.    Angelica Mistry CNM, MSN  11/20/2024  2:09 PM

## 2024-11-27 RX ORDER — DICLOXACILLIN SODIUM 250 MG/1
500 CAPSULE ORAL 4 TIMES DAILY
Qty: 80 CAPSULE | Refills: 0 | Status: SHIPPED | OUTPATIENT
Start: 2024-11-27 | End: 2024-11-27 | Stop reason: SDUPTHER

## 2024-11-27 RX ORDER — DICLOXACILLIN SODIUM 250 MG/1
500 CAPSULE ORAL 4 TIMES DAILY
Qty: 80 CAPSULE | Refills: 0 | Status: SHIPPED | OUTPATIENT
Start: 2024-11-27

## 2024-12-04 ENCOUNTER — PATIENT MESSAGE (OUTPATIENT)
Facility: CLINIC | Age: 34
End: 2024-12-04
Payer: COMMERCIAL

## 2024-12-18 ENCOUNTER — E-VISIT (OUTPATIENT)
Dept: INTERNAL MEDICINE | Facility: CLINIC | Age: 34
End: 2024-12-18
Payer: COMMERCIAL

## 2024-12-18 DIAGNOSIS — M79.673 PAIN OF FOOT, UNSPECIFIED LATERALITY: Primary | ICD-10-CM

## 2024-12-18 NOTE — PROGRESS NOTES
Patient ID: Daphne Sullivan is a 34 y.o. female.    Chief Complaint: General Illness (Entered automatically based on patient selection in [email or web address].)    The patient initiated a request through [email or web address] on 12/18/2024 for evaluation and management with a chief complaint of General Illness (Entered automatically based on patient selection in [email or web address].)     I evaluated the questionnaire submission on 12/18/2024  4:34 PM  Referral to podiatry placed. -MARBELLA MOODY       [facility] Evisit [facility]-Muscle,Back,Joint    12/18/2024 11:20 AM CST - Filed by Patient   What do you need help with? Foot Problem   Do you agree to participate in an E-Visit? Yes   If you have any of the following symptoms, please present to your local emergency room or call 911:  I acknowledge   Do you have any of the following pregnancy-related conditions? Breast feeding   What is the main issue you would like addressed today? Pain in my foot, top - referral to foot doc   Do you have any of the following: New or worsening joint pain   Provide any additional information you feel is important.    Please attach any relevant images or files    Are you able to take your vital signs? Yes   Systolic Blood Pressure: 105   Diastolic Blood Pressure: 60   Weight: 120   Height: 5   Pulse:    Temperature:    Respiration rate:    Pulse Oxygen:    Do you have a history of any of the following: None   What joint(s) are you having a problem with? Foot   Describe the exact location of the pain. Above the big toe, top of foot maybe extensor tendon   What activites make your joint pain worse? Climbing stairs;  Lying down;  Running;  Standing for long periods of time;  Walking   What have you used to help with your joint pain? Anti-inflammatory (ibuprofen, Aleve, Advil)   Has there been any change in your joint pain based on the treatment you have tried? Unchanged   Do you have any history of joint injuries, surgeries, or other medical conditions that may be related to  your joint pain? No         Encounter Diagnosis   Name Primary?    Pain of foot, unspecified laterality Yes        Orders Placed This Encounter   Procedures    Ambulatory referral/consult to Podiatry     Standing Status:   Future     Standing Expiration Date:   1/18/2026     Referral Priority:   Routine     Referral Type:   Consultation     Referral Reason:   Specialty Services Required     Requested Specialty:   Podiatry     Number of Visits Requested:   1            No follow-ups on file.      E-Visit Time Tracking:  10 mins

## 2025-01-21 ENCOUNTER — PATIENT MESSAGE (OUTPATIENT)
Dept: INTERNAL MEDICINE | Facility: CLINIC | Age: 35
End: 2025-01-21
Payer: COMMERCIAL

## 2025-02-04 ENCOUNTER — POSTPARTUM VISIT (OUTPATIENT)
Dept: OBSTETRICS AND GYNECOLOGY | Facility: CLINIC | Age: 35
End: 2025-02-04
Payer: COMMERCIAL

## 2025-02-04 VITALS
WEIGHT: 120.25 LBS | HEART RATE: 62 BPM | DIASTOLIC BLOOD PRESSURE: 56 MMHG | HEIGHT: 62 IN | SYSTOLIC BLOOD PRESSURE: 104 MMHG | BODY MASS INDEX: 22.13 KG/M2

## 2025-02-04 DIAGNOSIS — Z87.59 HISTORY OF GESTATIONAL HYPERTENSION: ICD-10-CM

## 2025-02-04 DIAGNOSIS — Z30.9 ENCOUNTER FOR CONTRACEPTIVE MANAGEMENT, UNSPECIFIED TYPE: Primary | ICD-10-CM

## 2025-02-04 DIAGNOSIS — Z86.59 HISTORY OF ANXIETY: ICD-10-CM

## 2025-02-04 PROBLEM — O44.40 LOW-LYING PLACENTA: Status: RESOLVED | Noted: 2024-07-16 | Resolved: 2025-02-04

## 2025-02-04 PROBLEM — Z00.00 WELL WOMAN EXAM (NO GYNECOLOGICAL EXAM): Status: ACTIVE | Noted: 2025-02-04

## 2025-02-04 PROCEDURE — 99395 PREV VISIT EST AGE 18-39: CPT | Mod: S$GLB,,, | Performed by: ADVANCED PRACTICE MIDWIFE

## 2025-02-04 PROCEDURE — 99999 PR PBB SHADOW E&M-EST. PATIENT-LVL III: CPT | Mod: PBBFAC,,, | Performed by: ADVANCED PRACTICE MIDWIFE

## 2025-02-04 RX ORDER — NORETHINDRONE 0.35 MG/1
1 TABLET ORAL DAILY
Qty: 30 TABLET | Refills: 11 | Status: SHIPPED | OUTPATIENT
Start: 2025-02-04 | End: 2026-02-04

## 2025-02-04 NOTE — PROGRESS NOTES
CC: Post-partum follow-up    Daphne Sullivan is a 34 y.o. female  who presents for gyn well woman visit. She is S/P normal spontaneous vaginal delivery at 39+ 3/7 weeks 13 weeks ago.  Live infant born over male. 2nd degree laceration. Apgar of 5/7. Infant to NICU x 6 with possible seizure activity after delivery. Doing great. Meeting all milestones.  Daphne has begun to exercise and is doing well.    Delivery Date: 11/3/24  Delivery provider: Angelica Mistry CNM  Gender: male  Breast Feeding: breast and pumping  Bowel/bladder function: normal  Depression: No. EPDS: 4  B[place]iss 2 weeks ago with some vaginal discomfort interval at site of laceration repair. Condom use. No menses since cessation of lochia.  Contraception: oral contraceptives (estrogen/progesterone)  Last pap:  wnl    Pregnancy was complicated by:    Patient Active Problem List   Diagnosis    Anal fissure    Fissure in ano    Social anxiety disorder    Chronic right-sided low back pain without sciatica    Chronic tension-type headache, not intractable    Episodic tension-type headache, not intractable    Chronic neck and back pain    Low-lying placenta RESOLVED     (spontaneous vaginal delivery)    Obstetrical laceration    Gestational hypertension, third trimester    History of anxiety    Lactating mother    History of gestational hypertension    Well woman exam (no gynecological exam)       Postpartum period has been complicated by:       Current Outpatient Medications:     acetaminophen (TYLENOL) 325 MG tablet, Take 2 tablets (650 mg total) by mouth every 6 (six) hours as needed for Pain., Disp: 30 tablet, Rfl: 0    dicloxacillin (DYNAPEN) 250 MG capsule, Take 2 capsules (500 mg total) by mouth 4 (four) times daily., Disp: 80 capsule, Rfl: 0    hydrocortisone 2.5 % cream, Apply topically 2 (two) times daily., Disp: 28 g, Rfl: 0    ibuprofen (ADVIL,MOTRIN) 600 MG tablet, Take 1 tablet (600 mg total) by mouth every 8 (eight)  hours as needed for Pain., Disp: 30 tablet, Rfl: 0    prenatal vit no.124/iron/folic (PRENATAL VITAMIN ORAL), Take 1 Dose by mouth Daily., Disp: , Rfl:      ROS:  GENERAL: No fever, chills, fatigability.  VULVAR: No pain, no lesions and no itching.  VAGINAL: No relaxation, no itching, no discharge, no abnormal bleeding and no lesions.  ABDOMEN: No abdominal pain. Denies nausea. Denies vomiting. No diarrhea. No constipation  BREAST: Denies pain. No lumps.  URINARY: No incontinence, no nocturia, no frequency and no dysuria.  CARDIOVASCULAR: No chest pain. No shortness of breath. No leg cramps.  NEUROLOGICAL: No headaches. No vision changes.      PHYSICAL EXAM:  LMP 01/27/2024    General:  Well-developed, well-nourished female in no acute distress  HEENT:  Normocephalic, atraumatic, extraocular muscles intact  Breasts:  Nontender, no masses, bilaterally symmetric  Abdomen:  Soft, nontender, nondistended, fundus firm and below umbilicus  Extremities:  Warm, dry, no clubbing/cyanosis/edema  Neurologic:  Cranial nerves 2-12 grossly intact  Dermatologic:  No rashes/lesions/bruising  Pelvic exam: normal external genitalia, vulva, vagina, cervix, uterus and adnexa.  Mild discomfort with bimanual exam. Laceration well healed, approximated and without S&S of infection.    IMP:  Status post normal spontaneous vaginal delivery.    PLAN:  Doing well.  Depression precautions discussed.  Method of contraception discussed and patient has decided, progestin only OCPs.   RX [place] to pharmacy and discussed. She will contact us with cessation of breast feeding/pumping for method change.   Has pelvic floor therapy appointment.   Pregnancy spacing discussed.  Patient expressed understanding.  Return for annual exam.

## 2025-02-25 ENCOUNTER — LAB VISIT (OUTPATIENT)
Dept: LAB | Facility: HOSPITAL | Age: 35
End: 2025-02-25
Payer: COMMERCIAL

## 2025-02-25 ENCOUNTER — OFFICE VISIT (OUTPATIENT)
Dept: INTERNAL MEDICINE | Facility: CLINIC | Age: 35
End: 2025-02-25
Payer: COMMERCIAL

## 2025-02-25 VITALS
WEIGHT: 119.5 LBS | HEART RATE: 54 BPM | SYSTOLIC BLOOD PRESSURE: 112 MMHG | DIASTOLIC BLOOD PRESSURE: 74 MMHG | BODY MASS INDEX: 21.99 KG/M2 | HEIGHT: 62 IN | OXYGEN SATURATION: 98 %

## 2025-02-25 DIAGNOSIS — Z00.00 ANNUAL PHYSICAL EXAM: ICD-10-CM

## 2025-02-25 DIAGNOSIS — M79.671 HEEL PAIN, BILATERAL: ICD-10-CM

## 2025-02-25 DIAGNOSIS — M79.672 HEEL PAIN, BILATERAL: ICD-10-CM

## 2025-02-25 DIAGNOSIS — M54.50 CHRONIC BILATERAL LOW BACK PAIN WITHOUT SCIATICA: ICD-10-CM

## 2025-02-25 DIAGNOSIS — G89.29 CHRONIC BILATERAL LOW BACK PAIN WITHOUT SCIATICA: ICD-10-CM

## 2025-02-25 DIAGNOSIS — Z00.00 ANNUAL PHYSICAL EXAM: Primary | ICD-10-CM

## 2025-02-25 LAB
ALBUMIN SERPL BCP-MCNC: 4.1 G/DL (ref 3.5–5.2)
ALP SERPL-CCNC: 74 U/L (ref 40–150)
ALT SERPL W/O P-5'-P-CCNC: 14 U/L (ref 10–44)
ANION GAP SERPL CALC-SCNC: 7 MMOL/L (ref 8–16)
AST SERPL-CCNC: 38 U/L (ref 10–40)
BASOPHILS # BLD AUTO: 0.04 K/UL (ref 0–0.2)
BASOPHILS NFR BLD: 0.7 % (ref 0–1.9)
BILIRUB SERPL-MCNC: 1.7 MG/DL (ref 0.1–1)
BUN SERPL-MCNC: 15 MG/DL (ref 6–20)
CALCIUM SERPL-MCNC: 9.1 MG/DL (ref 8.7–10.5)
CHLORIDE SERPL-SCNC: 108 MMOL/L (ref 95–110)
CO2 SERPL-SCNC: 26 MMOL/L (ref 23–29)
CREAT SERPL-MCNC: 0.7 MG/DL (ref 0.5–1.4)
DIFFERENTIAL METHOD BLD: NORMAL
EOSINOPHIL # BLD AUTO: 0.1 K/UL (ref 0–0.5)
EOSINOPHIL NFR BLD: 1.9 % (ref 0–8)
ERYTHROCYTE [DISTWIDTH] IN BLOOD BY AUTOMATED COUNT: 12.2 % (ref 11.5–14.5)
EST. GFR  (NO RACE VARIABLE): >60 ML/MIN/1.73 M^2
ESTIMATED AVG GLUCOSE: 103 MG/DL (ref 68–131)
GLUCOSE SERPL-MCNC: 91 MG/DL (ref 70–110)
HBA1C MFR BLD: 5.2 % (ref 4–5.6)
HCT VFR BLD AUTO: 41.6 % (ref 37–48.5)
HGB BLD-MCNC: 13.4 G/DL (ref 12–16)
IMM GRANULOCYTES # BLD AUTO: 0.01 K/UL (ref 0–0.04)
IMM GRANULOCYTES NFR BLD AUTO: 0.2 % (ref 0–0.5)
LYMPHOCYTES # BLD AUTO: 2.3 K/UL (ref 1–4.8)
LYMPHOCYTES NFR BLD: 39.1 % (ref 18–48)
MCH RBC QN AUTO: 30.9 PG (ref 27–31)
MCHC RBC AUTO-ENTMCNC: 32.2 G/DL (ref 32–36)
MCV RBC AUTO: 96 FL (ref 82–98)
MONOCYTES # BLD AUTO: 0.6 K/UL (ref 0.3–1)
MONOCYTES NFR BLD: 10.8 % (ref 4–15)
NEUTROPHILS # BLD AUTO: 2.8 K/UL (ref 1.8–7.7)
NEUTROPHILS NFR BLD: 47.3 % (ref 38–73)
NRBC BLD-RTO: 0 /100 WBC
PLATELET # BLD AUTO: 283 K/UL (ref 150–450)
PMV BLD AUTO: 10 FL (ref 9.2–12.9)
POTASSIUM SERPL-SCNC: 4.3 MMOL/L (ref 3.5–5.1)
PROT SERPL-MCNC: 7.4 G/DL (ref 6–8.4)
RBC # BLD AUTO: 4.34 M/UL (ref 4–5.4)
SODIUM SERPL-SCNC: 141 MMOL/L (ref 136–145)
TSH SERPL DL<=0.005 MIU/L-ACNC: 1.24 UIU/ML (ref 0.4–4)
WBC # BLD AUTO: 5.86 K/UL (ref 3.9–12.7)

## 2025-02-25 PROCEDURE — 80053 COMPREHEN METABOLIC PANEL: CPT | Performed by: PHYSICIAN ASSISTANT

## 2025-02-25 PROCEDURE — 99395 PREV VISIT EST AGE 18-39: CPT | Mod: S$GLB,,, | Performed by: PHYSICIAN ASSISTANT

## 2025-02-25 PROCEDURE — 85025 COMPLETE CBC W/AUTO DIFF WBC: CPT | Performed by: PHYSICIAN ASSISTANT

## 2025-02-25 PROCEDURE — 99999 PR PBB SHADOW E&M-EST. PATIENT-LVL IV: CPT | Mod: PBBFAC,,, | Performed by: PHYSICIAN ASSISTANT

## 2025-02-25 PROCEDURE — 84443 ASSAY THYROID STIM HORMONE: CPT | Performed by: PHYSICIAN ASSISTANT

## 2025-02-25 PROCEDURE — 83036 HEMOGLOBIN GLYCOSYLATED A1C: CPT | Performed by: PHYSICIAN ASSISTANT

## 2025-02-25 PROCEDURE — 36415 COLL VENOUS BLD VENIPUNCTURE: CPT | Performed by: PHYSICIAN ASSISTANT

## 2025-02-25 NOTE — PROGRESS NOTES
Internal Medicine Annual Exam       CHIEF COMPLAINT     The patient, Daphne Sullivan, who is a 34 y.o. female presents for an annual exam.    HPI     PCP is Indigo Meneses MD, patient is known to me. Pt complains of bilateral hand numbness and tingling which occurs with holding her phone. Also reports bilateral thumb/wrist pain when picking up her  (3 months PP). Pt states the pain subsides after putting her child down or moves positions. Pt denies decreased strength or ROM. Pt states the numbness can be worse in the morning when she wakes up. Pt also reports numbness in her heel. She was referred to podiatry about 3 months ago but at that time her foot pain resolved before she could be seen. She would like a renewed referral.         2025    11:10 AM 10/29/2024    10:07 AM 10/22/2024     2:27 PM 10/15/2024    10:38 AM 2024    11:26 AM 9/10/2024    11:52 AM 2024     9:56 AM   Depression Patient Health Questionnaire   Over the last two weeks how often have you been bothered by little interest or pleasure in doing things Not at all Not at all Not at all Not at all Not at all Not at all Not at all   Over the last two weeks how often have you been bothered by feeling down, depressed or hopeless Not at all Not at all Not at all Not at all Not at all Not at all Not at all   PHQ-2 Total Score 0 0 0 0 0 0 0         Health Maintenance:  COVID politely declined  Pt will consider flu vaccination  Pap smear UTD  Alcohol 3 drinks /wk  No smoking   No illicit drugs     Past Medical History:  Past Medical History:   Diagnosis Date    Anxiety disorder        Past Surgical History:   Procedure Laterality Date    NO PAST SURGERIES          Family History   Problem Relation Name Age of Onset    Heart disease Father  67        CAD--MI    Breast cancer Neg Hx      Cancer Neg Hx      Colon cancer Neg Hx      Diabetes Neg Hx      Eclampsia Neg Hx      Ovarian cancer Neg Hx       labor Neg Hx       Hypertension Neg Hx      Miscarriages / Stillbirths Neg Hx      Stroke Neg Hx          Social History[1]     Tobacco Use History[2]     Allergies as of 02/25/2025    (No Known Allergies)          Home Medications:  Prior to Admission medications    Medication Sig Start Date End Date Taking? Authorizing Provider   acetaminophen (TYLENOL) 325 MG tablet Take 2 tablets (650 mg total) by mouth every 6 (six) hours as needed for Pain.  Patient not taking: Reported on 2/25/2025 11/5/24   Angelica Mistry CNM   dicloxacillin (DYNAPEN) 250 MG capsule Take 2 capsules (500 mg total) by mouth 4 (four) times daily.  Patient not taking: Reported on 2/25/2025 11/27/24   Angelica Mistry CNM   hydrocortisone 2.5 % cream Apply topically 2 (two) times daily.  Patient not taking: Reported on 2/25/2025 8/26/24   Jennifer Dan CNM   ibuprofen (ADVIL,MOTRIN) 600 MG tablet Take 1 tablet (600 mg total) by mouth every 8 (eight) hours as needed for Pain.  Patient not taking: Reported on 2/25/2025 11/5/24   Angelica Mistry CNM   norethindrone (MICRONOR) 0.35 mg tablet Take 1 tablet (0.35 mg total) by mouth once daily.  Patient not taking: Reported on 2/25/2025 2/4/25 2/4/26  Angelica iMstry CNM   prenatal vit no.124/iron/folic (PRENATAL VITAMIN ORAL) Take 1 Dose by mouth Daily.  Patient not taking: Reported on 2/25/2025    Provider, Historical       Review of Systems:  Review of Systems   Constitutional:  Negative for chills, diaphoresis, fatigue and fever.   HENT:  Negative for congestion, ear discharge, ear pain, nosebleeds, postnasal drip, rhinorrhea, sinus pressure, sinus pain, sore throat and voice change.    Eyes:  Negative for photophobia and visual disturbance.   Respiratory:  Negative for cough, chest tightness, shortness of breath, wheezing and stridor.    Cardiovascular:  Negative for chest pain and palpitations.   Gastrointestinal:  Negative for abdominal distention, abdominal pain, constipation,  "diarrhea, nausea and vomiting.   Genitourinary:  Negative for decreased urine volume, dysuria, frequency and urgency.   Musculoskeletal:  Negative for arthralgias and myalgias.   Skin:  Negative for color change and pallor.   Neurological:  Negative for dizziness, syncope, weakness, light-headedness and headaches.   Hematological:  Negative for adenopathy.   Psychiatric/Behavioral:  Negative for behavioral problems. The patient is not nervous/anxious.      Health Maintainence:   Immunizations:  Health Maintenance         Date Due Completion Date    Influenza Vaccine (1) Never done ---    COVID-19 Vaccine (3 - 2024-25 season) 09/01/2024 12/15/2021    Cervical Cancer Screening 10/19/2026 10/19/2023    TETANUS VACCINE 09/25/2034 9/25/2024    RSV Vaccine (Age 60+ and Pregnant patients) (1 - 1-dose 75+ series) 08/11/2063 ---             PHYSICAL EXAM     /74 (BP Location: Left arm, Patient Position: Sitting)   Pulse (!) 54   Ht 5' 2" (1.575 m)   Wt 54.2 kg (119 lb 7.8 oz)   SpO2 98%   Breastfeeding Yes   BMI 21.85 kg/m²  Body mass index is 21.85 kg/m².    Physical Exam  Vitals and nursing note reviewed.   Constitutional:       General: She is not in acute distress.     Appearance: Normal appearance. She is normal weight. She is not ill-appearing, toxic-appearing or diaphoretic.      Comments: Pt is a well-appearing female in NAD   Ambulates with ease and speaks clearly in full sentences     HENT:      Head: Normocephalic and atraumatic.      Right Ear: External ear normal.      Left Ear: External ear normal.      Nose: Nose normal. No congestion or rhinorrhea.   Eyes:      General:         Right eye: No discharge.         Left eye: No discharge.      Extraocular Movements: Extraocular movements intact.      Conjunctiva/sclera: Conjunctivae normal.      Pupils: Pupils are equal, round, and reactive to light.   Cardiovascular:      Rate and Rhythm: Normal rate and regular rhythm.      Pulses: Normal pulses.     "  Heart sounds: Normal heart sounds. No murmur heard.     No friction rub. No gallop.   Pulmonary:      Effort: Pulmonary effort is normal. No respiratory distress.      Breath sounds: Normal breath sounds. No stridor. No wheezing, rhonchi or rales.   Abdominal:      General: Abdomen is flat. There is no distension.   Musculoskeletal:         General: Normal range of motion.      Cervical back: Normal range of motion.   Skin:     General: Skin is warm.      Capillary Refill: Capillary refill takes less than 2 seconds.      Coloration: Skin is not jaundiced.   Neurological:      General: No focal deficit present.      Mental Status: She is alert and oriented to person, place, and time.   Psychiatric:         Mood and Affect: Mood normal.         Behavior: Behavior normal.         Thought Content: Thought content normal.       LABS     Lab Results   Component Value Date    HGBA1C 5.1 10/10/2023     CMP  Sodium   Date Value Ref Range Status   11/04/2024 137 136 - 145 mmol/L Final     Potassium   Date Value Ref Range Status   11/04/2024 4.3 3.5 - 5.1 mmol/L Final     Chloride   Date Value Ref Range Status   11/04/2024 112 (H) 95 - 110 mmol/L Final     CO2   Date Value Ref Range Status   11/04/2024 18 (L) 23 - 29 mmol/L Final     Glucose   Date Value Ref Range Status   11/04/2024 75 70 - 110 mg/dL Final     BUN   Date Value Ref Range Status   11/04/2024 12 6 - 20 mg/dL Final     Creatinine   Date Value Ref Range Status   11/04/2024 1.0 0.5 - 1.4 mg/dL Final     Calcium   Date Value Ref Range Status   11/04/2024 8.1 (L) 8.7 - 10.5 mg/dL Final     Total Protein   Date Value Ref Range Status   11/04/2024 5.1 (L) 6.0 - 8.4 g/dL Final     Albumin   Date Value Ref Range Status   11/04/2024 2.1 (L) 3.5 - 5.2 g/dL Final     Total Bilirubin   Date Value Ref Range Status   11/04/2024 0.6 0.1 - 1.0 mg/dL Final     Comment:     For infants and newborns, interpretation of results should be based  on gestational age, weight and in  agreement with clinical  observations.    Premature Infant recommended reference ranges:  Up to 24 hours.............<8.0 mg/dL  Up to 48 hours............<12.0 mg/dL  3-5 days..................<15.0 mg/dL  6-29 days.................<15.0 mg/dL       Alkaline Phosphatase   Date Value Ref Range Status   11/04/2024 212 (H) 40 - 150 U/L Final     AST   Date Value Ref Range Status   11/04/2024 66 (H) 10 - 40 U/L Final     ALT   Date Value Ref Range Status   11/04/2024 33 10 - 44 U/L Final     Anion Gap   Date Value Ref Range Status   11/04/2024 7 (L) 8 - 16 mmol/L Final     eGFR if    Date Value Ref Range Status   01/06/2011 >60 >60 mL/min Final     Comment:     Estimated glomerular filtration rate (eGFR) is normalized to an  average body surface area of 1.73 square meters.  The calculation  used to obtain the eGFR is the adjusted MDRD equation, which factors  patient sex, age, race, and creatinine result.  Since race is unknown  in our information system, the eGFR values for -American  and Non--American patients are given for each creatinine  result.     eGFR if non    Date Value Ref Range Status   01/06/2011 >60 >60 mL/min Final     Lab Results   Component Value Date    WBC 26.83 (H) 11/04/2024    HGB 11.2 (L) 11/04/2024    HCT 33.0 (L) 11/04/2024    MCV 93 11/04/2024     11/04/2024     Lab Results   Component Value Date    CHOL 153 10/10/2023    CHOL 160 01/06/2011     Lab Results   Component Value Date    HDL 56 10/10/2023    HDL 64 01/06/2011     Lab Results   Component Value Date    LDLCALC 79.0 10/10/2023    LDLCALC 77.6 01/06/2011     Lab Results   Component Value Date    TRIG 90 10/10/2023    TRIG 92 01/06/2011     Lab Results   Component Value Date    CHOLHDL 36.6 10/10/2023    CHOLHDL 40.0 01/06/2011     Lab Results   Component Value Date    TSH 2.391 10/10/2023       ASSESSMENT/PLAN     Daphne Sullivan is a 34 y.o. female who was seen today for  annual exam. Exam findings consistent with possible DeQuervian tendinopathy and possible carpal tunnel. Pt encouraged to try wrist braces and uses NSAIDs prn.  Discussed referring to Podiatry for foot pain and PT for back pain. Pt due for annual labs. Will defer lipid screening since patient is currently breast feeding. Pt may follow up PRN    Diagnoses and all orders for this visit:  Annual physical exam  -     CBC Auto Differential; Future  -     Comprehensive Metabolic Panel; Future  -     Hemoglobin A1C; Future  -     TSH; Future    Heel pain, bilateral  -     Ambulatory referral/consult to Podiatry; Future    Chronic bilateral low back pain without sciatica  -     Ambulatory Referral/Consult to Physical/Occupational Therapy; Future      Follow up with PCP in 1yr for annual    Zena Ernst PA-C  Department of Internal Medicine - [facility]   11:16 AM     Glory MOTTAS  [facility]          [1]   Social History  Socioeconomic History    Marital status: Significant Other   Occupational History    Occupation:    Tobacco Use    Smoking status: Never    Smokeless tobacco: Never   Substance and Sexual Activity    Alcohol use: Not Currently     Alcohol/week: 2.0 standard drinks of alcohol     Types: 2 Standard drinks or equivalent per week     Comment: socially    Drug use: No    Sexual activity: Yes     Partners: Male   [2]   Social History  Tobacco Use   Smoking Status Never   Smokeless Tobacco Never

## 2025-03-11 DIAGNOSIS — R52 PAIN: Primary | ICD-10-CM

## 2025-03-28 ENCOUNTER — TELEPHONE (OUTPATIENT)
Dept: ORTHOPEDICS | Facility: CLINIC | Age: 35
End: 2025-03-28
Payer: COMMERCIAL

## 2025-03-31 ENCOUNTER — HOSPITAL ENCOUNTER (OUTPATIENT)
Dept: RADIOLOGY | Facility: OTHER | Age: 35
Discharge: HOME | End: 2025-03-31
Attending: SPECIALIST/TECHNOLOGIST
Payer: COMMERCIAL

## 2025-03-31 ENCOUNTER — OFFICE VISIT (OUTPATIENT)
Dept: ORTHOPEDICS | Facility: CLINIC | Age: 35
End: 2025-03-31
Payer: COMMERCIAL

## 2025-03-31 DIAGNOSIS — R52 PAIN: ICD-10-CM

## 2025-03-31 DIAGNOSIS — M65.4 DE QUERVAIN'S TENOSYNOVITIS, LEFT: ICD-10-CM

## 2025-03-31 DIAGNOSIS — M65.4 DE QUERVAIN'S TENOSYNOVITIS, RIGHT: Primary | ICD-10-CM

## 2025-03-31 PROCEDURE — 99204 OFFICE O/P NEW MOD 45 MIN: CPT | Mod: S$GLB,,, | Performed by: SPECIALIST/TECHNOLOGIST

## 2025-03-31 PROCEDURE — 99999 PR PBB SHADOW E&M-EST. PATIENT-LVL II: CPT | Mod: PBBFAC,,, | Performed by: SPECIALIST/TECHNOLOGIST

## 2025-03-31 PROCEDURE — 73110 X-RAY EXAM OF WRIST: CPT | Mod: 26,LT,, | Performed by: RADIOLOGY

## 2025-03-31 PROCEDURE — 73110 X-RAY EXAM OF WRIST: CPT | Mod: TC,FY,LT

## 2025-03-31 NOTE — PROGRESS NOTES
Patient ID: Daphne Sullivan is a 34 y.o. female.    Chief Complaint: Pain of the Left Wrist    History of Present Illness    CHIEF COMPLAINT:  - Bilateral wrist pain, left worse than right.    HPI:  Daphne presents with bilateral wrist pain, initially starting in the right wrist but now affecting both, with the left being more painful. Pain occurs when bending wrists and touching the base of the pinky, with the most painful area in the muscle section near the wrist. The onset of symptoms coincides with the birth of her child four months ago. She attributes the pain to injury and inability to heal due to constantly holding her baby.    She has worn braces that have provided some relief. During the first two months postpartum, she experienced numbness and tingling in her wrists, heels, and feet, which she attributed to hormonal changes and increased phone use while in bed after giving birth.    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          Hand/Wrist Musculoskeletal Exam    Inspection    Right      Erythema: none      Ecchymosis: none      Edema: none      Deformity: none    Left      Erythema: none      Ecchymosis: none      Edema: none      Deformity: none    Palpation    Right      Wrist tenderness to palpation: first dorsal compartment and radial styloid    Left      Wrist tenderness to palpation: first dorsal compartment and radial styloid    Range of Motion    Right Hand      Right hand range of motion is normal.      Left Hand      Left hand range of motion is normal.       Right Wrist      Right wrist range of motion is normal.      Left Wrist      Left wrist range of motion is normal.      Neurovascular    Right       Radial pulse: normal      Capillary refill: brisk and <3 sec      Ulnar nerve  "sensory distribution: normal      Median nerve sensory distribution: normal      Superficial radial nerve sensory distribution: normal    Left       Radial pulse: normal      Capillary refill: brisk and <3 sec      Ulnar nerve sensory distribution: normal      Median nerve sensory distribution: normal      Superficial radial nerve sensory distribution: normal    Special Tests    Right      Finkelstein's test: positive    Left      Finkelstein's test: positive    Special tests additional comments: Positive WHAT Test on Bilateral Wrist. Worse in the left versus the right      Physical Exam    Musculoskeletal: Pain in both wrists during flexion. Pain in left wrist during thumb extension. Tenderness in left wrist.  IMAGING:  - XR Wrist: Normal joint alignment, scaphoid flexed forward, "teacup" pointing straight up, no fractures detected           IMAGING  Left wrist XR  Personal interpretation of the XR reveals no signs of fractures or dislocations      PLAN  Assessment & Plan    34-year-old female presents to clinic today with bilateral de Quervain tenosynovitis with left being worse than the right.  - Discussed steroid injection as a treatment option for De Quervain's tenosynovitis.  - Daphne declined injection at this visit.  - Provided wrist brace to patient for continued use.  - Use wrist brace as much as possible to limit flexed wrist position.  - Apply ice for compression   - Modify baby-holding technique to use forearm support or rotate wrist when bottle feeding   - Avoid accentuated flexed position of the wrist   - Take ibuprofen as needed   - Follow up if no improvement             Dick Groves PA-C, ATC  Hand and Upper Extremity   [facility]    This note was generated with the assistance of ambient listening technology. Verbal consent was obtained by the patient and accompanying visitor(s) for the recording of patient appointment to facilitate this note. I attest to having reviewed and edited the generated " note for accuracy, though some syntax or spelling errors may persist. Please contact the author of this note for any clarification.

## 2025-04-15 ENCOUNTER — PATIENT MESSAGE (OUTPATIENT)
Dept: OBSTETRICS AND GYNECOLOGY | Facility: CLINIC | Age: 35
End: 2025-04-15
Payer: COMMERCIAL

## 2025-04-15 ENCOUNTER — TELEPHONE (OUTPATIENT)
Dept: OBSTETRICS AND GYNECOLOGY | Facility: CLINIC | Age: 35
End: 2025-04-15
Payer: COMMERCIAL

## 2025-04-15 ENCOUNTER — PATIENT MESSAGE (OUTPATIENT)
Dept: SPORTS MEDICINE | Facility: CLINIC | Age: 35
End: 2025-04-15
Payer: COMMERCIAL

## 2025-04-15 RX ORDER — NORETHINDRONE 0.35 MG/1
1 TABLET ORAL DAILY
Qty: 30 TABLET | Refills: 2 | Status: SHIPPED | OUTPATIENT
Start: 2025-04-15 | End: 2026-04-15

## 2025-04-15 NOTE — TELEPHONE ENCOUNTER
----- Message from Med Assistant Devika sent at 4/15/2025  9:22 AM CDT -----  Pt states that she can  her birth control now can you please resend her prescription in.Please AdviseShareli

## 2025-05-05 ENCOUNTER — OFFICE VISIT (OUTPATIENT)
Dept: SPORTS MEDICINE | Facility: CLINIC | Age: 35
End: 2025-05-05
Payer: COMMERCIAL

## 2025-05-05 ENCOUNTER — HOSPITAL ENCOUNTER (OUTPATIENT)
Dept: RADIOLOGY | Facility: HOSPITAL | Age: 35
Discharge: HOME | End: 2025-05-05
Attending: ORTHOPAEDIC SURGERY
Payer: COMMERCIAL

## 2025-05-05 VITALS
SYSTOLIC BLOOD PRESSURE: 112 MMHG | HEART RATE: 58 BPM | WEIGHT: 120 LBS | DIASTOLIC BLOOD PRESSURE: 77 MMHG | BODY MASS INDEX: 22.08 KG/M2 | HEIGHT: 62 IN

## 2025-05-05 DIAGNOSIS — M65.4 TENOSYNOVITIS, DE QUERVAIN: ICD-10-CM

## 2025-05-05 DIAGNOSIS — M25.532 BILATERAL WRIST PAIN: Primary | ICD-10-CM

## 2025-05-05 DIAGNOSIS — M25.532 BILATERAL WRIST PAIN: ICD-10-CM

## 2025-05-05 DIAGNOSIS — M25.531 BILATERAL WRIST PAIN: Primary | ICD-10-CM

## 2025-05-05 DIAGNOSIS — M25.531 BILATERAL WRIST PAIN: ICD-10-CM

## 2025-05-05 PROCEDURE — 76942 ECHO GUIDE FOR BIOPSY: CPT | Mod: S$GLB,,, | Performed by: ORTHOPAEDIC SURGERY

## 2025-05-05 PROCEDURE — 20550 NJX 1 TENDON SHEATH/LIGAMENT: CPT | Mod: RT,S$GLB,, | Performed by: ORTHOPAEDIC SURGERY

## 2025-05-05 PROCEDURE — 99204 OFFICE O/P NEW MOD 45 MIN: CPT | Mod: 25,S$GLB,, | Performed by: ORTHOPAEDIC SURGERY

## 2025-05-05 PROCEDURE — 97110 THERAPEUTIC EXERCISES: CPT | Mod: S$GLB,,, | Performed by: ORTHOPAEDIC SURGERY

## 2025-05-05 PROCEDURE — 99999 PR PBB SHADOW E&M-EST. PATIENT-LVL III: CPT | Mod: PBBFAC,,, | Performed by: ORTHOPAEDIC SURGERY

## 2025-05-05 RX ORDER — TRIAMCINOLONE ACETONIDE 40 MG/ML
40 INJECTION, SUSPENSION INTRA-ARTICULAR; INTRAMUSCULAR
Status: COMPLETED | OUTPATIENT
Start: 2025-05-05 | End: 2025-05-05

## 2025-05-05 RX ADMIN — TRIAMCINOLONE ACETONIDE 40 MG: 40 INJECTION, SUSPENSION INTRA-ARTICULAR; INTRAMUSCULAR at 11:05

## 2025-05-05 NOTE — PROGRESS NOTES
"CC: bilateral Wrist pain    34 y.o. Female presents today for evaluation of her bilateral wrist pain. She admits to bilateral wrist pain for the past several months without mechanism of injury. She gestures along the radial aspect of the thumb and wrist. She is RHD.    How long: Several months   What makes it better: Rest  What makes it worse: Wrist flexion and extension, picking up her baby  Does it radiate: Yes - proximally along the forearm   Attempted treatments: Advil as needed and bracing   Pain score: 5/10   Any mechanical symptoms: Denies  Feelings of instability: Denies   Affecting ADLs: Yes    Occupation: Real Estate    PAST MEDICAL HISTORY:   Past Medical History:   Diagnosis Date    Anxiety disorder        PAST SURGICAL HISTORY:   Past Surgical History:   Procedure Laterality Date    NO PAST SURGERIES         FAMILY HISTORY:   Family History   Problem Relation Name Age of Onset    Heart disease Father  67        CAD--MI    Breast cancer Neg Hx      Cancer Neg Hx      Colon cancer Neg Hx      Diabetes Neg Hx      Eclampsia Neg Hx      Ovarian cancer Neg Hx       labor Neg Hx      Hypertension Neg Hx      Miscarriages / Stillbirths Neg Hx      Stroke Neg Hx         SOCIAL HISTORY:   Social History[1]    MEDICATIONS:   Current Medications[2]    ALLERGIES:   Review of patient's allergies indicates:  No Known Allergies     PHYSICAL EXAMINATION:  /77   Pulse (!) 58   Ht 5' 2" (1.575 m)   Wt 54.4 kg (119 lb 15.9 oz)   BMI 21.95 kg/m²   Vitals signs and nursing note have been reviewed.  General: In no acute distress, well developed, well nourished, no diaphoresis  Eyes: EOM full and smooth, no eye redness or discharge  HENT: normocephalic and atraumatic, neck supple, trachea midline, no nasal discharge, no external ear redness or discharge  Cardiovascular: 2+ and symmetric radial pulses bilaterally, no LE edema  Lungs: respirations non-labored, no conversational dyspnea   Abd: non-distended, no " rigidity  MSK: no amputation or deformity, no swelling of extremities  Neuro: AAOx3, CN2-12 grossly intact  Skin: No rashes, warm and dry  Psychiatric: cooperative, pleasant, mood and affect appropriate for age    MUSCULOSKELETAL  Wrist: bilateral   The affected Wrist is compared to the contralateral Wrist.    Observation:  No evidence of edema, erythema, ecchymosis, or effusion.  No asymmetries; muscle atrophy; ganglion cysts; or bony abnormalities including ulnar deviation.  No Heberdens or Brouchards nodes.  No swan-neck or boutonnière deformities.    No clubbing of the digits.    Tenderness:  No tenderness at radial styloid, Ruben's tubercle, ulnar styloid.  No tenderness at anatomic snuff box, scaphoid tubercle, scapholunate junction.  No tenderness at TFCC.  No tenderness at pisiform, hamate, metacarpals and phalanges.  No tenderness over median nerve at the carpal tunnel.  + tenderness over the APL and EPB tendons    Range of Motion:  Active wrist extension to 70° on left and 70° on right.    Active wrist flexion to 80° on left and 80° on right.    Active radial deviation to 20° on left and 20° on right.    Active ulnar deviation to 30° on left and 30° on right.    Active pronation to 80° on left and 80° on right.    Active supination to 80° on left and 80° on right.    Full active flexion and extension at the MCP, PIP, and DIP bilaterally.   Active thumb motion full in all planes.    Strength Testing: (*pain)  Wrist extension - 5/5 on left* and 5/5 on right*  Wrist flexion - 5/5 on left and 5/5 on right  Ulnar deviation - 5/5 on left and 5/5 on right  Radial deviation - 5/5 on left* and 5/5 on right*  Pronation - 5/5 on left and 5/5 on right  Supination - 5/5 on left and 5/5 on right    Finger flexion - 5/5 on left and 5/5 on right  Finger extension - 5/5 on left and 5/5 on right  Finger abduction - 5/5 on left and 5/5 on right  Finger adduction - 5/5 on left and 5/5 on right    Thumb flexion - 5/5 on  left and 5/5 on right  Thumb extension - 5/5 on left* and 5/5 on right*  Thumb abduction - 5/5 on left* and 5/5 on right*  Thumb adduction - 5/5 on left and 5/5 on right  Thumb opposition - 5/5 on left and 5/5 on right    Special Tests:  Phalen's test - negative  Tinel's test at wrist - negative  Carpal compression test - negative    Ulnar compression test - negative  Froment's sign - negative  Tinel's test of Guyon's canal - negative    No laxity with distal radioulnar joint translation.  Negative Smiths test.     No laxity with phalangeal varus/valgus stress testing.    Finkelsteins test - positive bilaterally    Axial grind of first CMC joint - negative  No laxity at the MCP UCL of the thumb    Normal fist alignment of the phalanges  No laxity at the RCL and UCL of the PIPs and DIPs of the phalanges.  Normal finger flexion of the FDP and FDS in all phalanges bilaterally.  Able to perform OK sign.    Neurovascular Exam:  Sensation intact to light touch in the median, ulnar, and radial distributions on the hands bilaterally.  Radial and ulnar pulses intact and symmetric.  Capillary refill intact to <2 seconds in all digits.      IMAGIN. X-ray ordered due to bilateral wrist pain   2. X-ray images were reviewed personally by me and then directly with patient.  3. FINDINGS: X-ray images obtained demonstrate no acute fracture or dislocation seen.  Well corticated ossific density adjacent to the ulnar styloid likely the sequela of prior trauma. No soft tissue edema or radiopaque retained foreign body.  4. IMPRESSION: As above.       Tendon Sheath Injection  1st Dorsal compartment of right wrist  Performed by: PARKER MOORE   Authorized by: PARKER MOORE   Consent Done?: Yes (Verbal)  Indications: Pain  Site marked: The procedure site was marked   Timeout: Prior to procedure the correct patient, procedure, and site was verified   Location: 1st Dorsal compartment of right wrist  Prep: Patient was prepped  with Chlorhexidine and alcohol in usual sterile fashion.  Skin anesthetic: Ethyl Chloride spray was used prior to skin puncture.  Ultrasonic Guidance for needle placement: Yes  Needle size: 25G, 1.5  Approach: radial, lateral  Medications: A 1 cc mixture of 1 cc of 40 mg/ml triamcinolone acetonide and 1 cc of 0.2% Naropin were injected into the tendon sheath.  Patient tolerance: Patient tolerated the procedure well with no immediate complications     Ultrasound guidance was used for needle localization with GE Logic , GE - 6-15 MHz (L) probe(s). Images were saved and stored for documentation. The structures of the 1st doral compartment of the wrist were visualized. Dynamic visualization of the needle(s) was continuous throughout the procedure and maintained good position and correct needle placement.       Triamcinolone:  NDC: [address]-5  LOT: [id number]  EXP: 2026/06       ASSESSMENT:      ICD-10-CM ICD-9-CM   1. Bilateral wrist pain  M25.531 719.43    M25.532    2. Tenosynovitis, de Quervain  M65.4 727.04       PLAN:  1-2. Bilateral wrist pain/de Quervain's tenosynovitis -     - Daphne admits to bilateral wrist pain along the radial aspect of the thumb and wrist for the past several months without mechanism of injury. She was referred by Olena Mckenzie for consideration of possible de Quervain's tenosynovitis injection. 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    - XRs ordered in the office today and images were personally reviewed with the patient. See above for further detail.    - Symptoms, exam, and imaging are most consistent with de Quervain's tenosynovitis.  We discussed the importance of decreasing inflammation and strengthening and stabilizing to help promote and maintain symptom improvement/resolution.  This is commonly accomplished with a short course of an anti-inflammatory and icing in addition to osteopathic manipulation, a home exercise program or  physical therapy.    - After discussing options she elects to proceed with ultrasound guided right de Quervain's tenosynovitis. See above for procedure detail.     - Advised to return to her wrist brace post injection when she returns home today. Continue for the next 2-3 days to help with inflammation.    - HEP for de Quervain's tenosynovitis prescribed today. Handouts printed, provided, explained, and exercises were demonstrated as needed. Encouraged to do daily. [id number] HOME EXERCISE PROGRAM (HEP):  The patient was taught a homegoing physical therapy regimen as described above by provider with assistance of sports medicine assistant. The patient demonstrated understanding of the exercises and proper technique of their execution. This process took 15 minutes.       Future planning includes - add occupational therapy     All questions were answered to the best of my ability and all concerns were addressed at this time.    Follow up as needed.      This note is dictated using the M*Modal Fluency Direct word recognition program. There are word recognition mistakes that are occasionally missed on review.           [1]   Social History  Socioeconomic History    Marital status: Significant Other   Occupational History    Occupation:    Tobacco Use    Smoking status: Never    Smokeless tobacco: Never   Substance and Sexual Activity    Alcohol use: Not Currently     Alcohol/week: 2.0 standard drinks of alcohol     Types: 2 Standard drinks or equivalent per week     Comment: socially    Drug use: No    Sexual activity: Yes     Partners: Male     Social Drivers of Health     Financial Resource Strain: Low Risk  (5/5/2025)    Overall Financial Resource Strain (CARDIA)     Difficulty of Paying Living Expenses: Not very hard   Food Insecurity: No Food Insecurity (5/5/2025)    Hunger Vital Sign     Worried About Running Out of Food in the Last Year: Never true     Ran Out of Food in the Last Year: Never true    Transportation Needs: Unknown (5/5/2025)    PRAPARE - Transportation     Lack of Transportation (Non-Medical): No   Physical Activity: Insufficiently Active (5/5/2025)    Exercise Vital Sign     Days of Exercise per Week: 3 days     Minutes of Exercise per Session: 40 min   Stress: No Stress Concern Present (5/5/2025)    [employer] - Occupational Stress Questionnaire     Feeling of Stress : Not at all   Housing Stability: Low Risk  (5/5/2025)    Housing Stability Vital Sign     Unable to Pay for Housing in the Last Year: No     Homeless in the Last Year: No   [2]   Current Outpatient Medications:     acetaminophen (TYLENOL) 325 MG tablet, Take 2 tablets (650 mg total) by mouth every 6 (six) hours as needed for Pain. (Patient not taking: Reported on 2/4/2025), Disp: 30 tablet, Rfl: 0    dicloxacillin (DYNAPEN) 250 MG capsule, Take 2 capsules (500 mg total) by mouth 4 (four) times daily. (Patient not taking: Reported on 5/5/2025), Disp: 80 capsule, Rfl: 0    hydrocortisone 2.5 % cream, Apply topically 2 (two) times daily. (Patient not taking: Reported on 5/5/2025), Disp: 28 g, Rfl: 0    ibuprofen (ADVIL,MOTRIN) 600 MG tablet, Take 1 tablet (600 mg total) by mouth every 8 (eight) hours as needed for Pain. (Patient not taking: Reported on 2/4/2025), Disp: 30 tablet, Rfl: 0    norethindrone (MICRONOR) 0.35 mg tablet, Take 1 tablet (0.35 mg total) by mouth once daily. (Patient not taking: Reported on 5/5/2025), Disp: 30 tablet, Rfl: 2    prenatal vit no.124/iron/folic (PRENATAL VITAMIN ORAL), Take 1 Dose by mouth Daily. (Patient not taking: Reported on 2/4/2025), Disp: , Rfl:   No current facility-administered medications for this visit.

## 2025-07-09 ENCOUNTER — OFFICE VISIT (OUTPATIENT)
Dept: INTERNAL MEDICINE | Facility: CLINIC | Age: 35
End: 2025-07-09
Payer: COMMERCIAL

## 2025-07-09 DIAGNOSIS — F41.1 GENERALIZED ANXIETY DISORDER: Primary | ICD-10-CM

## 2025-07-09 PROBLEM — O13.3 GESTATIONAL HYPERTENSION, THIRD TRIMESTER: Status: RESOLVED | Noted: 2024-11-03 | Resolved: 2025-07-09

## 2025-07-09 PROCEDURE — 98005 SYNCH AUDIO-VIDEO EST LOW 20: CPT | Mod: 95,,, | Performed by: INTERNAL MEDICINE

## 2025-07-09 RX ORDER — ALPRAZOLAM 0.5 MG/1
0.5 TABLET ORAL 3 TIMES DAILY PRN
Qty: 20 TABLET | Refills: 0 | Status: SHIPPED | OUTPATIENT
Start: 2025-07-09 | End: 2025-08-08

## 2025-07-09 NOTE — PROGRESS NOTES
The patient location is:  Patient Home   The chief complaint leading to consultation is:  Anxiety    Subjective:         Anxiety  Patient reports no chest pain, confusion, dysphagia or palpitations.        Daphne Sullivan is a 34 y.o.  female who presents for Anxiety  .   History of Present Illness    Ms. Sullivan presents today for anxiety after stopping breastfeeding.    She delivered a male infant in November with no initial postpartum complications. She stopped breastfeeding approximately one month ago, which coincided with onset of increased anxiety symptoms. Baby is currently sleeping through the night.    She reports anxiety symptoms occurring approximately once weekly, with frequency increasing to every three days during periods of high stress. She recently experienced increased worry specifically related to air travel and describes experiencing chest tightness during anxiety episodes. She denies experiencing full panic attacks. Anxiety symptoms appear to fluctuate based on lifestyle and stress management. She describes experiencing heightened anxiety, particularly around travel, with increased worry and obsessive thoughts. She reports self-monitoring symptoms and checking blood pressure during anxious periods, which remains normal. She is currently monitoring anxiety symptoms and considering potential intervention if symptoms persist, with a self-proposed plan to reassess in one month.    She reports ongoing lifestyle management strategies including regular yoga practice, cold plunge therapy, and acupuncture. She describes experiencing variable days with fluctuating wellness, but remains proactive in pursuing holistic health approaches.    She recently initiated birth control pill. She has a history of Alprazolam use, taking approximately one bottle per year as needed for anxiety, primarily for fear of flying and stress management.      ROS:  Cardiovascular: -palpitations, +chest  tightness  Gastrointestinal: -diarrhea  Psychiatric: +anxiety, +excessive worry       Review of Systems   Constitutional:  Negative for activity change and unexpected weight change.   HENT:  Negative for hearing loss, rhinorrhea and trouble swallowing.    Eyes:  Negative for discharge and visual disturbance.   Respiratory:  Negative for chest tightness and wheezing.    Cardiovascular:  Negative for chest pain and palpitations.   Gastrointestinal:  Negative for blood in stool, constipation, diarrhea and vomiting.   Endocrine: Negative for polydipsia and polyuria.   Genitourinary:  Negative for difficulty urinating, dysuria, hematuria and menstrual problem.   Musculoskeletal:  Negative for arthralgias, joint swelling and neck pain.   Neurological:  Negative for weakness and headaches.   Psychiatric/Behavioral:  Negative for confusion and dysphoric mood.        Problem List[1]    Past Medical History:   Diagnosis Date    Anxiety disorder        Past Surgical History:   Procedure Laterality Date    NO PAST SURGERIES         Family History   Problem Relation Name Age of Onset    Heart disease Father  67        CAD--MI    Breast cancer Neg Hx      Cancer Neg Hx      Colon cancer Neg Hx      Diabetes Neg Hx      Eclampsia Neg Hx      Ovarian cancer Neg Hx       labor Neg Hx      Hypertension Neg Hx      Miscarriages / Stillbirths Neg Hx      Stroke Neg Hx         Social History[2]    Objective:   currently breastfeeding.     Physical Exam  Constitutional:       General: She is not in acute distress.     Appearance: She is well-developed. She is not diaphoretic.   HENT:      Head: Normocephalic and atraumatic.   Eyes:      General: No scleral icterus.        Right eye: No discharge.         Left eye: No discharge.   Pulmonary:      Effort: Pulmonary effort is normal. No respiratory distress.   Skin:     Coloration: Skin is not pale.      Findings: No erythema.   Neurological:      Mental Status: She is alert and  oriented to person, place, and time.   Psychiatric:         Behavior: Behavior normal.         Thought Content: Thought content normal.             Prior labs reviewed  Assessment/Plan:       1. Generalized anxiety disorder  Comments:  call if not improving and we will start zoloft 50 mg daily and follow up one month later to taper or adjust as needed  Orders:  -     ALPRAZolam (XANAX) 0.5 MG tablet; Take 1 tablet (0.5 mg total) by mouth 3 (three) times daily as needed for Anxiety.  Dispense: 20 tablet; Refill: 0      Assessment & Plan    - Assessed postpartum anxiety symptoms, noting occurrence every few days to once a week depending on stress levels.  - Considered hormonal factors due to recent cessation of breastfeeding and initiation of birth control.  - Ruled out postpartum thyroiditis based on recent normal thyroid test and absence of typical symptoms.    PLAN SUMMARY:  - Advised deep breathing and box breathing techniques for anxiety management  - Discussed potential use of sertraline (Zoloft) if symptoms persist or worsen  - Prescribed alprazolam (Xanax) for short-term anxiety management  - Recommend continuing yoga, cold plunges, acupuncture, and maintaining regular sleep schedule  - Ms. Sullivan to contact office if anxiety continues to discuss initiating sertraline 50mg  - will Follow-up in 1 month if sertraline is prescribed to assess dosage and effectiveness    ANXIETY DISORDER:  - Monitored the patient's anxiety symptoms, which occur once a week, increasing to every 3 days during stressful weeks with no full-on panic attacks reported.  - Symptoms appear to be related to hormonal shifts after stopping breastfeeding and starting oral contraceptives.  - Blood pressure is normal despite anxiety, and thyroid function was within normal limits when checked 4 months ago.  - Explained that anxiety is common in new parents and can persist through different life stages, providing information on postpartum thyroiditis  symptoms for future reference.  - Recommend continuing current healthy practices such as yoga, cold plunges, acupuncture, and maintaining a regular sleep schedule.  - For management, advised practicing deep breathing and box breathing techniques.  - Prescribed alprazolam (Xanax) for short-term anxiety management.  - Discussed potential for sertraline (Zoloft) if symptoms persist or worsen.  - Instructed patient to contact the office if anxiety continues to discuss initiating sertraline and to message to request a prescription if deciding to start.    FOLLOW-UP:  - Scheduled follow up in 1 month after starting sertraline, if prescribed, to assess dosage and effectiveness.             Visit type: Virtual visit with synchronous audio and video    20 min spent in care of patient including history, physical, chart review, orders and coordination of care.       Each patient to whom he or she provides medical services by telemedicine is:  (1) informed of the relationship between the physician and patient and the respective role of any other health care provider with respect to management of the patient; and (2) notified that he or she may decline to receive medical services by telemedicine and may withdraw from such care at any time.  Medication List with Changes/Refills   Current Medications    NORETHINDRONE (MICRONOR) 0.35 MG TABLET    Take 1 tablet (0.35 mg total) by mouth once daily.   Changed and/or Refilled Medications    Modified Medication Previous Medication    ALPRAZOLAM (XANAX) 0.5 MG TABLET ALPRAZolam (XANAX) 0.5 MG tablet       Take 1 tablet (0.5 mg total) by mouth 3 (three) times daily as needed for Anxiety.    Take 1 tablet (0.5 mg total) by mouth 3 (three) times daily as needed for Anxiety.   Discontinued Medications    DICLOXACILLIN (DYNAPEN) 250 MG CAPSULE    Take 2 capsules (500 mg total) by mouth 4 (four) times daily.    HYDROCORTISONE 2.5 % CREAM    Apply topically 2 (two) times daily.       This note  was generated with the assistance of ambient listening technology. Verbal consent was obtained by the patient and accompanying visitor(s) for the recording of patient appointment to facilitate this note. I attest to having reviewed and edited the generated note for accuracy, though some syntax or spelling errors may persist. Please contact the author of this note for any clarification.         [1]   Patient Active Problem List  Diagnosis    Anal fissure    Fissure in ano    Social anxiety disorder    Chronic right-sided low back pain without sciatica    Chronic tension-type headache, not intractable    Episodic tension-type headache, not intractable    Chronic neck and back pain     (spontaneous vaginal delivery)    Obstetrical laceration    Gestational hypertension, third trimester    History of anxiety    History of gestational hypertension    Encounter for contraceptive management   [2]   Social History  Tobacco Use    Smoking status: Never    Smokeless tobacco: Never   Substance Use Topics    Alcohol use: Not Currently     Alcohol/week: 2.0 standard drinks of alcohol     Types: 2 Standard drinks or equivalent per week     Comment: socially    Drug use: No

## 2025-08-18 ENCOUNTER — PATIENT MESSAGE (OUTPATIENT)
Dept: OBSTETRICS AND GYNECOLOGY | Facility: CLINIC | Age: 35
End: 2025-08-18
Payer: COMMERCIAL

## 2025-08-18 RX ORDER — NORETHINDRONE 0.35 MG/1
1 TABLET ORAL DAILY
Qty: 30 TABLET | Refills: 6 | Status: SHIPPED | OUTPATIENT
Start: 2025-08-18 | End: 2025-08-18

## 2025-08-18 RX ORDER — DROSPIRENONE AND ETHINYL ESTRADIOL 0.02-3(28)
1 KIT ORAL DAILY
Qty: 84 TABLET | Refills: 4 | Status: SHIPPED | OUTPATIENT
Start: 2025-08-18